# Patient Record
Sex: FEMALE | URBAN - METROPOLITAN AREA
[De-identification: names, ages, dates, MRNs, and addresses within clinical notes are randomized per-mention and may not be internally consistent; named-entity substitution may affect disease eponyms.]

---

## 2022-07-13 ENCOUNTER — HOSPITAL ENCOUNTER (OUTPATIENT)
Age: 47
Discharge: HOME | End: 2022-07-15

## 2022-07-13 PROCEDURE — 88304 TISSUE EXAM BY PATHOLOGIST: CPT

## 2022-07-13 PROCEDURE — 88305 TISSUE EXAM BY PATHOLOGIST: CPT

## 2023-05-19 LAB
APPEARANCE, URINE: CLEAR
BILIRUBIN, URINE: NEGATIVE
BLOOD, URINE: NEGATIVE
C REACTIVE PROTEIN (MG/L) IN SER/PLAS: <0.1 MG/DL
CALCIUM OXALATE CRYSTALS, URINE: ABNORMAL /HPF
COLOR, URINE: YELLOW
COMPLEMENT C3 (MG/DL) IN SER/PLAS: 110 MG/DL (ref 87–200)
COMPLEMENT C4 (MG/DL) IN SER/PLAS: 29 MG/DL (ref 10–50)
CREATININE (MG/DL) IN URINE: 116 MG/DL (ref 20–320)
GLUCOSE, URINE: NEGATIVE MG/DL
KETONES, URINE: NEGATIVE MG/DL
LEUKOCYTE ESTERASE, URINE: ABNORMAL
NITRITE, URINE: NEGATIVE
PH, URINE: 6 (ref 5–8)
PROTEIN (MG/DL) IN URINE: 10 MG/DL (ref 5–24)
PROTEIN, URINE: NEGATIVE MG/DL
PROTEIN/CREATININE (MG/MG) IN URINE: 0.09 MG/MG CREAT (ref 0–0.17)
RBC, URINE: 3 /HPF (ref 0–5)
SPECIFIC GRAVITY, URINE: 1.03 (ref 1–1.03)
SQUAMOUS EPITHELIAL CELLS, URINE: 1 /HPF
UROBILINOGEN, URINE: <2 MG/DL (ref 0–1.9)
WBC, URINE: 7 /HPF (ref 0–5)

## 2023-06-01 LAB
C REACTIVE PROTEIN (MG/L) IN SER/PLAS: <0.1 MG/DL
ERYTHROCYTE DISTRIBUTION WIDTH (RATIO) BY AUTOMATED COUNT: 11.6 % (ref 11.5–14.5)
ERYTHROCYTE MEAN CORPUSCULAR HEMOGLOBIN CONCENTRATION (G/DL) BY AUTOMATED: 33.5 G/DL (ref 32–36)
ERYTHROCYTE MEAN CORPUSCULAR VOLUME (FL) BY AUTOMATED COUNT: 99 FL (ref 80–100)
ERYTHROCYTES (10*6/UL) IN BLOOD BY AUTOMATED COUNT: 3.96 X10E12/L (ref 4–5.2)
HEMATOCRIT (%) IN BLOOD BY AUTOMATED COUNT: 39.4 % (ref 36–46)
HEMOGLOBIN (G/DL) IN BLOOD: 13.2 G/DL (ref 12–16)
LEUKOCYTES (10*3/UL) IN BLOOD BY AUTOMATED COUNT: 7.2 X10E9/L (ref 4.4–11.3)
PLATELETS (10*3/UL) IN BLOOD AUTOMATED COUNT: 311 X10E9/L (ref 150–450)
SEDIMENTATION RATE, ERYTHROCYTE: 1 MM/H (ref 0–20)

## 2023-08-07 LAB
ALANINE AMINOTRANSFERASE (SGPT) (U/L) IN SER/PLAS: 20 U/L (ref 7–45)
ALBUMIN (G/DL) IN SER/PLAS: 4.2 G/DL (ref 3.4–5)
ALKALINE PHOSPHATASE (U/L) IN SER/PLAS: 42 U/L (ref 33–110)
ANION GAP IN SER/PLAS: 11 MMOL/L (ref 10–20)
ASPARTATE AMINOTRANSFERASE (SGOT) (U/L) IN SER/PLAS: 21 U/L (ref 9–39)
BILIRUBIN TOTAL (MG/DL) IN SER/PLAS: 0.4 MG/DL (ref 0–1.2)
C REACTIVE PROTEIN (MG/L) IN SER/PLAS: <0.1 MG/DL
CALCIUM (MG/DL) IN SER/PLAS: 9.3 MG/DL (ref 8.6–10.3)
CARBON DIOXIDE, TOTAL (MMOL/L) IN SER/PLAS: 26 MMOL/L (ref 21–32)
CHLORIDE (MMOL/L) IN SER/PLAS: 103 MMOL/L (ref 98–107)
CREATININE (MG/DL) IN SER/PLAS: 1.06 MG/DL (ref 0.5–1.05)
ERYTHROCYTE DISTRIBUTION WIDTH (RATIO) BY AUTOMATED COUNT: 11.5 % (ref 11.5–14.5)
ERYTHROCYTE MEAN CORPUSCULAR HEMOGLOBIN CONCENTRATION (G/DL) BY AUTOMATED: 33.2 G/DL (ref 32–36)
ERYTHROCYTE MEAN CORPUSCULAR VOLUME (FL) BY AUTOMATED COUNT: 97 FL (ref 80–100)
ERYTHROCYTES (10*6/UL) IN BLOOD BY AUTOMATED COUNT: 4.33 X10E12/L (ref 4–5.2)
GFR FEMALE: 65 ML/MIN/1.73M2
GLUCOSE (MG/DL) IN SER/PLAS: 76 MG/DL (ref 74–99)
HEMATOCRIT (%) IN BLOOD BY AUTOMATED COUNT: 42.2 % (ref 36–46)
HEMOGLOBIN (G/DL) IN BLOOD: 14 G/DL (ref 12–16)
LEUKOCYTES (10*3/UL) IN BLOOD BY AUTOMATED COUNT: 8.5 X10E9/L (ref 4.4–11.3)
PLATELETS (10*3/UL) IN BLOOD AUTOMATED COUNT: 436 X10E9/L (ref 150–450)
POTASSIUM (MMOL/L) IN SER/PLAS: 3.8 MMOL/L (ref 3.5–5.3)
PROTEIN TOTAL: 6.9 G/DL (ref 6.4–8.2)
SEDIMENTATION RATE, ERYTHROCYTE: 9 MM/H (ref 0–20)
SODIUM (MMOL/L) IN SER/PLAS: 136 MMOL/L (ref 136–145)
THYROTROPIN (MIU/L) IN SER/PLAS BY DETECTION LIMIT <= 0.05 MIU/L: 2.78 MIU/L (ref 0.44–3.98)
UREA NITROGEN (MG/DL) IN SER/PLAS: 19 MG/DL (ref 6–23)

## 2023-08-08 LAB
CALCIDIOL (25 OH VITAMIN D3) (NG/ML) IN SER/PLAS: 56 NG/ML
CITRULLINE ANTIBODY, IGG: <1 U/ML
RHEUMATOID FACTOR (IU/ML) IN SERUM OR PLASMA: <10 IU/ML (ref 0–15)

## 2023-10-24 ENCOUNTER — TELEPHONE (OUTPATIENT)
Dept: RHEUMATOLOGY | Facility: CLINIC | Age: 48
End: 2023-10-24

## 2023-10-24 NOTE — TELEPHONE ENCOUNTER
Patient wants to discuss bloodwork results. She indicated that she is concerned about her iron levels. Please call her.

## 2023-11-10 PROBLEM — M25.50 ARTHRALGIA: Status: ACTIVE | Noted: 2023-11-10

## 2023-11-10 PROBLEM — N92.6 IRREGULAR BLEEDING: Status: ACTIVE | Noted: 2023-11-10

## 2023-11-10 PROBLEM — K29.60 EROSIVE GASTRITIS: Status: ACTIVE | Noted: 2023-11-10

## 2023-11-10 PROBLEM — R19.09 OTHER INTRA-ABDOMINAL AND PELVIC SWELLING, MASS AND LUMP: Status: ACTIVE | Noted: 2023-11-10

## 2023-11-10 PROBLEM — Z04.9 CONDITION NOT FOUND: Status: ACTIVE | Noted: 2023-11-10

## 2023-11-10 PROBLEM — N83.209 BENIGN OVARIAN CYST: Status: ACTIVE | Noted: 2023-11-10

## 2023-11-10 PROBLEM — R82.998 URINE LEUKOCYTES: Status: ACTIVE | Noted: 2023-11-10

## 2023-11-10 PROBLEM — K31.84 GASTROPARESIS: Status: ACTIVE | Noted: 2023-11-10

## 2023-11-10 PROBLEM — N95.1 PERIMENOPAUSAL SYMPTOMS: Status: ACTIVE | Noted: 2023-11-10

## 2023-11-10 PROBLEM — R76.8 POSITIVE ANA (ANTINUCLEAR ANTIBODY): Status: ACTIVE | Noted: 2023-11-10

## 2023-11-10 PROBLEM — G58.8 INTERCOSTAL NEURALGIA: Status: ACTIVE | Noted: 2023-11-10

## 2023-11-10 PROBLEM — N70.11 CHRONIC SALPINGITIS: Status: ACTIVE | Noted: 2023-11-10

## 2023-11-10 PROBLEM — R30.0 DYSURIA: Status: ACTIVE | Noted: 2023-11-10

## 2023-11-10 PROBLEM — R32 URINARY INCONTINENCE: Status: ACTIVE | Noted: 2023-11-10

## 2023-11-10 PROBLEM — K82.8 BILIARY DYSKINESIA: Status: ACTIVE | Noted: 2023-11-10

## 2023-11-10 PROBLEM — N95.1 VAGINAL DRYNESS, MENOPAUSAL: Status: ACTIVE | Noted: 2023-11-10

## 2023-11-10 PROBLEM — E04.1 SOLITARY THYROID NODULE: Status: ACTIVE | Noted: 2023-11-10

## 2023-11-10 PROBLEM — I73.00 RAYNAUD PHENOMENON: Status: ACTIVE | Noted: 2023-11-10

## 2023-11-10 PROBLEM — Z86.69 HISTORY OF MIGRAINE HEADACHES: Status: ACTIVE | Noted: 2023-11-10

## 2023-11-10 PROBLEM — M54.50 LUMBAGO: Status: ACTIVE | Noted: 2023-11-10

## 2023-11-10 PROBLEM — T69.1XXA CHILBLAIN: Status: ACTIVE | Noted: 2023-11-10

## 2023-11-10 PROBLEM — E03.9 HYPOTHYROIDISM: Status: ACTIVE | Noted: 2023-11-10

## 2023-11-10 PROBLEM — N32.81 OAB (OVERACTIVE BLADDER): Status: ACTIVE | Noted: 2023-11-10

## 2023-11-10 PROBLEM — G57.00 PIRIFORMIS SYNDROME: Status: ACTIVE | Noted: 2023-11-10

## 2023-11-10 RX ORDER — RIZATRIPTAN BENZOATE 10 MG/1
TABLET ORAL
COMMUNITY
Start: 2023-10-03

## 2023-11-10 RX ORDER — ZIPRASIDONE HYDROCHLORIDE 20 MG/1
20 CAPSULE ORAL
COMMUNITY
Start: 2023-10-07 | End: 2023-11-11 | Stop reason: WASHOUT

## 2023-11-10 RX ORDER — TOLTERODINE 4 MG/1
4 CAPSULE, EXTENDED RELEASE ORAL DAILY
COMMUNITY
Start: 2023-07-20

## 2023-11-10 RX ORDER — ONDANSETRON 4 MG/1
4 TABLET, FILM COATED ORAL EVERY 8 HOURS PRN
COMMUNITY
Start: 2023-10-03

## 2023-11-10 RX ORDER — ZIPRASIDONE HYDROCHLORIDE 40 MG/1
40 CAPSULE ORAL
COMMUNITY
End: 2023-11-11 | Stop reason: WASHOUT

## 2023-11-10 RX ORDER — POLYETHYLENE GLYCOL 3350 17 G/17G
POWDER, FOR SOLUTION ORAL
COMMUNITY

## 2023-11-10 RX ORDER — LIOTHYRONINE SODIUM 5 UG/1
5 TABLET ORAL 2 TIMES DAILY
COMMUNITY
End: 2023-11-11 | Stop reason: WASHOUT

## 2023-11-10 RX ORDER — SPIRONOLACTONE 25 MG/1
25 TABLET ORAL DAILY
COMMUNITY
Start: 2023-09-30

## 2023-11-10 RX ORDER — BUPROPION HYDROCHLORIDE 300 MG/1
300 TABLET ORAL EVERY MORNING
COMMUNITY
Start: 2023-10-07

## 2023-11-10 RX ORDER — FREMANEZUMAB-VFRM 225 MG/1.5ML
INJECTION SUBCUTANEOUS
COMMUNITY
Start: 2023-01-30 | End: 2023-11-11 | Stop reason: WASHOUT

## 2023-11-10 RX ORDER — ERENUMAB-AOOE 140 MG/ML
INJECTION, SOLUTION SUBCUTANEOUS
COMMUNITY
Start: 2023-10-02 | End: 2023-11-11 | Stop reason: WASHOUT

## 2023-11-10 RX ORDER — SCOLOPAMINE TRANSDERMAL SYSTEM 1 MG/1
1 PATCH, EXTENDED RELEASE TRANSDERMAL
COMMUNITY
Start: 2023-06-11

## 2023-11-10 RX ORDER — ASPIRIN 325 MG
50000 TABLET, DELAYED RELEASE (ENTERIC COATED) ORAL
COMMUNITY
Start: 2023-09-19

## 2023-11-10 RX ORDER — HYDROCODONE BITARTRATE AND ACETAMINOPHEN 5; 325 MG/1; MG/1
TABLET ORAL
COMMUNITY
Start: 2023-09-18 | End: 2023-11-11 | Stop reason: WASHOUT

## 2023-11-10 RX ORDER — VILAZODONE HYDROCHLORIDE 40 MG/1
40 TABLET ORAL EVERY MORNING
COMMUNITY
Start: 2023-10-03 | End: 2023-11-11 | Stop reason: WASHOUT

## 2023-11-10 RX ORDER — VILAZODONE HYDROCHLORIDE 20 MG/1
20 TABLET ORAL EVERY MORNING
COMMUNITY
Start: 2023-08-07 | End: 2023-11-11 | Stop reason: WASHOUT

## 2023-11-10 RX ORDER — PLECANATIDE 3 MG/1
TABLET ORAL
COMMUNITY
Start: 2023-08-14

## 2023-11-10 RX ORDER — VORTIOXETINE 10 MG/1
10 TABLET, FILM COATED ORAL EVERY MORNING
COMMUNITY
Start: 2023-05-25 | End: 2023-11-11 | Stop reason: WASHOUT

## 2023-11-10 RX ORDER — DICLOFENAC EPOLAMINE 0.01 G/1
1 SYSTEM TOPICAL 2 TIMES DAILY PRN
COMMUNITY
Start: 2019-10-31 | End: 2023-11-11 | Stop reason: WASHOUT

## 2023-11-10 RX ORDER — DESOXIMETASONE 2.5 MG/G
OINTMENT TOPICAL
COMMUNITY

## 2023-11-10 RX ORDER — MIRABEGRON 50 MG/1
1 TABLET, FILM COATED, EXTENDED RELEASE ORAL DAILY
COMMUNITY
Start: 2021-06-12 | End: 2023-11-11 | Stop reason: WASHOUT

## 2023-11-10 RX ORDER — LEVOTHYROXINE SODIUM 88 UG/1
TABLET ORAL
COMMUNITY

## 2023-11-10 RX ORDER — TOPIRAMATE 100 MG/1
TABLET, FILM COATED ORAL
COMMUNITY
Start: 2018-02-22

## 2023-11-10 RX ORDER — DULOXETIN HYDROCHLORIDE 20 MG/1
CAPSULE, DELAYED RELEASE ORAL
COMMUNITY
End: 2023-11-11 | Stop reason: WASHOUT

## 2023-11-11 ENCOUNTER — OFFICE VISIT (OUTPATIENT)
Dept: OBSTETRICS AND GYNECOLOGY | Facility: CLINIC | Age: 48
End: 2023-11-11
Payer: COMMERCIAL

## 2023-11-11 VITALS
HEIGHT: 64 IN | WEIGHT: 124 LBS | BODY MASS INDEX: 21.17 KG/M2 | TEMPERATURE: 97 F | DIASTOLIC BLOOD PRESSURE: 63 MMHG | SYSTOLIC BLOOD PRESSURE: 112 MMHG

## 2023-11-11 DIAGNOSIS — N39.3 STRESS INCONTINENCE OF URINE: Primary | ICD-10-CM

## 2023-11-11 DIAGNOSIS — N95.2 POSTMENOPAUSAL ATROPHIC VAGINITIS: ICD-10-CM

## 2023-11-11 PROCEDURE — 99214 OFFICE O/P EST MOD 30 MIN: CPT | Performed by: MIDWIFE

## 2023-11-11 PROCEDURE — 1036F TOBACCO NON-USER: CPT | Performed by: MIDWIFE

## 2023-11-11 PROCEDURE — 81003 URINALYSIS AUTO W/O SCOPE: CPT | Performed by: MIDWIFE

## 2023-11-11 RX ORDER — ESTRADIOL 0.1 MG/G
CREAM VAGINAL
Qty: 34 G | Refills: 3 | Status: SHIPPED | OUTPATIENT
Start: 2023-11-11

## 2023-11-11 ASSESSMENT — PAIN SCALES - GENERAL: PAINLEVEL: 0-NO PAIN

## 2023-11-11 NOTE — PROGRESS NOTES
Subjective   Patient ID: Sienna Acuña is a 48 y.o.  female who presents for vulvovaginal irritation/bleeding.  Pt says that it is not helped with use of steroid creams.  She does exercise and run regularly so this area is often sweaty and exposed to urine d/t pt leaking and pt does shave hair on vulva    HPI  PMHx: Hyst for endometriosis; bilateral breast augmentation;partial sigmoidectomy with subsequent chronic constipation; bladder sling, OAB, chronic vulvar irritation managed with steroid cream  ROS: no pelvic pain, no dysuria, NAD  SocHx: , SA, nonsmoker  Review of Systems   Genitourinary:  Positive for vaginal pain.        Vulvar irritation with blood tinged discharge  Urinary leakage   All other systems reviewed and are negative.      Objective   Physical Exam  Genitourinary:     Uterus: Absent.       Comments: Vulvovaginal atrophic changes and skin friability in areas where pt shaves      Physical Exam  Vitals and nursing note reviewed.   Constitutional:       Appearance: Normal appearance.   HENT:     Head/Face: Normal  Eyes:      Pupils: Pupils are equal, round, and reactive to light.   Pulmonary:      Effort: Pulmonary effort is normal.     Abdominal:      Palpations: Abdomen is soft. There is no mass.      Tenderness: There is no abdominal tenderness.   Musculoskeletal:         General: Normal range of motion.   Lymphadenopathy:      Cervical: No cervical adenopathy.         General: Skin is warm and dry.   Neurological:      Mental Status: She is alert and oriented    Psychiatric:         Mood and Affect: Mood normal.     Assessment/Plan   Diagnoses and all orders for this visit:  Stress incontinence of urine  -     POCT UA Automated manually resulted  Postmenopausal atrophic vaginitis  -     estradiol (Estrace) 0.01 % (0.1 mg/gram) vaginal cream; Apply pea-sized amount intravaginally and to affected areas of vulva at bedtime for 2 weeks, then taper to as needed use.    Reassurance given re  exam findings  Vulvovaginal skin care discussed at length  Urine dip today in office is NEGATIVE-- NORMAL.   Pt encouraged to limit intake of caffeine to see if this helps with urinary leakage  If sx not improved within 90 days, then RTO is advised  RTO AE/prn

## 2024-02-01 ENCOUNTER — TELEPHONE (OUTPATIENT)
Dept: RHEUMATOLOGY | Facility: CLINIC | Age: 49
End: 2024-02-01
Payer: COMMERCIAL

## 2024-02-01 DIAGNOSIS — R76.8 POSITIVE ANA (ANTINUCLEAR ANTIBODY): Primary | ICD-10-CM

## 2024-02-01 NOTE — TELEPHONE ENCOUNTER
Patient wants to know if she needs to get blood work before her next appointment. Please advise. Thank you.

## 2024-02-09 ENCOUNTER — LAB (OUTPATIENT)
Dept: LAB | Facility: LAB | Age: 49
End: 2024-02-09
Payer: COMMERCIAL

## 2024-02-09 ENCOUNTER — OFFICE VISIT (OUTPATIENT)
Dept: RHEUMATOLOGY | Facility: CLINIC | Age: 49
End: 2024-02-09
Payer: COMMERCIAL

## 2024-02-09 VITALS
TEMPERATURE: 97.5 F | SYSTOLIC BLOOD PRESSURE: 103 MMHG | DIASTOLIC BLOOD PRESSURE: 42 MMHG | WEIGHT: 121 LBS | HEART RATE: 73 BPM | BODY MASS INDEX: 20.66 KG/M2 | HEIGHT: 64 IN

## 2024-02-09 DIAGNOSIS — I73.00 RAYNAUD'S DISEASE WITHOUT GANGRENE: ICD-10-CM

## 2024-02-09 DIAGNOSIS — R76.8 POSITIVE ANA (ANTINUCLEAR ANTIBODY): ICD-10-CM

## 2024-02-09 DIAGNOSIS — R76.8 POSITIVE ANA (ANTINUCLEAR ANTIBODY): Primary | ICD-10-CM

## 2024-02-09 DIAGNOSIS — M25.50 ARTHRALGIA, UNSPECIFIED JOINT: ICD-10-CM

## 2024-02-09 LAB
APPEARANCE UR: CLEAR
BASOPHILS # BLD AUTO: 0.04 X10*3/UL (ref 0–0.1)
BASOPHILS NFR BLD AUTO: 0.4 %
BILIRUB UR STRIP.AUTO-MCNC: NEGATIVE MG/DL
C3 SERPL-MCNC: 110 MG/DL (ref 87–200)
C4 SERPL-MCNC: 26 MG/DL (ref 10–50)
COLOR UR: ABNORMAL
CREAT UR-MCNC: 60.3 MG/DL (ref 20–320)
CRP SERPL-MCNC: <0.1 MG/DL
DSDNA AB SER-ACNC: 1 IU/ML
EOSINOPHIL # BLD AUTO: 0.08 X10*3/UL (ref 0–0.7)
EOSINOPHIL NFR BLD AUTO: 0.8 %
ERYTHROCYTE [DISTWIDTH] IN BLOOD BY AUTOMATED COUNT: 12.2 % (ref 11.5–14.5)
ERYTHROCYTE [SEDIMENTATION RATE] IN BLOOD BY WESTERGREN METHOD: <1 MM/H (ref 0–20)
GLUCOSE UR STRIP.AUTO-MCNC: NORMAL MG/DL
HCT VFR BLD AUTO: 42.1 % (ref 36–46)
HGB BLD-MCNC: 14.2 G/DL (ref 12–16)
IMM GRANULOCYTES # BLD AUTO: 0.04 X10*3/UL (ref 0–0.7)
IMM GRANULOCYTES NFR BLD AUTO: 0.4 % (ref 0–0.9)
KETONES UR STRIP.AUTO-MCNC: NEGATIVE MG/DL
LEUKOCYTE ESTERASE UR QL STRIP.AUTO: ABNORMAL
LYMPHOCYTES # BLD AUTO: 1.31 X10*3/UL (ref 1.2–4.8)
LYMPHOCYTES NFR BLD AUTO: 13.9 %
MCH RBC QN AUTO: 32.4 PG (ref 26–34)
MCHC RBC AUTO-ENTMCNC: 33.7 G/DL (ref 32–36)
MCV RBC AUTO: 96 FL (ref 80–100)
MONOCYTES # BLD AUTO: 0.58 X10*3/UL (ref 0.1–1)
MONOCYTES NFR BLD AUTO: 6.2 %
NEUTROPHILS # BLD AUTO: 7.37 X10*3/UL (ref 1.2–7.7)
NEUTROPHILS NFR BLD AUTO: 78.3 %
NITRITE UR QL STRIP.AUTO: NEGATIVE
NRBC BLD-RTO: 0 /100 WBCS (ref 0–0)
PH UR STRIP.AUTO: 5.5 [PH]
PLATELET # BLD AUTO: 341 X10*3/UL (ref 150–450)
PROT UR STRIP.AUTO-MCNC: NEGATIVE MG/DL
PROT UR-ACNC: 4 MG/DL (ref 5–24)
PROT/CREAT UR: 0.07 MG/MG CREAT (ref 0–0.17)
RBC # BLD AUTO: 4.38 X10*6/UL (ref 4–5.2)
RBC # UR STRIP.AUTO: NEGATIVE /UL
RBC #/AREA URNS AUTO: NORMAL /HPF
SP GR UR STRIP.AUTO: 1.01
SQUAMOUS #/AREA URNS AUTO: NORMAL /HPF
UROBILINOGEN UR STRIP.AUTO-MCNC: NORMAL MG/DL
WBC # BLD AUTO: 9.4 X10*3/UL (ref 4.4–11.3)
WBC #/AREA URNS AUTO: NORMAL /HPF

## 2024-02-09 PROCEDURE — 86160 COMPLEMENT ANTIGEN: CPT

## 2024-02-09 PROCEDURE — 99214 OFFICE O/P EST MOD 30 MIN: CPT | Performed by: INTERNAL MEDICINE

## 2024-02-09 PROCEDURE — 84156 ASSAY OF PROTEIN URINE: CPT

## 2024-02-09 PROCEDURE — 82570 ASSAY OF URINE CREATININE: CPT

## 2024-02-09 PROCEDURE — 81001 URINALYSIS AUTO W/SCOPE: CPT

## 2024-02-09 PROCEDURE — 86140 C-REACTIVE PROTEIN: CPT

## 2024-02-09 PROCEDURE — 1036F TOBACCO NON-USER: CPT | Performed by: INTERNAL MEDICINE

## 2024-02-09 PROCEDURE — 85025 COMPLETE CBC W/AUTO DIFF WBC: CPT

## 2024-02-09 PROCEDURE — 36415 COLL VENOUS BLD VENIPUNCTURE: CPT

## 2024-02-09 PROCEDURE — 86225 DNA ANTIBODY NATIVE: CPT

## 2024-02-09 PROCEDURE — 85652 RBC SED RATE AUTOMATED: CPT

## 2024-02-09 ASSESSMENT — PAIN SCALES - GENERAL: PAINLEVEL: 0-NO PAIN

## 2024-02-09 NOTE — PROGRESS NOTES
Subjective   Patient ID: Sienna Acuña is a 48 y.o. female who presents for Follow-up (Follow up).    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  She reports Raynaud like symptoms started a couple years ago.  She is having rash in her face, and low grade fever, joint pain episodes lasts a few days.  She endorses fatigue and low grade fever, joint pain.   She reports dry eyes, dry mouth. Oral ulcer, hair loss  Dermatology performed skin biopsy in her skin lesions and Pathology showed chilblain lupus. Pernio, chilblain vasculitis.  Her biopsy revealed positive JENNIFER 1/160 pos homogenous, neg HANSA, dsDNA, RF and normal ESR.  Family h/o arthritis in her uncle.     08/23:  The patient noted chillblain like skin lesions in her distal fingers in May, lasted a few days.  She does not have any joint pain, skin rashes, oral ulcer or any other active problem.     02/24:  She reports Raynaud like symptoms, and also flu like episodes characterized by low grade fever, fatigue, muscle pain last a few days. Also, she has noted small tender nodules in her fingers.  She denies any joint pain, swelling, skin rashes or oral ulcer.    ROS  Joint pain in hands: negative  Joint swelling: negative  Morning stiffness and duration: negative   strength: normal  Oral ulcer: negative  Genital ulcer: negative  Raynaud phenomenon: negative  Chest pain/dyspnea: negative  Low back pain: negative  Visual problem: negative  Dry eyes/dry mouth: negative  Skin rash/scaling/psoriasis: negative       Objective     PEXAM  VS reviewed, WNL  General: Alert, no distress   HEENT: Normocephalic/atraumatic, No alopecia. PERRLA. Sclera white, conjunctiva pink, no malar rash. no oral or nasal ulcer. Oral cavity pink and moist, no erythema or exudate, dentition good.   Neck: supple  Respiratory: CTA B, no adventitious breath sounds  Cardiac: RRR, no murmurs, carotid, or bruits  Abdominal: symmetrical, soft, non-tender, non-distended, normoactive  BSx4 quadrants, no CVA tenderness or suprapubic tenderness  MSK: Joints of upper and lower extremities were assessed for synovitis and ROM.    Today she has no evidence of synovitis in the joints of her hands or wrists, tender joint count 0, swollen joint count 0   Extremities: no clubbing, no cyanosis, no edema  Skin: Skin warm and moist.   Neuro: non-focal, Strength 5/5 throughout. Normal gait. No cerebellar pathologic exam     Assessment/Plan   47 yo female with positive JENNIFER and chilblain lupus.  She reports Raynaud like symptoms.  She complains about fever, joint pain and rash episodes.  Her episodes lasts a few days.  She has noted small tender nodules in her fingers  PE showed only one small tender nodule in her right second finger, but no synovitis.  Her recent lab tests showed normal acute phases, CBC.    -will see her ESR, CRP, urine, C3, C4  -will see her acute phases during an episode  -will see her in the clinic in 8-9 months

## 2024-02-12 NOTE — TELEPHONE ENCOUNTER
Call placed to pt to make aware of this nurse not being able to see any messages sent via [employer]. Unable to speak directly with pt due to vm prompt. This nurse left vm stating what was referenced above and stated that if using other institutions, they could be sending a message. Pt made aware that test message was sent by this nurse and at her convenience, she should try to view and respond to it if possible. Nothing further to address.

## 2024-02-12 NOTE — TELEPHONE ENCOUNTER
----- Message from Portia Corado sent at 2/12/2024  8:52 AM EST -----  Regarding: patient return call rhoda solitario  Good morning, patient calling in. States she is getting messages in my chart and unable to read them. Would you mind giving a call back. Sienna cordero  [phone]

## 2024-02-14 ENCOUNTER — OFFICE VISIT (OUTPATIENT)
Dept: OBSTETRICS AND GYNECOLOGY | Facility: CLINIC | Age: 49
End: 2024-02-14
Payer: COMMERCIAL

## 2024-02-14 VITALS — DIASTOLIC BLOOD PRESSURE: 68 MMHG | BODY MASS INDEX: 20.94 KG/M2 | WEIGHT: 122 LBS | SYSTOLIC BLOOD PRESSURE: 102 MMHG

## 2024-02-14 DIAGNOSIS — N89.8 VAGINAL DISCHARGE: Primary | ICD-10-CM

## 2024-02-14 DIAGNOSIS — R82.998 URINE LEUKOCYTES: ICD-10-CM

## 2024-02-14 DIAGNOSIS — R35.0 URINARY FREQUENCY: ICD-10-CM

## 2024-02-14 LAB
POC BLOOD, URINE: NEGATIVE
POC GLUCOSE, URINE: NEGATIVE MG/DL
POC LEUKOCYTES, URINE: ABNORMAL
POC NITRITE,URINE: NEGATIVE
POC PROTEIN, URINE: ABNORMAL MG/DL

## 2024-02-14 PROCEDURE — 81002 URINALYSIS NONAUTO W/O SCOPE: CPT | Performed by: MIDWIFE

## 2024-02-14 PROCEDURE — 87086 URINE CULTURE/COLONY COUNT: CPT

## 2024-02-14 PROCEDURE — 1036F TOBACCO NON-USER: CPT | Performed by: MIDWIFE

## 2024-02-14 PROCEDURE — 99213 OFFICE O/P EST LOW 20 MIN: CPT | Performed by: MIDWIFE

## 2024-02-14 RX ORDER — MIRABEGRON 50 MG/1
50 TABLET, FILM COATED, EXTENDED RELEASE ORAL DAILY
COMMUNITY

## 2024-02-14 ASSESSMENT — ENCOUNTER SYMPTOMS: FREQUENCY: 1

## 2024-02-14 NOTE — PROGRESS NOTES
Subjective   Patient ID: Sienna Acuña is a 48 y.o.  female who presents for Urinary Frequency and slight vaginal irritation/discharge and odor for the past week. Pt does not identify any precipitating factors. Pt does use topical estradiol for atrophy    Urinary Frequency   Associated symptoms include frequency.     PMHx: Hyst for endometriosis; bilateral breast augmentation;partial sigmoidectomy with subsequent chronic constipation; bladder sling, OAB, chronic vulvar irritation managed with steroid cream   SocHx: , SA, nonsmoker; pt drinks 2 coffee/day    ROS: no pelvic pain, no flank pain, NAD  Review of Systems   Genitourinary:  Positive for frequency, vaginal discharge and vaginal pain.       Objective   Physical Exam  Abdominal:      Tenderness: There is no right CVA tenderness or left CVA tenderness.   Genitourinary:     General: Normal vulva.      Vagina: Vaginal discharge present.      Uterus: Absent.       Comments: Scant whitish discharge      Physical Exam  Vitals and nursing note reviewed.   Constitutional:       Appearance: Normal appearance.   HENT:     Head/Face: Normal  Eyes:      Pupils: Pupils are equal, round, and reactive to light.   Pulmonary:      Effort: Pulmonary effort is normal.     Abdominal:      Palpations: Abdomen is soft. There is no mass.      Tenderness: There is no abdominal tenderness.   Musculoskeletal:         General: Normal range of motion.   Lymphadenopathy:      Cervical: No cervical adenopathy.         General: Skin is warm and dry.   Neurological:      Mental Status: She is alert and oriented    Psychiatric:         Mood and Affect: Mood normal.     Assessment/Plan   Diagnoses and all orders for this visit:  Vaginal discharge  -     Genital Culture  Urinary frequency  -     POCT UA (nonautomated) manually resulted  -     Urine Culture  Urine leukocytes  -     POCT UA (nonautomated) manually resulted  -     Urine Culture    Reassurance given re exam findings    Lifestyle measures discussed to help mitigate sx including increased water intake  Await test results for tx     STEFFI Mcdaniels-KARMEN, ND 02/14/24 2:42 PM

## 2024-02-15 LAB — BACTERIA UR CULT: NO GROWTH

## 2024-02-21 DIAGNOSIS — N76.0 BACTERIAL VAGINOSIS: ICD-10-CM

## 2024-02-21 DIAGNOSIS — B96.89 BACTERIAL VAGINOSIS: ICD-10-CM

## 2024-02-21 DIAGNOSIS — B37.31 CANDIDIASIS OF VAGINA: Primary | ICD-10-CM

## 2024-02-21 RX ORDER — FLUCONAZOLE 150 MG/1
TABLET ORAL
Qty: 2 TABLET | Refills: 0 | Status: SHIPPED | OUTPATIENT
Start: 2024-02-21 | End: 2024-04-18 | Stop reason: SDUPTHER

## 2024-02-21 RX ORDER — METRONIDAZOLE 500 MG/1
500 TABLET ORAL 2 TIMES DAILY
Qty: 14 TABLET | Refills: 0 | Status: SHIPPED | OUTPATIENT
Start: 2024-02-21 | End: 2024-02-28

## 2024-03-19 ENCOUNTER — PATIENT MESSAGE (OUTPATIENT)
Dept: RHEUMATOLOGY | Facility: CLINIC | Age: 49
End: 2024-03-19
Payer: COMMERCIAL

## 2024-03-19 ENCOUNTER — LAB (OUTPATIENT)
Dept: LAB | Facility: LAB | Age: 49
End: 2024-03-19
Payer: COMMERCIAL

## 2024-03-19 DIAGNOSIS — I73.00 RAYNAUD'S DISEASE WITHOUT GANGRENE: ICD-10-CM

## 2024-03-19 DIAGNOSIS — R76.8 POSITIVE ANA (ANTINUCLEAR ANTIBODY): Primary | ICD-10-CM

## 2024-03-19 DIAGNOSIS — R76.8 POSITIVE ANA (ANTINUCLEAR ANTIBODY): ICD-10-CM

## 2024-03-19 LAB
BASOPHILS # BLD AUTO: 0.05 X10*3/UL (ref 0–0.1)
BASOPHILS NFR BLD AUTO: 0.7 %
EOSINOPHIL # BLD AUTO: 0.08 X10*3/UL (ref 0–0.7)
EOSINOPHIL NFR BLD AUTO: 1.1 %
ERYTHROCYTE [DISTWIDTH] IN BLOOD BY AUTOMATED COUNT: 11.9 % (ref 11.5–14.5)
ERYTHROCYTE [SEDIMENTATION RATE] IN BLOOD BY WESTERGREN METHOD: <1 MM/H (ref 0–20)
HCT VFR BLD AUTO: 39.6 % (ref 36–46)
HGB BLD-MCNC: 13.1 G/DL (ref 12–16)
IMM GRANULOCYTES # BLD AUTO: 0.01 X10*3/UL (ref 0–0.7)
IMM GRANULOCYTES NFR BLD AUTO: 0.1 % (ref 0–0.9)
LYMPHOCYTES # BLD AUTO: 2.59 X10*3/UL (ref 1.2–4.8)
LYMPHOCYTES NFR BLD AUTO: 34.9 %
MCH RBC QN AUTO: 32.5 PG (ref 26–34)
MCHC RBC AUTO-ENTMCNC: 33.1 G/DL (ref 32–36)
MCV RBC AUTO: 98 FL (ref 80–100)
MONOCYTES # BLD AUTO: 0.5 X10*3/UL (ref 0.1–1)
MONOCYTES NFR BLD AUTO: 6.7 %
NEUTROPHILS # BLD AUTO: 4.19 X10*3/UL (ref 1.2–7.7)
NEUTROPHILS NFR BLD AUTO: 56.5 %
NRBC BLD-RTO: 0 /100 WBCS (ref 0–0)
PLATELET # BLD AUTO: 317 X10*3/UL (ref 150–450)
RBC # BLD AUTO: 4.03 X10*6/UL (ref 4–5.2)
WBC # BLD AUTO: 7.4 X10*3/UL (ref 4.4–11.3)

## 2024-03-19 PROCEDURE — 86140 C-REACTIVE PROTEIN: CPT

## 2024-03-19 PROCEDURE — 85025 COMPLETE CBC W/AUTO DIFF WBC: CPT

## 2024-03-19 PROCEDURE — 36415 COLL VENOUS BLD VENIPUNCTURE: CPT

## 2024-03-19 PROCEDURE — 85652 RBC SED RATE AUTOMATED: CPT

## 2024-03-20 LAB — CRP SERPL-MCNC: <0.1 MG/DL

## 2024-04-18 DIAGNOSIS — N89.8 DISCHARGE FROM THE VAGINA: ICD-10-CM

## 2024-04-18 RX ORDER — METRONIDAZOLE 500 MG/1
500 TABLET ORAL 2 TIMES DAILY
Qty: 14 TABLET | Refills: 0 | Status: SHIPPED | OUTPATIENT
Start: 2024-04-18 | End: 2024-04-25

## 2024-04-18 RX ORDER — FLUCONAZOLE 150 MG/1
TABLET ORAL
Qty: 2 TABLET | Refills: 0 | Status: SHIPPED | OUTPATIENT
Start: 2024-04-18

## 2024-06-24 ENCOUNTER — TELEPHONE (OUTPATIENT)
Dept: OBSTETRICS AND GYNECOLOGY | Facility: CLINIC | Age: 49
End: 2024-06-24
Payer: COMMERCIAL

## 2024-06-24 NOTE — TELEPHONE ENCOUNTER
T/c to pt.  We discussed lifestyle measures to help manage vaginal odor that pt says has returned.  She is not having any pain/discomfort only odor. If sx not improved with OTC/lifestyle measures we discussed, then RTO is advised for this to be evaluated.  Pt states understanding.

## 2024-07-01 ENCOUNTER — APPOINTMENT (OUTPATIENT)
Dept: OBSTETRICS AND GYNECOLOGY | Facility: CLINIC | Age: 49
End: 2024-07-01
Payer: COMMERCIAL

## 2024-07-01 VITALS
SYSTOLIC BLOOD PRESSURE: 102 MMHG | DIASTOLIC BLOOD PRESSURE: 50 MMHG | BODY MASS INDEX: 21.17 KG/M2 | WEIGHT: 124 LBS | HEIGHT: 64 IN

## 2024-07-01 DIAGNOSIS — N95.2 POSTMENOPAUSAL ATROPHIC VAGINITIS: ICD-10-CM

## 2024-07-01 DIAGNOSIS — R30.0 DYSURIA: Primary | ICD-10-CM

## 2024-07-01 DIAGNOSIS — N89.8 VAGINAL IRRITATION: ICD-10-CM

## 2024-07-01 PROCEDURE — 99213 OFFICE O/P EST LOW 20 MIN: CPT | Performed by: MIDWIFE

## 2024-07-01 PROCEDURE — 87086 URINE CULTURE/COLONY COUNT: CPT

## 2024-07-01 PROCEDURE — 1036F TOBACCO NON-USER: CPT | Performed by: MIDWIFE

## 2024-07-01 NOTE — PROGRESS NOTES
"Subjective   Patient ID: Sienna Acuña is a 49 y.o. female who presents for vulvar concerns. She is currently using estradiol cream once a week and does  notice she feels better after using this. She c/o that since tx for BV and VVC 2/2024 she only has relief of sx for \"one week\" and then sx of vaginal irritation/discharge/odor returned off/on. She has also been having mild dysuria for the past 7 days which she says may just be from the urine touching vulvar skin.    HPI  PMHx: Hyst for endometriosis; bilateral breast augmentation;partial sigmoidectomy with subsequent chronic constipation; bladder sling, OAB, chronic vulvar irritation managed with steroid cream   SocHx: , SA, nonsmoker; pt drinks 2 coffee/day   PMHx: no pelvic pain, no flank pain, NAD  Review of Systems    Objective   Physical Exam  Constitutional:       Appearance: Normal appearance. She is normal weight.   HENT:      Head: Normocephalic.   Pulmonary:      Effort: Pulmonary effort is normal.   Abdominal:      Tenderness: There is no right CVA tenderness or left CVA tenderness.   Genitourinary:     General: Normal vulva.      Urethra: No prolapse, urethral pain, urethral swelling or urethral lesion.      Vagina: Normal. No vaginal discharge.      Uterus: Absent.       Comments: Vulvovaginal atrophic changes  Cx absent  Scant clear d/c  Neurological:      Mental Status: She is alert.   Psychiatric:         Behavior: Behavior normal.         Thought Content: Thought content normal.         Assessment/Plan   Diagnoses and all orders for this visit:  Dysuria  -     Genital Culture  -     Urine Culture  Vaginal irritation  -     Genital Culture  Postmenopausal atrophic vaginitis    Reassurance given re exam findings.  We discussed increasing use of estradiol cream to TWICE a week to see if this is helpful.   Further tx to be based on UA C&S and [facility] results.  Vulvar skin care discussed as well  RTO AE/PRN         Madelin Sr, STEFFI-LAURENM, ND " 07/01/24 10:44 AM

## 2024-07-03 LAB — BACTERIA UR CULT: NO GROWTH

## 2024-07-15 ENCOUNTER — APPOINTMENT (OUTPATIENT)
Dept: OBSTETRICS AND GYNECOLOGY | Facility: CLINIC | Age: 49
End: 2024-07-15
Payer: COMMERCIAL

## 2024-07-15 VITALS
SYSTOLIC BLOOD PRESSURE: 112 MMHG | HEIGHT: 64 IN | DIASTOLIC BLOOD PRESSURE: 66 MMHG | WEIGHT: 122 LBS | BODY MASS INDEX: 20.83 KG/M2

## 2024-07-15 DIAGNOSIS — Z01.419 WELL WOMAN EXAM: Primary | ICD-10-CM

## 2024-07-15 DIAGNOSIS — N95.2 POSTMENOPAUSAL ATROPHIC VAGINITIS: ICD-10-CM

## 2024-07-15 DIAGNOSIS — N32.81 OAB (OVERACTIVE BLADDER): ICD-10-CM

## 2024-07-15 PROCEDURE — 1036F TOBACCO NON-USER: CPT | Performed by: MIDWIFE

## 2024-07-15 PROCEDURE — 99396 PREV VISIT EST AGE 40-64: CPT | Performed by: MIDWIFE

## 2024-07-15 RX ORDER — MIRABEGRON 50 MG/1
50 TABLET, FILM COATED, EXTENDED RELEASE ORAL DAILY
Qty: 90 TABLET | Refills: 0 | OUTPATIENT
Start: 2024-07-15

## 2024-07-15 RX ORDER — ESTRADIOL 0.1 MG/G
CREAM VAGINAL
Qty: 42.5 G | Refills: 3 | Status: SHIPPED | OUTPATIENT
Start: 2024-07-15

## 2024-07-15 RX ORDER — MIRABEGRON 50 MG/1
50 TABLET, EXTENDED RELEASE ORAL DAILY
Qty: 90 TABLET | Refills: 3 | Status: SHIPPED | OUTPATIENT
Start: 2024-07-15 | End: 2025-07-15

## 2024-07-15 NOTE — PROGRESS NOTES
Subjective   Patient ID: Sienna Acuña is a 49 y.o. female  who presents for Annual Exam. She is using estradiol cream to manage vulvar irritation and also needs refill of myrbetriq that is working well for her to manage OAB.   HPI  PMHx: Hyst for endometriosis; bilateral breast augmentation;partial sigmoidectomy with subsequent chronic constipation; bladder sling, OAB, chronic vulvar irritation managed with steroid cream that pt uses only when needed. Mammogram  Neg; 1 vaginal birth and 1 c/s birth  SocHx:  10 yrs to partner of 20 yrs, SA, nonsmoker; pt drinks 2 coffee/day   ROS: no pelvic pain, no urinary c/o, NAD  Review of Systems    Objective   Physical Exam  Vitals and nursing note reviewed.   Constitutional:       Appearance: Normal appearance.   HENT:      Nose: Nose normal.   Eyes:      Pupils: Pupils are equal, round, and reactive to light.   Neck:      Thyroid: No thyroid mass.   Cardiovascular:      Rate and Rhythm: Normal rate and regular rhythm.   Pulmonary:      Effort: Pulmonary effort is normal.      Breath sounds: Normal breath sounds.   Chest:      Chest wall: No mass.   Breasts:     Right: Normal.      Left: Normal.   Abdominal:      Palpations: Abdomen is soft. There is no mass.      Tenderness: There is no abdominal tenderness.   Genitourinary:     General: Normal vulva.      Labia:         Right: No rash, tenderness or lesion.         Left: No rash, tenderness or lesion.       Vagina: Normal.      Uterus: Absent.       Adnexa: Right adnexa normal and left adnexa normal.      Comments: Vulvovaginal atrophic changes  Musculoskeletal:         General: Normal range of motion.   Lymphadenopathy:      Cervical: No cervical adenopathy.      Lower Body: No right inguinal adenopathy. No left inguinal adenopathy.   Skin:     General: Skin is warm and dry.   Neurological:      Mental Status: She is alert and oriented to person, place, and time.      Motor: Motor function is intact.       Gait: Gait is intact.   Psychiatric:         Mood and Affect: Mood normal.         Assessment/Plan   Diagnoses and all orders for this visit:  Well woman exam  OAB (overactive bladder)  -     mirabegron (Myrbetriq) 50 mg tablet extended release 24 hr 24 hr tablet; Take 1 tablet (50 mg) by mouth once daily.  Postmenopausal atrophic vaginitis  -     estradiol (Estrace) 0.01 % (0.1 mg/gram) vaginal cream; Apply pea-sized amount of cream to affected area twice a week.    Reassurance given re exam findings  Topicort not renewed at this time, as pt getting good relief of sx with Estrace cream.  She will contact our office if vulvar irritation not well managed with Estrace cream and Topicort is needed.   RTO AE/PRN       PETE Mcdaniels, ND 07/15/24 10:51 AM

## 2024-07-16 ENCOUNTER — HOSPITAL ENCOUNTER (OUTPATIENT)
Dept: RADIOLOGY | Facility: HOSPITAL | Age: 49
Discharge: HOME | End: 2024-07-16
Payer: COMMERCIAL

## 2024-07-16 DIAGNOSIS — Z12.31 ENCOUNTER FOR SCREENING MAMMOGRAM FOR MALIGNANT NEOPLASM OF BREAST: ICD-10-CM

## 2024-07-16 PROCEDURE — 77067 SCR MAMMO BI INCL CAD: CPT | Performed by: RADIOLOGY

## 2024-07-16 PROCEDURE — 77067 SCR MAMMO BI INCL CAD: CPT

## 2024-07-16 PROCEDURE — 77063 BREAST TOMOSYNTHESIS BI: CPT | Performed by: RADIOLOGY

## 2024-07-17 ENCOUNTER — APPOINTMENT (OUTPATIENT)
Dept: RADIOLOGY | Facility: HOSPITAL | Age: 49
End: 2024-07-17
Payer: COMMERCIAL

## 2024-07-19 ENCOUNTER — TELEPHONE (OUTPATIENT)
Dept: OBSTETRICS AND GYNECOLOGY | Facility: CLINIC | Age: 49
End: 2024-07-19
Payer: COMMERCIAL

## 2024-07-19 DIAGNOSIS — N32.81 OAB (OVERACTIVE BLADDER): ICD-10-CM

## 2024-07-22 RX ORDER — MIRABEGRON 50 MG/1
50 TABLET, EXTENDED RELEASE ORAL DAILY
Qty: 90 TABLET | Refills: 0 | Status: SHIPPED | OUTPATIENT
Start: 2024-07-22 | End: 2024-10-20

## 2024-08-07 NOTE — PROGRESS NOTES
"[Her brother is also my patient]    Chief complaint: mid back pain    Dear Dr. Worrell,    I had the pleasure of seeing your patient, Sienna Acuña, in clinic today. As you know, She is a pleasant 49 y.o. right handed woman with past medical history significant for hypothyroidism, gastroparesis, migraines, Raynaud's phenomenon, Von Willebrand disease, osteopenia on boniva , presents for consultation to evaluate back pain.     TIMELINE OF COMPLAINT(S):    She is a very active person, lifts weights, swims, etc., and about 5 years ago, she was working out particularly heavily, and slowly started feeling pain in her mid back.  She started seeing a chiropractor, this was not helping, and eventually x-ray showed a T8 compression fracture.  She was diagnosed with osteopenia and is on Boniva at this point.  Her pain was worse with carrying weight, even a purse, worse with sitting, and worse in the summer when she does swimming and more bodybuilding.  She still continues to be active, and does not let this limit her.    She saw my colleague Dr. Matt and got a few rounds of TPI's and intercostal nerve blocks and these helped significantly for up to 6 months at at a time. Dr. Matt left and the patient didn't know where to go, so she was \"been getting by\" on diclofenac patches and tylenol as needed.    Note from Dr. Matt 6/25/2020 personally reviewed today.      Pain:  LOCATION-mid back pain at bra line  RADIATION- none  CONSTANT or INTERMITTENT- Constant  SEVERITY/QUANTITY- 5/10  QUALITY- aching and dull  WEAKNESS-none  NUMBNESS/TINGLING- none  ASSOCIATED WITH- no falls  EXACERBATED BY-lifting  BETTER WITH-heat  TRIED-      Anti-Inflammatories: diclofenac patch help, (has some kidney disease) previously meloxicam doesn't remember, prednisone helped      Muscle relaxants: Previously Flexeril doesn't remember      Anti-depressants: Previously Cymbalta, nortriptyline didn't help      Neuroleptics: Topamax helps migraines, " previously gabapentin, Lyrica doesn't remember      LDN:    PHYSICAL THERAPY: Yes last time a few years ago, active continues with HEP regularly  CHIROPRACTIC MANIPULATION: Yes, didn't help  TENS unit: Yes, helps  ACUPUNCTURE TREATMENTS: No  DEEP TISSUE MASSAGE THERAPY: Yes, doenst help  OSTEOPATHIC MANIPULATION THERAPY: No    EMG/NCS: No    INJECTIONS:  -left sided T4 intercostal nerve block via the erector spinae plane; and trigger point injections with Dr. Matt in 2019 and 2020. These helped 100% for at least 6 months    IMAGING:    === 10/21/14 ===    MRI LUMBAR SPINE WO CONTRAST  -Sacral Tarlov cysts, mild degenerative changes L5-S1, L4-L5, L3-L4    Cervical spine MRI 3/8/2019:  Mild multilevel spondylosis    Thoracic MRI 12/13/2018:  Chronic deformity of the anterior inferior endplate T8, mild spondylosis without significant central canal or neuroforaminal narrowing, no cord compression or abnormal signal    === 11/20/15 ===    XR SPINE LUMBAR 2 OR 3 VW    - Impression -  No evidence of fracture or subluxation.      Lab Results   Component Value Date    HGBA1C 5.0 05/30/2024       FUNCTIONAL HISTORY: The patient is independent in all ADLs, mobility, and driving. The patient does not use any assistive device.    SH:  Lives in: [place]  Lives with:   Occupation: Physical therapist Assistant  Tobacco: No  Alcohol: 2-3 drinks weekly  Drugs: No  __________________________________    ROS: The patient denies any bowel or bladder incontinence/accidents, night sweats, fevers, chills, recent significant weight loss. A 14 point review of systems was reviewed with the patient and is as above and otherwise negative.  ROS questionnaire positive for muscle pain/tenderness, back pain, diarrhea    Physical Exam  Pulmonary:              PHYSICAL EXAM    GEN - Alert, well-developed, well-nourished, no acute distress  PSYCH - Cooperative, appropriate mood and affect  HEENT - NC/AT  RESP - Non-labored respirations, equal  expansion  CV - warm and well-perfused, No cyanosis or edema in extremities.  ABD- soft, ND  SKIN - No rash.    BACK/SPINE - Symmetric posture, no erythema, edema, or swelling.  Full range of motion without provocation of pain.  There was significant tenderness and left more than right trigger points identified over the thoracic paraspinal muscles at the bra line.  No tenderness over the spinous processes.        NEURO - UE strength 5/5 -  including shoulder abduction, biceps, triceps, wrist extensors, finger flexors, interossei, and    LE strength 5/5 -  including hip flexors, knee flexors, knee extensors, ankle dorsiflexors, ankle plantar flexors, and EHL   Sensation - intact to light touch in bilateral lower and upper extremities.   Reflexes - 2+ biceps, brachioradialis, triceps, patellar and Achilles reflexes bilaterally, Dean's negative bilaterally  GAIT - Normal base, normal stride length, non-antalgic. Able to toe walk and heel walk without difficulty.     IMPRESSION:    This is a pleasant 49 y.o. right handed woman with past medical history significant for hypothyroidism, gastroparesis, migraines, Raynaud's phenomenon, Von Willebrand disease, osteopenia on boniva, presents for consultation to evaluate back pain.  Physical exam is reassuring for lack of neurologic compromise.  Symptoms and physical exam findings consistent with myofascial pain/tension, trigger points which she is likely constantly reactivating with her workout regimens.  Initial instigating event may have been this T8 compression fracture.    -Patient Education: Extensive time was spent educating the patient on relevant anatomy, clinical findings and imaging, as well as discussing the potential diagnoses as discussed above.   -Diclofenac patch ordered  -Consider other medications in the future   -consider injections: Trigger point injections (handout provided), Ultrasound-guided intercostal nerve block, referral to Dr. Corona for erector  spinae blocks  -Thoracic x-ray ordered  -Consider repeat thoracic MRI  -Follow-up next available trigger point injections        The patient expressed understanding and agreement with the assessment and plan. Patient encouraged to contact us should they have any questions, concerns, or any changes in symptoms.     Thank you for allowing me to participate in the care of your patient.      ** Dictated with voice recognition software, please forgive any errors in grammar and/or spelling **

## 2024-08-08 ENCOUNTER — APPOINTMENT (OUTPATIENT)
Dept: PHYSICAL MEDICINE AND REHAB | Facility: CLINIC | Age: 49
End: 2024-08-08
Payer: COMMERCIAL

## 2024-08-08 ENCOUNTER — HOSPITAL ENCOUNTER (OUTPATIENT)
Dept: RADIOLOGY | Facility: CLINIC | Age: 49
Discharge: HOME | End: 2024-08-08
Payer: COMMERCIAL

## 2024-08-08 VITALS
DIASTOLIC BLOOD PRESSURE: 67 MMHG | SYSTOLIC BLOOD PRESSURE: 99 MMHG | BODY MASS INDEX: 20.77 KG/M2 | WEIGHT: 121 LBS | HEART RATE: 64 BPM | TEMPERATURE: 97.1 F

## 2024-08-08 DIAGNOSIS — G89.29 CHRONIC MIDLINE THORACIC BACK PAIN: ICD-10-CM

## 2024-08-08 DIAGNOSIS — G58.8 INTERCOSTAL NEURALGIA: ICD-10-CM

## 2024-08-08 DIAGNOSIS — M54.6 CHRONIC MIDLINE THORACIC BACK PAIN: ICD-10-CM

## 2024-08-08 DIAGNOSIS — M79.18 MYOFASCIAL PAIN: ICD-10-CM

## 2024-08-08 DIAGNOSIS — S29.012A STRAIN OF MID-BACK, INITIAL ENCOUNTER: Primary | ICD-10-CM

## 2024-08-08 PROCEDURE — 99203 OFFICE O/P NEW LOW 30 MIN: CPT | Performed by: PHYSICAL MEDICINE & REHABILITATION

## 2024-08-08 PROCEDURE — 72072 X-RAY EXAM THORAC SPINE 3VWS: CPT

## 2024-08-08 NOTE — LETTER
"August 8, 2024     Sarabjit Worrell MD  [address]  [facility] [address]    Patient: Sienna Acuña   YOB: 1975   Date of Visit: 8/8/2024       Dear Dr. Sarabjit Worrell MD:    Thank you for referring Sienna Acuña to me for evaluation. Below are my notes for this consultation.  If you have questions, please do not hesitate to call me. I look forward to following your patient along with you.       Sincerely,     Loretta Olson MD      CC: No Recipients  ______________________________________________________________________________________    [Her brother is also my patient]    Chief complaint: mid back pain    Dear Dr. Worrell,    I had the pleasure of seeing your patient, Sienna Acuña, in clinic today. As you know, She is a pleasant 49 y.o. right handed woman with past medical history significant for hypothyroidism, gastroparesis, migraines, Raynaud's phenomenon, Von Willebrand disease, osteopenia on boniva , presents for consultation to evaluate back pain.     TIMELINE OF COMPLAINT(S):    She is a very active person, lifts weights, swims, etc., and about 5 years ago, she was working out particularly heavily, and slowly started feeling pain in her mid back.  She started seeing a chiropractor, this was not helping, and eventually x-ray showed a T8 compression fracture.  She was diagnosed with osteopenia and is on Boniva at this point.  Her pain was worse with carrying weight, even a purse, worse with sitting, and worse in the summer when she does swimming and more bodybuilding.  She still continues to be active, and does not let this limit her.    She saw my colleague Dr. Matt and got a few rounds of TPI's and intercostal nerve blocks and these helped significantly for up to 6 months at at a time. Dr. Matt left and the patient didn't know where to go, so she was \"been getting by\" on diclofenac patches and tylenol as needed.    Note from Dr. Matt 6/25/2020 personally reviewed today.  "     Pain:  LOCATION-mid back pain at bra line  RADIATION- none  CONSTANT or INTERMITTENT- Constant  SEVERITY/QUANTITY- 5/10  QUALITY- aching and dull  WEAKNESS-none  NUMBNESS/TINGLING- none  ASSOCIATED WITH- no falls  EXACERBATED BY-lifting  BETTER WITH-heat  TRIED-      Anti-Inflammatories: diclofenac patch help, (has some kidney disease) previously meloxicam doesn't remember, prednisone helped      Muscle relaxants: Previously Flexeril doesn't remember      Anti-depressants: Previously Cymbalta, nortriptyline didn't help      Neuroleptics: Topamax helps migraines, previously gabapentin, Lyrica doesn't remember      LDN:    PHYSICAL THERAPY: Yes last time a few years ago, active continues with HEP regularly  CHIROPRACTIC MANIPULATION: Yes, didn't help  TENS unit: Yes, helps  ACUPUNCTURE TREATMENTS: No  DEEP TISSUE MASSAGE THERAPY: Yes, doenst help  OSTEOPATHIC MANIPULATION THERAPY: No    EMG/NCS: No    INJECTIONS:  -left sided T4 intercostal nerve block via the erector spinae plane; and trigger point injections with Dr. Matt in 2019 and 2020. These helped 100% for at least 6 months    IMAGING:    === 10/21/14 ===    MRI LUMBAR SPINE WO CONTRAST  -Sacral Tarlov cysts, mild degenerative changes L5-S1, L4-L5, L3-L4    Cervical spine MRI 3/8/2019:  Mild multilevel spondylosis    Thoracic MRI 12/13/2018:  Chronic deformity of the anterior inferior endplate T8, mild spondylosis without significant central canal or neuroforaminal narrowing, no cord compression or abnormal signal    === 11/20/15 ===    XR SPINE LUMBAR 2 OR 3 VW    - Impression -  No evidence of fracture or subluxation.      Lab Results   Component Value Date    HGBA1C 5.0 05/30/2024       FUNCTIONAL HISTORY: The patient is independent in all ADLs, mobility, and driving. The patient does not use any assistive device.    SH:  Lives in: [place]  Lives with:   Occupation: Physical therapist Assistant  Tobacco: No  Alcohol: 2-3 drinks weekly  Drugs:  No  __________________________________    ROS: The patient denies any bowel or bladder incontinence/accidents, night sweats, fevers, chills, recent significant weight loss. A 14 point review of systems was reviewed with the patient and is as above and otherwise negative.  ROS questionnaire positive for muscle pain/tenderness, back pain, diarrhea    Physical Exam  Pulmonary:              PHYSICAL EXAM    GEN - Alert, well-developed, well-nourished, no acute distress  PSYCH - Cooperative, appropriate mood and affect  HEENT - NC/AT  RESP - Non-labored respirations, equal expansion  CV - warm and well-perfused, No cyanosis or edema in extremities.  ABD- soft, ND  SKIN - No rash.    BACK/SPINE - Symmetric posture, no erythema, edema, or swelling.  Full range of motion without provocation of pain.  There was significant tenderness and left more than right trigger points identified over the thoracic paraspinal muscles at the bra line.  No tenderness over the spinous processes.        NEURO - UE strength 5/5 -  including shoulder abduction, biceps, triceps, wrist extensors, finger flexors, interossei, and    LE strength 5/5 -  including hip flexors, knee flexors, knee extensors, ankle dorsiflexors, ankle plantar flexors, and EHL   Sensation - intact to light touch in bilateral lower and upper extremities.   Reflexes - 2+ biceps, brachioradialis, triceps, patellar and Achilles reflexes bilaterally, Dean's negative bilaterally  GAIT - Normal base, normal stride length, non-antalgic. Able to toe walk and heel walk without difficulty.     IMPRESSION:    This is a pleasant 49 y.o. right handed woman with past medical history significant for hypothyroidism, gastroparesis, migraines, Raynaud's phenomenon, Von Willebrand disease, osteopenia on boniva, presents for consultation to evaluate back pain.  Physical exam is reassuring for lack of neurologic compromise.  Symptoms and physical exam findings consistent with myofascial  pain/tension, trigger points which she is likely constantly reactivating with her workout regimens.  Initial instigating event may have been this T8 compression fracture.    -Patient Education: Extensive time was spent educating the patient on relevant anatomy, clinical findings and imaging, as well as discussing the potential diagnoses as discussed above.   -Diclofenac patch ordered  -Consider other medications in the future   -consider injections: Trigger point injections (handout provided), Ultrasound-guided intercostal nerve block, referral to Dr. Corona for erector spinae blocks  -Thoracic x-ray ordered  -Consider repeat thoracic MRI  -Follow-up next available trigger point injections        The patient expressed understanding and agreement with the assessment and plan. Patient encouraged to contact us should they have any questions, concerns, or any changes in symptoms.     Thank you for allowing me to participate in the care of your patient.      ** Dictated with voice recognition software, please forgive any errors in grammar and/or spelling **

## 2024-09-11 NOTE — PATIENT INSTRUCTIONS
-Ice on and off for the next 24 hours if injection sites are sore. Do gentle range of motion exercises in each area that was injected. Try to do them every hour for about half a minute or so, in every direction that the affected part goes. No pool, bath, or hot tub today. Avoid heavy lifting for the next 2 days.    -Continue Diclofenac patch as needed  -Consider other medications in the future   -consider injections: repeat Trigger point injections as needed, Ultrasound-guided intercostal nerve block, referral to Dr. Matt for erector spinae blocks  -Consider repeat thoracic MRI  -Follow-up 3-5 weeks

## 2024-09-11 NOTE — PROGRESS NOTES
[Referred by brother]    Chief complaint: mid back pain follow-up    This is a pleasant 49 y.o. right handed woman with past medical history significant for hypothyroidism, gastroparesis, migraines, Raynaud's phenomenon, Von Willebrand disease, osteopenia on boniva , presents for follow-up follow-up back pain.    She was last seen here on 8/8/2024, at which point I ordered diclofenac patch. This helps    I ordered thoracic x-ray and this was done 8/8/2024, images and report personally reviewed and interpreted today.  Mild to moderate multilevel degenerative changes.    === 08/08/24 ===    XR THORACIC SPINE 3 VIEWS    - Impression -  Mild-to-moderate multilevel discogenic degenerative changes of the  thoracic spine, more pronounced in the midthoracic spine as described  above.    She is here today for bilateral thoracic trigger point injections.    She rates her pain as 5/10, previously 5/10.    Otherwise, there have been no changes to her medications or past medical history since last visit    ________________________________  9/12/2024: Bilateral thoracic trigger point injections    __________________________________  As a reminder:      TIMELINE OF COMPLAINT(S):    She is a very active person, lifts weights, swims, etc., and about 5 years ago, she was working out particularly heavily, and slowly started feeling pain in her mid back.  She started seeing a chiropractor, this was not helping, and eventually x-ray showed a T8 compression fracture.  She was diagnosed with osteopenia and is on Boniva at this point.  Her pain was worse with carrying weight, even a purse, worse with sitting, and worse in the summer when she does swimming and more bodybuilding.  She still continues to be active, and does not let this limit her.    She saw my colleague Dr. Matt and got a few rounds of TPI's and intercostal nerve blocks and these helped significantly for up to 6 months at at a time. Dr. Matt left and the patient didn't know  "where to go, so she was \"been getting by\" on diclofenac patches and tylenol as needed.    Note from Dr. Matt 6/25/2020 personally reviewed today.      Pain:  LOCATION-mid back pain at bra line  RADIATION- none  CONSTANT or INTERMITTENT- Constant  SEVERITY/QUANTITY- 5/10  QUALITY- aching and dull  WEAKNESS-none  NUMBNESS/TINGLING- none  ASSOCIATED WITH- no falls  EXACERBATED BY-lifting  BETTER WITH-heat  TRIED-      Anti-Inflammatories: diclofenac patch help, (has some kidney disease) previously meloxicam doesn't remember, prednisone helped      Muscle relaxants: Previously Flexeril doesn't remember      Anti-depressants: Previously Cymbalta, nortriptyline didn't help      Neuroleptics: Topamax helps migraines, previously gabapentin, Lyrica doesn't remember      LDN:    PHYSICAL THERAPY: Yes last time a few years ago, active continues with HEP regularly  CHIROPRACTIC MANIPULATION: Yes, didn't help  TENS unit: Yes, helps  ACUPUNCTURE TREATMENTS: No  DEEP TISSUE MASSAGE THERAPY: Yes, doenst help  OSTEOPATHIC MANIPULATION THERAPY: No    EMG/NCS: No    INJECTIONS:  -left sided T4 intercostal nerve block via the erector spinae plane; and trigger point injections with Dr. Matt in 2019 and 2020. These helped 100% for at least 6 months    IMAGING:    === 10/21/14 ===    MRI LUMBAR SPINE WO CONTRAST  -Sacral Tarlov cysts, mild degenerative changes L5-S1, L4-L5, L3-L4    Cervical spine MRI 3/8/2019:  Mild multilevel spondylosis    Thoracic MRI 12/13/2018:  Chronic deformity of the anterior inferior endplate T8, mild spondylosis without significant central canal or neuroforaminal narrowing, no cord compression or abnormal signal    === 11/20/15 ===    XR SPINE LUMBAR 2 OR 3 VW    - Impression -  No evidence of fracture or subluxation.    === 08/08/24 ===    XR THORACIC SPINE 3 VIEWS    - Impression -  Mild-to-moderate multilevel discogenic degenerative changes of the  thoracic spine, more pronounced in the midthoracic spine as " described  above.    Lab Results   Component Value Date    HGBA1C 5.0 05/30/2024       FUNCTIONAL HISTORY: The patient is independent in all ADLs, mobility, and driving. The patient does not use any assistive device.    SH:  Lives in: [place]  Lives with:   Occupation: Physical therapist Assistant  Tobacco: No  Alcohol: 2-3 drinks weekly  Drugs: No  __________________________________    ROS: The patient denies any bowel or bladder incontinence/accidents, night sweats, fevers, chills, recent significant weight loss. A 14 point review of systems was reviewed with the patient and is as above and otherwise negative.  ROS questionnaire positive for nausea, diarrhea, constipation, abdominal pain, back pain     PHYSICAL EXAM    GEN - Alert, well-developed, well-nourished, no acute distress  PSYCH - Cooperative, appropriate mood and affect  HEENT - NC/AT  RESP - Non-labored respirations, equal expansion  CV - warm and well-perfused, No cyanosis or edema in extremities.  ABD- soft, ND  SKIN - No rash.    Previous:    BACK/SPINE - Symmetric posture, no erythema, edema, or swelling.  Full range of motion without provocation of pain.  There was significant tenderness and left more than right trigger points identified over the thoracic paraspinal muscles at the bra line.  No tenderness over the spinous processes.      NEURO - UE strength 5/5 -  including shoulder abduction, biceps, triceps, wrist extensors, finger flexors, interossei, and    LE strength 5/5 -  including hip flexors, knee flexors, knee extensors, ankle dorsiflexors, ankle plantar flexors, and EHL   Sensation - intact to light touch in bilateral lower and upper extremities.   Reflexes - 2+ biceps, brachioradialis, triceps, patellar and Achilles reflexes bilaterally, Dean's negative bilaterally  GAIT - Normal base, normal stride length, non-antalgic. Able to toe walk and heel walk without difficulty.     PROCEDURE:    Lidocaine 1%- 6 cc's used, 0  cc's wasted  200mg/20mL (10mg/mL)  NDC 1877-4432-80  LOT [id number]  EXP 02/01/2025  Manuf: [employer]      Thoracic trigger point injections:    Description of the Procedure: The procedure, risks and alternative treatments were discussed with the patient. After written informed consent was obtained, the thoracic paraspinal muscles were palpated and marked. The skin was prepped three times with alcohol. Using a 27 gauge 1.5 inch needle, after negative aspiration, the trigger points in each muscle were injected with a total of 6 cc's of 1% lidocaine, spread equally into 6 sites. Twitch responses were observed in the musculature. The patient tolerated the procedure well with no immediate complications or bleeding.      Plan:   1. The patient was instructed in post-procedural care.  2. The patient was asked to apply moist heat and or ice for the next 24 hours and to perform daily gentle stretching exercises.     Physical Exam  Pulmonary:              IMPRESSION:    This is a pleasant 49 y.o. right handed woman with past medical history significant for hypothyroidism, gastroparesis, migraines, Raynaud's phenomenon, Von Willebrand disease, osteopenia on boniva, presents for follow-up of back pain.  Physical exam is reassuring for lack of neurologic compromise.  Symptoms and physical exam findings consistent with myofascial pain/tension, trigger points which she is likely constantly reactivating with her workout regimens.  Initial instigating event may have been this T8 compression fracture.    -Bilateral thoracic trigger point injections performed as above.  There were no complications and she tolerated the procedure well.  She was provided with postprocedure instructions.  -Continue Diclofenac patch as needed  -Consider other medications in the future   -consider injections: repeat Trigger point injections as needed, Ultrasound-guided intercostal nerve block, referral to Dr. Matt for erector spinae blocks  -Consider repeat  thoracic MRI  -Follow-up 3-5 weeks        The patient expressed understanding and agreement with the assessment and plan. Patient encouraged to contact us should they have any questions, concerns, or any changes in symptoms.     Thank you for allowing me to participate in the care of your patient.      ** Dictated with voice recognition software, please forgive any errors in grammar and/or spelling **

## 2024-09-12 ENCOUNTER — APPOINTMENT (OUTPATIENT)
Dept: PHYSICAL MEDICINE AND REHAB | Facility: CLINIC | Age: 49
End: 2024-09-12
Payer: COMMERCIAL

## 2024-09-12 VITALS
BODY MASS INDEX: 21.51 KG/M2 | WEIGHT: 126 LBS | OXYGEN SATURATION: 96 % | SYSTOLIC BLOOD PRESSURE: 88 MMHG | TEMPERATURE: 97.8 F | HEIGHT: 64 IN | DIASTOLIC BLOOD PRESSURE: 64 MMHG | HEART RATE: 80 BPM

## 2024-09-12 DIAGNOSIS — G89.29 CHRONIC MIDLINE THORACIC BACK PAIN: ICD-10-CM

## 2024-09-12 DIAGNOSIS — G58.8 INTERCOSTAL NEURALGIA: ICD-10-CM

## 2024-09-12 DIAGNOSIS — M79.18 MYOFASCIAL PAIN: Primary | ICD-10-CM

## 2024-09-12 DIAGNOSIS — S29.012A STRAIN OF MID-BACK, INITIAL ENCOUNTER: ICD-10-CM

## 2024-09-12 DIAGNOSIS — M54.6 CHRONIC MIDLINE THORACIC BACK PAIN: ICD-10-CM

## 2024-09-12 PROCEDURE — 3008F BODY MASS INDEX DOCD: CPT | Performed by: PHYSICAL MEDICINE & REHABILITATION

## 2024-09-12 PROCEDURE — 20553 NJX 1/MLT TRIGGER POINTS 3/>: CPT | Performed by: PHYSICAL MEDICINE & REHABILITATION

## 2024-09-12 ASSESSMENT — PAIN SCALES - GENERAL: PAINLEVEL: 5

## 2024-09-13 ENCOUNTER — LAB (OUTPATIENT)
Dept: LAB | Facility: LAB | Age: 49
End: 2024-09-13
Payer: COMMERCIAL

## 2024-09-13 DIAGNOSIS — R76.8 POSITIVE ANA (ANTINUCLEAR ANTIBODY): Primary | ICD-10-CM

## 2024-09-13 DIAGNOSIS — R76.8 POSITIVE ANA (ANTINUCLEAR ANTIBODY): ICD-10-CM

## 2024-09-13 DIAGNOSIS — M25.50 ARTHRALGIA, UNSPECIFIED JOINT: ICD-10-CM

## 2024-09-13 DIAGNOSIS — D64.9 ANEMIA, UNSPECIFIED TYPE: ICD-10-CM

## 2024-09-13 LAB
BASOPHILS # BLD AUTO: 0.08 X10*3/UL (ref 0–0.1)
BASOPHILS NFR BLD AUTO: 1 %
EOSINOPHIL # BLD AUTO: 0.15 X10*3/UL (ref 0–0.7)
EOSINOPHIL NFR BLD AUTO: 1.8 %
ERYTHROCYTE [DISTWIDTH] IN BLOOD BY AUTOMATED COUNT: 12.3 % (ref 11.5–14.5)
ERYTHROCYTE [SEDIMENTATION RATE] IN BLOOD BY WESTERGREN METHOD: <1 MM/H (ref 0–20)
HCT VFR BLD AUTO: 40.9 % (ref 36–46)
HGB BLD-MCNC: 13.4 G/DL (ref 12–16)
IMM GRANULOCYTES # BLD AUTO: 0.03 X10*3/UL (ref 0–0.7)
IMM GRANULOCYTES NFR BLD AUTO: 0.4 % (ref 0–0.9)
LYMPHOCYTES # BLD AUTO: 2.58 X10*3/UL (ref 1.2–4.8)
LYMPHOCYTES NFR BLD AUTO: 31.3 %
MCH RBC QN AUTO: 32.8 PG (ref 26–34)
MCHC RBC AUTO-ENTMCNC: 32.8 G/DL (ref 32–36)
MCV RBC AUTO: 100 FL (ref 80–100)
MONOCYTES # BLD AUTO: 0.88 X10*3/UL (ref 0.1–1)
MONOCYTES NFR BLD AUTO: 10.7 %
NEUTROPHILS # BLD AUTO: 4.52 X10*3/UL (ref 1.2–7.7)
NEUTROPHILS NFR BLD AUTO: 54.8 %
NRBC BLD-RTO: 0 /100 WBCS (ref 0–0)
PLATELET # BLD AUTO: 322 X10*3/UL (ref 150–450)
RBC # BLD AUTO: 4.09 X10*6/UL (ref 4–5.2)
WBC # BLD AUTO: 8.2 X10*3/UL (ref 4.4–11.3)

## 2024-09-13 PROCEDURE — 80053 COMPREHEN METABOLIC PANEL: CPT

## 2024-09-13 PROCEDURE — 82728 ASSAY OF FERRITIN: CPT

## 2024-09-13 PROCEDURE — 83550 IRON BINDING TEST: CPT

## 2024-09-13 PROCEDURE — 36415 COLL VENOUS BLD VENIPUNCTURE: CPT

## 2024-09-13 PROCEDURE — 83540 ASSAY OF IRON: CPT

## 2024-09-13 PROCEDURE — 86140 C-REACTIVE PROTEIN: CPT

## 2024-09-13 PROCEDURE — 85652 RBC SED RATE AUTOMATED: CPT

## 2024-09-13 PROCEDURE — 85025 COMPLETE CBC W/AUTO DIFF WBC: CPT

## 2024-09-14 LAB
ALBUMIN SERPL BCP-MCNC: 3.9 G/DL (ref 3.4–5)
ALP SERPL-CCNC: 46 U/L (ref 33–110)
ALT SERPL W P-5'-P-CCNC: 21 U/L (ref 7–45)
ANION GAP SERPL CALC-SCNC: 9 MMOL/L (ref 10–20)
AST SERPL W P-5'-P-CCNC: 25 U/L (ref 9–39)
BILIRUB SERPL-MCNC: 0.3 MG/DL (ref 0–1.2)
BUN SERPL-MCNC: 25 MG/DL (ref 6–23)
CALCIUM SERPL-MCNC: 8.5 MG/DL (ref 8.6–10.3)
CHLORIDE SERPL-SCNC: 104 MMOL/L (ref 98–107)
CO2 SERPL-SCNC: 27 MMOL/L (ref 21–32)
CREAT SERPL-MCNC: 0.98 MG/DL (ref 0.5–1.05)
CRP SERPL-MCNC: <0.1 MG/DL
EGFRCR SERPLBLD CKD-EPI 2021: 72 ML/MIN/1.73M*2
FERRITIN SERPL-MCNC: 65 NG/ML (ref 8–150)
GLUCOSE SERPL-MCNC: 79 MG/DL (ref 74–99)
IRON SATN MFR SERPL: 16 % (ref 25–45)
IRON SERPL-MCNC: 51 UG/DL (ref 35–150)
POTASSIUM SERPL-SCNC: 3.7 MMOL/L (ref 3.5–5.3)
PROT SERPL-MCNC: 6.4 G/DL (ref 6.4–8.2)
SODIUM SERPL-SCNC: 136 MMOL/L (ref 136–145)
TIBC SERPL-MCNC: 314 UG/DL (ref 240–445)
UIBC SERPL-MCNC: 263 UG/DL (ref 110–370)

## 2024-10-10 ENCOUNTER — HOSPITAL ENCOUNTER (OUTPATIENT)
Dept: RADIOLOGY | Facility: HOSPITAL | Age: 49
Discharge: HOME | End: 2024-10-10
Payer: COMMERCIAL

## 2024-10-10 DIAGNOSIS — M25.562 PAIN IN LEFT KNEE: ICD-10-CM

## 2024-10-10 PROCEDURE — 73721 MRI JNT OF LWR EXTRE W/O DYE: CPT | Mod: LT

## 2024-10-10 PROCEDURE — 73700 CT LOWER EXTREMITY W/O DYE: CPT | Mod: LT

## 2024-10-11 ENCOUNTER — APPOINTMENT (OUTPATIENT)
Dept: RHEUMATOLOGY | Facility: CLINIC | Age: 49
End: 2024-10-11
Payer: COMMERCIAL

## 2024-10-11 VITALS
DIASTOLIC BLOOD PRESSURE: 55 MMHG | BODY MASS INDEX: 20.53 KG/M2 | WEIGHT: 123.2 LBS | SYSTOLIC BLOOD PRESSURE: 102 MMHG | TEMPERATURE: 97.2 F | HEIGHT: 65 IN | HEART RATE: 56 BPM

## 2024-10-11 DIAGNOSIS — R76.8 POSITIVE ANA (ANTINUCLEAR ANTIBODY): Primary | ICD-10-CM

## 2024-10-11 DIAGNOSIS — T69.1XXD CHILBLAINS, SUBSEQUENT ENCOUNTER: ICD-10-CM

## 2024-10-11 DIAGNOSIS — M19.90 OSTEOARTHRITIS, UNSPECIFIED OSTEOARTHRITIS TYPE, UNSPECIFIED SITE: ICD-10-CM

## 2024-10-11 PROCEDURE — 1036F TOBACCO NON-USER: CPT | Performed by: INTERNAL MEDICINE

## 2024-10-11 PROCEDURE — 3008F BODY MASS INDEX DOCD: CPT | Performed by: INTERNAL MEDICINE

## 2024-10-11 PROCEDURE — 99214 OFFICE O/P EST MOD 30 MIN: CPT | Performed by: INTERNAL MEDICINE

## 2024-10-11 NOTE — PROGRESS NOTES
Subjective   Patient ID: Sienna Acuña is a 49 y.o. female who presents for Follow-up.    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  She reports Raynaud like symptoms started a couple years ago.  She is having rash in her face, and low grade fever, joint pain episodes lasts a few days.  She endorses fatigue and low grade fever, joint pain.   She reports dry eyes, dry mouth. Oral ulcer, hair loss  Dermatology performed skin biopsy in her skin lesions and Pathology showed chilblain lupus. Pernio, chilblain vasculitis.  Her biopsy revealed positive JENNIFER 1/160 pos homogenous, neg HANSA, dsDNA, RF and normal ESR.  Family h/o arthritis in her uncle.     08/23:  The patient noted chillblain like skin lesions in her distal fingers in May, lasted a few days.  She does not have any joint pain, skin rashes, oral ulcer or any other active problem.      02/24:  She reports Raynaud like symptoms, and also flu like episodes characterized by low grade fever, fatigue, muscle pain last a few days. Also, she has noted small tender nodules in her fingers.  She denies any joint pain, swelling, skin rashes or oral ulcer.     10/24:  She had chillblain like skin lesions in this summer, but today, she is doing well.  She is also having knee pain and hand joint pain     ROS  Joint pain in hands: negative   Joint swelling: negative  Morning stiffness and duration: negative   strength: normal  Oral ulcer: negative  Genital ulcer: negative  Raynaud phenomenon: negative  Chest pain/dyspnea: negative  Low back pain: negative  Visual problem: negative  Dry eyes/dry mouth: negative  Skin rash/scaling/psoriasis: negative       Objective     PEXAM  VS reviewed, WNL  General: Alert, no distress   HEENT: Normocephalic/atraumatic, No alopecia. PERRLA. Sclera white, conjunctiva pink, no malar rash. no oral or nasal ulcer. Oral cavity pink and moist, no erythema or exudate, dentition good.   Neck: supple  Respiratory: CTA B, no  adventitious breath sounds  Cardiac: RRR, no murmurs, carotid, or bruits  Abdominal: symmetrical, soft, non-tender, non-distended, normoactive BSx4 quadrants, no CVA tenderness or suprapubic tenderness  MSK: Joints of upper and lower extremities were assessed for synovitis and ROM.    Today she has no evidence of synovitis in the joints of her hands or wrists, tender joint count 0, swollen joint count 0   Extremities: no clubbing, no cyanosis, no edema  Skin: Skin warm and moist.   Neuro: non-focal, Strength 5/5 throughout. Normal gait. No cerebellar pathologic exam     Assessment/Plan   48 yo female with positive JENNIFER and chilblain-like hand lesions  She reports Raynaud like symptoms.  She complains about fever, joint pain and rash episodes.  Her episodes lasts a few days.  She has noted small tender nodules in her fingers  PE showed only one small tender nodule in her right second finger, but no synovitis.  She has knee, hip and hand joint OA, no inflammatory arthritis.  Her recent lab tests showed normal acute phases, CBC.  Also, her HANSA, C3, C4, and urine were NL.    -will see her Orthopedist for knee pain  -if she has severe chilblain, Raynaud like symptoms in the winter, we will start nifedipine  -will see her in the clinic once a year

## 2024-10-24 ENCOUNTER — ANESTHESIA EVENT (OUTPATIENT)
Dept: OPERATING ROOM | Facility: HOSPITAL | Age: 49
End: 2024-10-24
Payer: COMMERCIAL

## 2024-10-28 ENCOUNTER — TELEPHONE (OUTPATIENT)
Dept: PREADMISSION TESTING | Facility: HOSPITAL | Age: 49
End: 2024-10-28
Payer: COMMERCIAL

## 2024-10-29 ENCOUNTER — APPOINTMENT (OUTPATIENT)
Dept: PHYSICAL MEDICINE AND REHAB | Facility: CLINIC | Age: 49
End: 2024-10-29
Payer: COMMERCIAL

## 2024-10-29 VITALS
DIASTOLIC BLOOD PRESSURE: 57 MMHG | BODY MASS INDEX: 20.66 KG/M2 | HEIGHT: 65 IN | HEART RATE: 56 BPM | SYSTOLIC BLOOD PRESSURE: 98 MMHG | WEIGHT: 124 LBS | TEMPERATURE: 97.4 F | OXYGEN SATURATION: 98 %

## 2024-10-29 DIAGNOSIS — M79.18 MYOFASCIAL PAIN: Primary | ICD-10-CM

## 2024-10-29 DIAGNOSIS — M54.6 CHRONIC MIDLINE THORACIC BACK PAIN: ICD-10-CM

## 2024-10-29 DIAGNOSIS — G58.8 INTERCOSTAL NEURALGIA: ICD-10-CM

## 2024-10-29 DIAGNOSIS — G89.29 CHRONIC MIDLINE THORACIC BACK PAIN: ICD-10-CM

## 2024-10-29 DIAGNOSIS — S29.012A STRAIN OF MID-BACK, INITIAL ENCOUNTER: ICD-10-CM

## 2024-10-29 PROCEDURE — 3008F BODY MASS INDEX DOCD: CPT | Performed by: PHYSICAL MEDICINE & REHABILITATION

## 2024-10-29 PROCEDURE — 20553 NJX 1/MLT TRIGGER POINTS 3/>: CPT | Performed by: PHYSICAL MEDICINE & REHABILITATION

## 2024-10-29 ASSESSMENT — PAIN SCALES - GENERAL: PAINLEVEL_OUTOF10: 5

## 2024-10-30 ENCOUNTER — HOSPITAL ENCOUNTER (OUTPATIENT)
Facility: HOSPITAL | Age: 49
Setting detail: OUTPATIENT SURGERY
Discharge: HOME | End: 2024-10-30
Attending: ORTHOPAEDIC SURGERY | Admitting: ORTHOPAEDIC SURGERY
Payer: COMMERCIAL

## 2024-10-30 ENCOUNTER — ANESTHESIA (OUTPATIENT)
Dept: OPERATING ROOM | Facility: HOSPITAL | Age: 49
End: 2024-10-30
Payer: COMMERCIAL

## 2024-10-30 VITALS
WEIGHT: 121.91 LBS | RESPIRATION RATE: 12 BRPM | HEART RATE: 49 BPM | HEIGHT: 64 IN | SYSTOLIC BLOOD PRESSURE: 130 MMHG | TEMPERATURE: 96.3 F | OXYGEN SATURATION: 98 % | BODY MASS INDEX: 20.81 KG/M2 | DIASTOLIC BLOOD PRESSURE: 62 MMHG

## 2024-10-30 PROBLEM — M71.22 BAKER'S CYST OF KNEE, LEFT: Status: ACTIVE | Noted: 2024-10-30

## 2024-10-30 LAB — PREGNANCY TEST URINE, POC: NEGATIVE

## 2024-10-30 PROCEDURE — 3700000001 HC GENERAL ANESTHESIA TIME - INITIAL BASE CHARGE: Performed by: ORTHOPAEDIC SURGERY

## 2024-10-30 PROCEDURE — 2500000004 HC RX 250 GENERAL PHARMACY W/ HCPCS (ALT 636 FOR OP/ED): Performed by: ANESTHESIOLOGY

## 2024-10-30 PROCEDURE — 2500000005 HC RX 250 GENERAL PHARMACY W/O HCPCS: Performed by: ORTHOPAEDIC SURGERY

## 2024-10-30 PROCEDURE — 2500000005 HC RX 250 GENERAL PHARMACY W/O HCPCS: Performed by: NURSE ANESTHETIST, CERTIFIED REGISTERED

## 2024-10-30 PROCEDURE — 7100000009 HC PHASE TWO TIME - INITIAL BASE CHARGE: Performed by: ORTHOPAEDIC SURGERY

## 2024-10-30 PROCEDURE — 2500000004 HC RX 250 GENERAL PHARMACY W/ HCPCS (ALT 636 FOR OP/ED): Performed by: NURSE ANESTHETIST, CERTIFIED REGISTERED

## 2024-10-30 PROCEDURE — 3700000002 HC GENERAL ANESTHESIA TIME - EACH INCREMENTAL 1 MINUTE: Performed by: ORTHOPAEDIC SURGERY

## 2024-10-30 PROCEDURE — 7100000010 HC PHASE TWO TIME - EACH INCREMENTAL 1 MINUTE: Performed by: ORTHOPAEDIC SURGERY

## 2024-10-30 PROCEDURE — 7100000001 HC RECOVERY ROOM TIME - INITIAL BASE CHARGE: Performed by: ORTHOPAEDIC SURGERY

## 2024-10-30 PROCEDURE — 3600000008 HC OR TIME - EACH INCREMENTAL 1 MINUTE - PROCEDURE LEVEL THREE: Performed by: ORTHOPAEDIC SURGERY

## 2024-10-30 PROCEDURE — 7100000002 HC RECOVERY ROOM TIME - EACH INCREMENTAL 1 MINUTE: Performed by: ORTHOPAEDIC SURGERY

## 2024-10-30 PROCEDURE — 3600000003 HC OR TIME - INITIAL BASE CHARGE - PROCEDURE LEVEL THREE: Performed by: ORTHOPAEDIC SURGERY

## 2024-10-30 PROCEDURE — 2720000007 HC OR 272 NO HCPCS: Performed by: ORTHOPAEDIC SURGERY

## 2024-10-30 RX ORDER — KETOROLAC TROMETHAMINE 30 MG/ML
INJECTION, SOLUTION INTRAMUSCULAR; INTRAVENOUS AS NEEDED
Status: DISCONTINUED | OUTPATIENT
Start: 2024-10-30 | End: 2024-10-30

## 2024-10-30 RX ORDER — SODIUM CHLORIDE, SODIUM LACTATE, POTASSIUM CHLORIDE, CALCIUM CHLORIDE 600; 310; 30; 20 MG/100ML; MG/100ML; MG/100ML; MG/100ML
20 INJECTION, SOLUTION INTRAVENOUS CONTINUOUS
Status: DISCONTINUED | OUTPATIENT
Start: 2024-10-30 | End: 2024-10-30 | Stop reason: HOSPADM

## 2024-10-30 RX ORDER — MIDAZOLAM HYDROCHLORIDE 1 MG/ML
INJECTION INTRAMUSCULAR; INTRAVENOUS AS NEEDED
Status: DISCONTINUED | OUTPATIENT
Start: 2024-10-30 | End: 2024-10-30

## 2024-10-30 RX ORDER — HYDROCODONE BITARTRATE AND ACETAMINOPHEN 5; 325 MG/1; MG/1
1 TABLET ORAL EVERY 6 HOURS PRN
COMMUNITY
Start: 2024-10-30

## 2024-10-30 RX ORDER — DIPHENHYDRAMINE HYDROCHLORIDE 50 MG/ML
12.5 INJECTION INTRAMUSCULAR; INTRAVENOUS ONCE AS NEEDED
Status: DISCONTINUED | OUTPATIENT
Start: 2024-10-30 | End: 2024-10-30 | Stop reason: HOSPADM

## 2024-10-30 RX ORDER — TRANEXAMIC ACID 100 MG/ML
INJECTION, SOLUTION INTRAVENOUS AS NEEDED
Status: DISCONTINUED | OUTPATIENT
Start: 2024-10-30 | End: 2024-10-30

## 2024-10-30 RX ORDER — ALBUTEROL SULFATE 0.83 MG/ML
2.5 SOLUTION RESPIRATORY (INHALATION) ONCE AS NEEDED
Status: DISCONTINUED | OUTPATIENT
Start: 2024-10-30 | End: 2024-10-30 | Stop reason: HOSPADM

## 2024-10-30 RX ORDER — DROPERIDOL 2.5 MG/ML
0.62 INJECTION, SOLUTION INTRAMUSCULAR; INTRAVENOUS ONCE AS NEEDED
Status: COMPLETED | OUTPATIENT
Start: 2024-10-30 | End: 2024-10-30

## 2024-10-30 RX ORDER — CEFAZOLIN 1 G/1
INJECTION, POWDER, FOR SOLUTION INTRAVENOUS AS NEEDED
Status: DISCONTINUED | OUTPATIENT
Start: 2024-10-30 | End: 2024-10-30

## 2024-10-30 RX ORDER — FENTANYL CITRATE 50 UG/ML
INJECTION, SOLUTION INTRAMUSCULAR; INTRAVENOUS AS NEEDED
Status: DISCONTINUED | OUTPATIENT
Start: 2024-10-30 | End: 2024-10-30

## 2024-10-30 RX ORDER — SODIUM CHLORIDE 0.9 G/100ML
IRRIGANT IRRIGATION AS NEEDED
Status: DISCONTINUED | OUTPATIENT
Start: 2024-10-30 | End: 2024-10-30 | Stop reason: HOSPADM

## 2024-10-30 RX ORDER — SODIUM CHLORIDE, SODIUM LACTATE, POTASSIUM CHLORIDE, CALCIUM CHLORIDE 600; 310; 30; 20 MG/100ML; MG/100ML; MG/100ML; MG/100ML
100 INJECTION, SOLUTION INTRAVENOUS CONTINUOUS
Status: DISCONTINUED | OUTPATIENT
Start: 2024-10-30 | End: 2024-10-30 | Stop reason: HOSPADM

## 2024-10-30 RX ORDER — ONDANSETRON HYDROCHLORIDE 2 MG/ML
4 INJECTION, SOLUTION INTRAVENOUS ONCE AS NEEDED
Status: COMPLETED | OUTPATIENT
Start: 2024-10-30 | End: 2024-10-30

## 2024-10-30 RX ORDER — OXYCODONE HYDROCHLORIDE 5 MG/1
5 TABLET ORAL EVERY 4 HOURS PRN
Status: DISCONTINUED | OUTPATIENT
Start: 2024-10-30 | End: 2024-10-30 | Stop reason: HOSPADM

## 2024-10-30 RX ORDER — LIOTHYRONINE SODIUM 5 UG/1
5 TABLET ORAL 2 TIMES DAILY
COMMUNITY

## 2024-10-30 RX ORDER — LIDOCAINE HCL/PF 100 MG/5ML
SYRINGE (ML) INTRAVENOUS AS NEEDED
Status: DISCONTINUED | OUTPATIENT
Start: 2024-10-30 | End: 2024-10-30

## 2024-10-30 RX ORDER — ONDANSETRON HYDROCHLORIDE 2 MG/ML
INJECTION, SOLUTION INTRAVENOUS AS NEEDED
Status: DISCONTINUED | OUTPATIENT
Start: 2024-10-30 | End: 2024-10-30

## 2024-10-30 RX ORDER — MEPERIDINE HYDROCHLORIDE 25 MG/ML
12.5 INJECTION INTRAMUSCULAR; INTRAVENOUS; SUBCUTANEOUS EVERY 10 MIN PRN
Status: DISCONTINUED | OUTPATIENT
Start: 2024-10-30 | End: 2024-10-30 | Stop reason: HOSPADM

## 2024-10-30 RX ORDER — PROPOFOL 10 MG/ML
INJECTION, EMULSION INTRAVENOUS AS NEEDED
Status: DISCONTINUED | OUTPATIENT
Start: 2024-10-30 | End: 2024-10-30

## 2024-10-30 RX ORDER — BUPIVACAINE HCL/EPINEPHRINE 0.5-1:200K
VIAL (ML) INJECTION AS NEEDED
Status: DISCONTINUED | OUTPATIENT
Start: 2024-10-30 | End: 2024-10-30 | Stop reason: HOSPADM

## 2024-10-30 SDOH — HEALTH STABILITY: MENTAL HEALTH: CURRENT SMOKER: 0

## 2024-10-30 ASSESSMENT — ENCOUNTER SYMPTOMS
JOINT SWELLING: 1
CARDIOVASCULAR NEGATIVE: 1
CONSTITUTIONAL NEGATIVE: 1
BACK PAIN: 1
RESPIRATORY NEGATIVE: 1

## 2024-10-30 ASSESSMENT — COLUMBIA-SUICIDE SEVERITY RATING SCALE - C-SSRS
1. IN THE PAST MONTH, HAVE YOU WISHED YOU WERE DEAD OR WISHED YOU COULD GO TO SLEEP AND NOT WAKE UP?: NO
2. HAVE YOU ACTUALLY HAD ANY THOUGHTS OF KILLING YOURSELF?: NO
6. HAVE YOU EVER DONE ANYTHING, STARTED TO DO ANYTHING, OR PREPARED TO DO ANYTHING TO END YOUR LIFE?: NO

## 2024-10-30 ASSESSMENT — PAIN SCALES - GENERAL
PAIN_LEVEL: 2
PAINLEVEL_OUTOF10: 0 - NO PAIN
PAINLEVEL_OUTOF10: 4
PAINLEVEL_OUTOF10: 7
PAINLEVEL_OUTOF10: 0 - NO PAIN

## 2024-10-30 ASSESSMENT — PAIN - FUNCTIONAL ASSESSMENT
PAIN_FUNCTIONAL_ASSESSMENT: 0-10
PAIN_FUNCTIONAL_ASSESSMENT: 0-10

## 2024-11-14 PROBLEM — R23.3 EASY BRUISING: Status: ACTIVE | Noted: 2024-11-14

## 2024-11-14 PROBLEM — N12 PYELONEPHRITIS: Status: RESOLVED | Noted: 2024-11-14 | Resolved: 2024-11-14

## 2024-11-14 PROBLEM — N90.89 IRRITATION OF VULVA: Status: RESOLVED | Noted: 2024-11-14 | Resolved: 2024-11-14

## 2024-11-14 PROBLEM — R93.7 ABNORMAL CT OF THORACIC SPINE: Status: RESOLVED | Noted: 2017-03-31 | Resolved: 2024-11-14

## 2024-11-14 PROBLEM — M81.8 OTHER OSTEOPOROSIS WITHOUT CURRENT PATHOLOGICAL FRACTURE: Status: ACTIVE | Noted: 2024-04-26

## 2024-11-14 PROBLEM — K62.5 BRIGHT RED BLOOD PER RECTUM: Status: RESOLVED | Noted: 2017-08-22 | Resolved: 2024-11-14

## 2024-11-14 PROBLEM — R10.12 CHRONIC LUQ PAIN: Status: ACTIVE | Noted: 2017-03-30

## 2024-11-14 PROBLEM — F33.0 MILD EPISODE OF RECURRENT MAJOR DEPRESSIVE DISORDER (CMS-HCC): Status: ACTIVE | Noted: 2021-07-06

## 2024-11-14 PROBLEM — R74.8 ELEVATED AMYLASE: Status: RESOLVED | Noted: 2024-11-14 | Resolved: 2024-11-14

## 2024-11-14 PROBLEM — D68.00 VON WILLEBRAND DISEASE (MULTI): Status: ACTIVE | Noted: 2024-11-14

## 2024-11-14 PROBLEM — D64.9 ANEMIA: Status: ACTIVE | Noted: 2017-08-22

## 2024-11-14 PROBLEM — T81.43XA POSTPROCEDURAL INTRAABDOMINAL ABSCESS: Status: RESOLVED | Noted: 2017-09-01 | Resolved: 2024-11-14

## 2024-11-14 PROBLEM — G89.18 POSTOPERATIVE PAIN: Status: RESOLVED | Noted: 2017-08-22 | Resolved: 2024-11-14

## 2024-11-14 PROBLEM — B49 INFECTION DUE TO FUNGUS: Status: RESOLVED | Noted: 2024-11-14 | Resolved: 2024-11-14

## 2024-11-14 PROBLEM — N39.41 URGENCY INCONTINENCE: Status: ACTIVE | Noted: 2021-07-06

## 2024-11-14 PROBLEM — D68.9 CLOTTING DISORDER (MULTI): Status: ACTIVE | Noted: 2024-11-14

## 2024-11-14 PROBLEM — E87.6 HYPOKALEMIA: Status: ACTIVE | Noted: 2024-11-14

## 2024-11-14 PROBLEM — M47.22 CERVICAL RADICULOPATHY DUE TO DEGENERATIVE JOINT DISEASE OF SPINE: Status: ACTIVE | Noted: 2018-12-14

## 2024-11-14 PROBLEM — K65.1 POSTPROCEDURAL INTRAABDOMINAL ABSCESS: Status: RESOLVED | Noted: 2017-09-01 | Resolved: 2024-11-14

## 2024-11-14 PROBLEM — L70.9 ACNE: Status: ACTIVE | Noted: 2024-11-14

## 2024-11-14 PROBLEM — R87.619 UNSPECIFIED ABNORMAL CYTOLOGICAL FINDINGS IN SPECIMENS FROM CERVIX UTERI: Status: RESOLVED | Noted: 2024-11-14 | Resolved: 2024-11-14

## 2024-11-14 PROBLEM — M85.80 DECREASED BONE DENSITY: Status: ACTIVE | Noted: 2019-01-29

## 2024-11-14 PROBLEM — Q68.8 OS TRIGONUM SYNDROME: Status: ACTIVE | Noted: 2021-01-21

## 2024-11-14 PROBLEM — G43.909 MIGRAINES: Status: ACTIVE | Noted: 2024-11-14

## 2024-11-14 PROBLEM — M84.374A STRESS FRACTURE OF RIGHT CALCANEUS: Status: RESOLVED | Noted: 2021-02-04 | Resolved: 2024-11-14

## 2024-11-14 PROBLEM — E55.9 VITAMIN D DEFICIENCY: Status: ACTIVE | Noted: 2018-12-14

## 2024-11-14 PROBLEM — M85.89 OSTEOPENIA OF MULTIPLE SITES: Status: ACTIVE | Noted: 2017-04-10

## 2024-11-14 PROBLEM — N92.1 MENOMETRORRHAGIA: Status: ACTIVE | Noted: 2024-11-14

## 2024-11-14 PROBLEM — G89.29 CHRONIC LUQ PAIN: Status: ACTIVE | Noted: 2017-03-30

## 2024-11-14 PROBLEM — M79.89 SWELLING OF EXTREMITY: Status: RESOLVED | Noted: 2024-11-14 | Resolved: 2024-11-14

## 2024-11-14 PROBLEM — D23.9 DYSPLASTIC NEVUS: Status: ACTIVE | Noted: 2024-11-14

## 2024-11-14 NOTE — PROGRESS NOTES
"Subjective   Patient ID: Sienna Acuña is a 49 y.o. female who presents for Establish Care (Would like sea sickness patches- she's going on a cruise 12/28).    Migraines: On topiramate, qulipta, and rizatriptan for breakthrough. She has zofran prn for migraines.     Grief: On geodon and wellbutrin.    Hypothyroidism, reactive hypoglycemia: On synthroid and cytomel. Following with Dr. Maureen Roland. She is on metformin for reactive hypolgycemia.     CKD: She is on spironolactone. Following with Mis every 6 months.     Chronic constipation: She has history of sigmoidectomy for \"kinking\" in the colon. She has been told she has structural changes from the chronic constipation. She is on trulance for the last few years, she has tried a lot of meds without relief. She is taking miralax every day as well.     Clotting disorder: She has been told she has von willebrands and other times she has been told she has clotting deficiency. She takes DDAVP prior to surgeries or when she is having a bleeding episode. She has had 3 severe bleeding episodes (hysterectomy, sigmoidectomy, and then when she had her vaginal delivery).     Left knee pain: She is getting partial replacement in August with Dr. Green in [facility]. 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     She is going on a cruise soon but she has gotten seasick before and she is hoping to get rx for scopolamine.    She has a 22 year old daughter finishing up her psychology degree.     Review of systems completed and unremarkable other than what is documented in HPI.     Social history: never smoker, she has occasional alcohol on the weekends, she is working full time at the [employer], she is employed by [employer], she has been there for 3 years and she was previously at [employer]  Medical history:  Medications: qulipta, wellbutrin, vitamin D, estrace, cytomel, metformin, zofran, miralax, rizatriptan, scopolamine, spironolactone, synthroid, topiramate, " "trulance, ziprasidone.   SurgHx: sigmoidectomy, total hysterectomy (still with ovaries), left knee scope and Baker's cyst removed, She is scheduled for partial knee replacement in August, cholecystectomy, C/S, BTL  Fhx: mom  of colon cancer recently, her maternal uncle also had colon cancer,   Allergies: alendronate    Objective   /69 (BP Location: Left arm, Patient Position: Sitting, BP Cuff Size: Adult)   Pulse 59   Ht 1.626 m (5' 4\")   Wt 56.7 kg (125 lb)   BMI 21.46 kg/m²     Gen: No acute distress, alert and oriented x3, pleasant   HEENT: moist mucous membranes, b/l external auditory canals are clear of debris, TMs within normal limits, no oropharyngeal lesions, eomi, perrla   Neck: thyroid within normal limits, no lymphadenopathy   CV: RRR, normal S1/S2, no murmur   Resp: Clear to auscultation bilaterally, no wheezes or rhonchi appreciated  Abd: soft, nontender, non-distended, no guarding/rigidity, bowel sounds present  Extr: no edema, no calf tenderness  Derm: Skin is warm and dry, no rashes appreciated  Psych: mood is good, affect is congruent, good hygiene, normal speech and eye contact  Neuro: cranial nerves grossly intact, normal gait    Assessment/Plan   #Migraines:   On topiramate, qulipta, and rizatriptan for breakthrough  She has zofran prn for migraines.     #Grief:   Currently on geodon and wellbutrin    #Hypothyroidism  #Reactive hypoglycemia:   On synthroid and cytomel  Following with Dr. Maureen Roland  She is on metformin for reactive hypolgycemia    #CKD:   She is on spironolactone  Following with Mis every 6 months.     #Chronic constipation:   #h/o sigmoidectomy  On trulance and miralax    #Clotting disorder:   H/o severe bleeding with surgeries  Using DDAVP prn for bleeds or prior to surgery  Establishing with Dr. Kruse    #Left knee pain:   Planning for partial replacement in August with Dr. Green    HCM:  UTD mammogram July  No further paps due to hysterectomy  "

## 2024-11-19 ENCOUNTER — APPOINTMENT (OUTPATIENT)
Dept: PRIMARY CARE | Facility: CLINIC | Age: 49
End: 2024-11-19
Payer: COMMERCIAL

## 2024-11-19 VITALS
BODY MASS INDEX: 21.34 KG/M2 | DIASTOLIC BLOOD PRESSURE: 69 MMHG | SYSTOLIC BLOOD PRESSURE: 103 MMHG | HEIGHT: 64 IN | HEART RATE: 59 BPM | WEIGHT: 125 LBS

## 2024-11-19 DIAGNOSIS — D68.9 CLOTTING DISORDER (MULTI): ICD-10-CM

## 2024-11-19 DIAGNOSIS — T75.3XXS SEA SICKNESS, SEQUELA: Primary | ICD-10-CM

## 2024-11-19 PROCEDURE — 99214 OFFICE O/P EST MOD 30 MIN: CPT | Performed by: FAMILY MEDICINE

## 2024-11-19 PROCEDURE — 1036F TOBACCO NON-USER: CPT | Performed by: FAMILY MEDICINE

## 2024-11-19 PROCEDURE — 3008F BODY MASS INDEX DOCD: CPT | Performed by: FAMILY MEDICINE

## 2024-11-19 RX ORDER — METFORMIN HYDROCHLORIDE 500 MG/1
1 TABLET ORAL
COMMUNITY
Start: 2024-11-10

## 2024-11-19 RX ORDER — PLECANATIDE 3 MG/1
1 TABLET ORAL
COMMUNITY
Start: 2024-11-02

## 2024-11-19 RX ORDER — ZIPRASIDONE HYDROCHLORIDE 20 MG/1
CAPSULE ORAL
COMMUNITY
Start: 2024-10-30

## 2024-11-19 RX ORDER — SCOLOPAMINE TRANSDERMAL SYSTEM 1 MG/1
1 PATCH, EXTENDED RELEASE TRANSDERMAL
Qty: 10 PATCH | Refills: 0 | Status: SHIPPED | OUTPATIENT
Start: 2024-11-19 | End: 2024-12-19

## 2024-11-20 ENCOUNTER — TELEPHONE (OUTPATIENT)
Dept: PRIMARY CARE | Facility: CLINIC | Age: 49
End: 2024-11-20
Payer: COMMERCIAL

## 2024-11-20 ENCOUNTER — LAB (OUTPATIENT)
Dept: LAB | Facility: LAB | Age: 49
End: 2024-11-20
Payer: COMMERCIAL

## 2024-11-20 DIAGNOSIS — M81.0 OSTEOPOROSIS, UNSPECIFIED OSTEOPOROSIS TYPE, UNSPECIFIED PATHOLOGICAL FRACTURE PRESENCE: ICD-10-CM

## 2024-11-20 LAB
ALBUMIN SERPL BCP-MCNC: 4 G/DL (ref 3.4–5)
ALP SERPL-CCNC: 67 U/L (ref 33–110)
ALT SERPL W P-5'-P-CCNC: 32 U/L (ref 7–45)
ANION GAP SERPL CALC-SCNC: 14 MMOL/L (ref 10–20)
AST SERPL W P-5'-P-CCNC: 21 U/L (ref 9–39)
BILIRUB SERPL-MCNC: 0.3 MG/DL (ref 0–1.2)
BUN SERPL-MCNC: 16 MG/DL (ref 6–23)
CALCIUM SERPL-MCNC: 9.7 MG/DL (ref 8.6–10.3)
CHLORIDE SERPL-SCNC: 101 MMOL/L (ref 98–107)
CO2 SERPL-SCNC: 27 MMOL/L (ref 21–32)
CREAT SERPL-MCNC: 1.38 MG/DL (ref 0.5–1.05)
EGFRCR SERPLBLD CKD-EPI 2021: 48 ML/MIN/1.73M*2
GLUCOSE SERPL-MCNC: 65 MG/DL (ref 74–99)
POTASSIUM SERPL-SCNC: 3.6 MMOL/L (ref 3.5–5.3)
PROT SERPL-MCNC: 6.2 G/DL (ref 6.4–8.2)
SODIUM SERPL-SCNC: 138 MMOL/L (ref 136–145)

## 2024-11-20 PROCEDURE — 80053 COMPREHEN METABOLIC PANEL: CPT

## 2024-11-20 PROCEDURE — 36415 COLL VENOUS BLD VENIPUNCTURE: CPT

## 2024-11-21 DIAGNOSIS — E03.9 HYPOTHYROIDISM, UNSPECIFIED TYPE: ICD-10-CM

## 2024-12-05 ENCOUNTER — APPOINTMENT (OUTPATIENT)
Dept: PHYSICAL MEDICINE AND REHAB | Facility: CLINIC | Age: 49
End: 2024-12-05
Payer: COMMERCIAL

## 2024-12-05 NOTE — PATIENT INSTRUCTIONS
-Ice on and off for the next 24 hours if injection sites are sore. Do gentle range of motion exercises in each area that was injected. Try to do them every hour for about half a minute or so, in every direction that the affected part goes. No pool, bath, or hot tub today. Avoid heavy lifting for the next 2 days.    -Continue Diclofenac patch as needed  -Consider other medications in the future   -consider injections: repeat Trigger point injections as needed, botox, Ultrasound-guided intercostal nerve block, referral to Dr. Matt for erector spinae blocks  -Consider repeat thoracic MRI  -Follow-up 4-6 weeks

## 2024-12-05 NOTE — PROGRESS NOTES
[Referred by brother]    Chief complaint: mid back pain follow-up    This is a pleasant 49 y.o. right handed woman with past medical history significant for hypothyroidism, gastroparesis, migraines, Raynaud's phenomenon, Von Willebrand disease, osteopenia on boniva , presents for follow-up follow-up back pain.    She was last seen here on 10/29/2024, at which point I did repeat bilateral thoracic trigger point injections.  Initially, the first round helped 50 to 60% for about 1 month, and this time 100% for about 5 weeks. She would like repeat injections    I advised her to continue diclofenac patch as needed. She really likes the patches and feels they work well. She hasn't needed them since last time.    I advised her to continue her exercises, she is doing so.    Since last visit she underwent a left knee arthroscopy with Dr. Green on 10/30/2024 for patellofemoral disorder, Baker's cyst, OA.    She rates her pain as 4/10, previously 5/10.    Otherwise, there have been no changes to her medications or past medical history since last visit    ________________________________  9/12/2024: Bilateral thoracic trigger point injections, continue diclofenac patch as needed, consider other medications and injections, continue exercises  10/29/2024: Bilateral thoracic trigger point injections, continue diclofenac patch as needed, consider other medications and injections, continue exercises  12/5/2024: late cancel due to weather  12/11/24:  __________________________________  As a reminder:      TIMELINE OF COMPLAINT(S):    She is a very active person, lifts weights, swims, etc., and about 5 years ago, she was working out particularly heavily, and slowly started feeling pain in her mid back.  She started seeing a chiropractor, this was not helping, and eventually x-ray showed a T8 compression fracture.  She was diagnosed with osteopenia and is on Boniva at this point.  Her pain was worse with carrying weight, even a purse,  "worse with sitting, and worse in the summer when she does swimming and more bodybuilding.  She still continues to be active, and does not let this limit her.    She saw my colleague Dr. Matt and got a few rounds of TPI's and intercostal nerve blocks and these helped significantly for up to 6 months at at a time. Dr. Matt left and the patient didn't know where to go, so she was \"been getting by\" on diclofenac patches and tylenol as needed.    Note from Dr. Matt 6/25/2020 personally reviewed today.      Pain:  LOCATION-mid back pain at bra line  RADIATION- none  CONSTANT or INTERMITTENT- Constant  SEVERITY/QUANTITY- 5/10  QUALITY- aching and dull  WEAKNESS-none  NUMBNESS/TINGLING- none  ASSOCIATED WITH- no falls  EXACERBATED BY-lifting  BETTER WITH-heat  TRIED-      Anti-Inflammatories: diclofenac patch help, (has some kidney disease) previously meloxicam doesn't remember, prednisone helped      Muscle relaxants: Previously Flexeril doesn't remember      Anti-depressants: Previously Cymbalta, nortriptyline didn't help      Neuroleptics: Topamax helps migraines, previously gabapentin, Lyrica doesn't remember      LDN:    PHYSICAL THERAPY: Yes last time a few years ago, active continues with HEP regularly  CHIROPRACTIC MANIPULATION: Yes, didn't help  TENS unit: Yes, helps  ACUPUNCTURE TREATMENTS: No  DEEP TISSUE MASSAGE THERAPY: Yes, doenst help  OSTEOPATHIC MANIPULATION THERAPY: No    EMG/NCS: No    INJECTIONS:  -left sided T4 intercostal nerve block via the erector spinae plane; and trigger point injections with Dr. Matt in 2019 and 2020. These helped 100% for at least 6 months    IMAGING:    === 10/21/14 ===    MRI LUMBAR SPINE WO CONTRAST  -Sacral Tarlov cysts, mild degenerative changes L5-S1, L4-L5, L3-L4    Cervical spine MRI 3/8/2019:  Mild multilevel spondylosis    Thoracic MRI 12/13/2018:  Chronic deformity of the anterior inferior endplate T8, mild spondylosis without significant central canal or " neuroforaminal narrowing, no cord compression or abnormal signal    === 11/20/15 ===    XR SPINE LUMBAR 2 OR 3 VW    - Impression -  No evidence of fracture or subluxation.    === 08/08/24 ===    XR THORACIC SPINE 3 VIEWS    - Impression -  Mild-to-moderate multilevel discogenic degenerative changes of the  thoracic spine, more pronounced in the midthoracic spine as described  above.    Lab Results   Component Value Date    HGBA1C 5.0 05/30/2024       FUNCTIONAL HISTORY: The patient is independent in all ADLs, mobility, and driving. The patient does not use any assistive device.    SH:  Lives in: [place]  Lives with:   Occupation: Physical therapist Assistant  Tobacco: No  Alcohol: 2-3 drinks weekly  Drugs: No  __________________________________    ROS: The patient denies any bowel or bladder incontinence/accidents, night sweats, fevers, chills, recent significant weight loss. A 14 point review of systems was reviewed with the patient and is as above and otherwise negative.  ROS questionnaire positive for diarrhea, constipation, back pain     PHYSICAL EXAM    GEN - Alert, well-developed, well-nourished, no acute distress  PSYCH - Cooperative, appropriate mood and affect  HEENT - NC/AT  RESP - Non-labored respirations, equal expansion  CV - warm and well-perfused, No cyanosis or edema in extremities.  ABD- soft, ND  SKIN - No rash.    Previous:    BACK/SPINE - Symmetric posture, no erythema, edema, or swelling.  Full range of motion without provocation of pain.  There was significant tenderness and left more than right trigger points identified over the thoracic paraspinal muscles at the bra line.  No tenderness over the spinous processes.      NEURO - UE strength 5/5 -  including shoulder abduction, biceps, triceps, wrist extensors, finger flexors, interossei, and    LE strength 5/5 -  including hip flexors, knee flexors, knee extensors, ankle dorsiflexors, ankle plantar flexors, and EHL   Sensation -  intact to light touch in bilateral lower and upper extremities.   Reflexes - 2+ biceps, brachioradialis, triceps, patellar and Achilles reflexes bilaterally, Dean's negative bilaterally  GAIT - Normal base, normal stride length, non-antalgic. Able to toe walk and heel walk without difficulty.     PROCEDURE:    Lidocaine 1%- 6 cc's used, 0 cc's wasted  200mg/20mL (10mg/mL)  NDC 0461-5459-38  LOT [id number]  EXP 01/31/2026  Manuf: [facility]        Thoracic trigger point injections:    Description of the Procedure: The procedure, risks and alternative treatments were discussed with the patient. After written informed consent was obtained, the thoracic paraspinal muscles were palpated and marked. The skin was prepped three times with alcohol. Using a 27 gauge 1.5 inch needle, after negative aspiration, the trigger points in each muscle were injected with a total of 6 cc's of 1% lidocaine, spread equally into 8 sites. Twitch responses were observed in the musculature. The patient tolerated the procedure well with no immediate complications or bleeding.      Plan:   1. The patient was instructed in post-procedural care.  2. The patient was asked to apply moist heat and or ice for the next 24 hours and to perform daily gentle stretching exercises.     Physical Exam  Pulmonary:              IMPRESSION:    This is a pleasant 49 y.o. right handed woman with past medical history significant for hypothyroidism, gastroparesis, migraines, Raynaud's phenomenon, Von Willebrand disease, osteopenia on boniva, presents for follow-up of back pain.  Physical exam is reassuring for lack of neurologic compromise.  Symptoms and physical exam findings consistent with myofascial pain/tension, trigger points which she is likely constantly reactivating with her workout regimens.  Initial instigating event may have been this T8 compression fracture.    -Bilateral thoracic trigger point injections performed as above.  There were no complications  and she tolerated the procedure well.  She was provided with postprocedure instructions.  -Continue Diclofenac patch as needed  -Consider other medications in the future   -consider injections: repeat Trigger point injections as needed, Ultrasound-guided intercostal nerve block, referral to Dr. Matt for erector spinae blocks  -Consider repeat thoracic MRI  -Follow-up 4-6 weeks        The patient expressed understanding and agreement with the assessment and plan. Patient encouraged to contact us should they have any questions, concerns, or any changes in symptoms.     Thank you for allowing me to participate in the care of your patient.      ** Dictated with voice recognition software, please forgive any errors in grammar and/or spelling **

## 2024-12-11 ENCOUNTER — APPOINTMENT (OUTPATIENT)
Dept: PHYSICAL MEDICINE AND REHAB | Facility: CLINIC | Age: 49
End: 2024-12-11
Payer: COMMERCIAL

## 2024-12-11 VITALS
TEMPERATURE: 97.4 F | WEIGHT: 120 LBS | DIASTOLIC BLOOD PRESSURE: 62 MMHG | HEIGHT: 64 IN | HEART RATE: 72 BPM | SYSTOLIC BLOOD PRESSURE: 110 MMHG | OXYGEN SATURATION: 98 % | BODY MASS INDEX: 20.49 KG/M2

## 2024-12-11 DIAGNOSIS — G58.8 INTERCOSTAL NEURALGIA: ICD-10-CM

## 2024-12-11 DIAGNOSIS — M54.6 CHRONIC MIDLINE THORACIC BACK PAIN: ICD-10-CM

## 2024-12-11 DIAGNOSIS — G89.29 CHRONIC MIDLINE THORACIC BACK PAIN: ICD-10-CM

## 2024-12-11 DIAGNOSIS — M79.18 MYOFASCIAL PAIN: Primary | ICD-10-CM

## 2024-12-11 DIAGNOSIS — S29.012A STRAIN OF MID-BACK, INITIAL ENCOUNTER: ICD-10-CM

## 2024-12-11 PROCEDURE — 3008F BODY MASS INDEX DOCD: CPT | Performed by: PHYSICAL MEDICINE & REHABILITATION

## 2024-12-11 PROCEDURE — 20553 NJX 1/MLT TRIGGER POINTS 3/>: CPT | Performed by: PHYSICAL MEDICINE & REHABILITATION

## 2024-12-11 ASSESSMENT — PAIN SCALES - GENERAL: PAINLEVEL_OUTOF10: 4

## 2025-01-07 DIAGNOSIS — S29.012A STRAIN OF MID-BACK, INITIAL ENCOUNTER: ICD-10-CM

## 2025-01-07 RX ORDER — DICLOFENAC EPOLAMINE 0.01 G/1
SYSTEM TOPICAL
Qty: 30 PATCH | Refills: 3 | Status: SHIPPED | OUTPATIENT
Start: 2025-01-07

## 2025-01-08 ENCOUNTER — HOSPITAL ENCOUNTER (OUTPATIENT)
Dept: CARDIOLOGY | Facility: HOSPITAL | Age: 50
Discharge: HOME | End: 2025-01-08
Payer: COMMERCIAL

## 2025-01-08 ENCOUNTER — TELEPHONE (OUTPATIENT)
Dept: PRIMARY CARE | Facility: CLINIC | Age: 50
End: 2025-01-08

## 2025-01-08 ENCOUNTER — LAB (OUTPATIENT)
Dept: LAB | Facility: LAB | Age: 50
End: 2025-01-08
Payer: COMMERCIAL

## 2025-01-08 ENCOUNTER — OFFICE VISIT (OUTPATIENT)
Dept: PRIMARY CARE | Facility: CLINIC | Age: 50
End: 2025-01-08
Payer: COMMERCIAL

## 2025-01-08 ENCOUNTER — HOSPITAL ENCOUNTER (OUTPATIENT)
Dept: RADIOLOGY | Facility: HOSPITAL | Age: 50
Discharge: HOME | End: 2025-01-08
Payer: COMMERCIAL

## 2025-01-08 VITALS
HEIGHT: 64 IN | DIASTOLIC BLOOD PRESSURE: 68 MMHG | SYSTOLIC BLOOD PRESSURE: 102 MMHG | WEIGHT: 110 LBS | HEART RATE: 102 BPM | BODY MASS INDEX: 18.78 KG/M2

## 2025-01-08 DIAGNOSIS — E87.6 HYPOKALEMIA: Primary | ICD-10-CM

## 2025-01-08 DIAGNOSIS — R06.02 SHORTNESS OF BREATH: ICD-10-CM

## 2025-01-08 DIAGNOSIS — R53.83 OTHER FATIGUE: ICD-10-CM

## 2025-01-08 DIAGNOSIS — R09.02 HYPOXIA: ICD-10-CM

## 2025-01-08 DIAGNOSIS — J06.9 UPPER RESPIRATORY TRACT INFECTION, UNSPECIFIED TYPE: ICD-10-CM

## 2025-01-08 DIAGNOSIS — R00.0 TACHYCARDIA: ICD-10-CM

## 2025-01-08 LAB
ALBUMIN SERPL BCP-MCNC: 4.5 G/DL (ref 3.4–5)
ALP SERPL-CCNC: 40 U/L (ref 33–110)
ALT SERPL W P-5'-P-CCNC: 25 U/L (ref 7–45)
ANION GAP SERPL CALC-SCNC: 10 MMOL/L (ref 10–20)
AST SERPL W P-5'-P-CCNC: 27 U/L (ref 9–39)
ATRIAL RATE: 79 BPM
BASOPHILS # BLD AUTO: 0.06 X10*3/UL (ref 0–0.1)
BASOPHILS NFR BLD AUTO: 0.7 %
BILIRUB SERPL-MCNC: 0.4 MG/DL (ref 0–1.2)
BNP SERPL-MCNC: 8 PG/ML (ref 0–99)
BUN SERPL-MCNC: 25 MG/DL (ref 6–23)
CALCIUM SERPL-MCNC: 9.7 MG/DL (ref 8.6–10.3)
CARDIAC TROPONIN I PNL SERPL HS: 4 NG/L (ref 0–13)
CHLORIDE SERPL-SCNC: 107 MMOL/L (ref 98–107)
CO2 SERPL-SCNC: 23 MMOL/L (ref 21–32)
CREAT SERPL-MCNC: 1.15 MG/DL (ref 0.5–1.05)
D DIMER PPP FEU-MCNC: 895 NG/ML FEU
EGFRCR SERPLBLD CKD-EPI 2021: 59 ML/MIN/1.73M*2
EOSINOPHIL # BLD AUTO: 0.08 X10*3/UL (ref 0–0.7)
EOSINOPHIL NFR BLD AUTO: 0.9 %
ERYTHROCYTE [DISTWIDTH] IN BLOOD BY AUTOMATED COUNT: 12.9 % (ref 11.5–14.5)
GLUCOSE SERPL-MCNC: 92 MG/DL (ref 74–99)
HCT VFR BLD AUTO: 42.7 % (ref 36–46)
HGB BLD-MCNC: 15.4 G/DL (ref 12–16)
IMM GRANULOCYTES # BLD AUTO: 0.03 X10*3/UL (ref 0–0.7)
IMM GRANULOCYTES NFR BLD AUTO: 0.3 % (ref 0–0.9)
LYMPHOCYTES # BLD AUTO: 3.2 X10*3/UL (ref 1.2–4.8)
LYMPHOCYTES NFR BLD AUTO: 35.2 %
MAGNESIUM SERPL-MCNC: 2.37 MG/DL (ref 1.6–2.4)
MCH RBC QN AUTO: 32.6 PG (ref 26–34)
MCHC RBC AUTO-ENTMCNC: 36.1 G/DL (ref 32–36)
MCV RBC AUTO: 90 FL (ref 80–100)
MONOCYTES # BLD AUTO: 0.62 X10*3/UL (ref 0.1–1)
MONOCYTES NFR BLD AUTO: 6.8 %
NEUTROPHILS # BLD AUTO: 5.09 X10*3/UL (ref 1.2–7.7)
NEUTROPHILS NFR BLD AUTO: 56.1 %
NRBC BLD-RTO: 0 /100 WBCS (ref 0–0)
P AXIS: 74 DEGREES
P OFFSET: 201 MS
P ONSET: 151 MS
PLATELET # BLD AUTO: 459 X10*3/UL (ref 150–450)
POC RAPID INFLUENZA A: NEGATIVE
POC RAPID INFLUENZA B: NEGATIVE
POC SARS-COV-2 AG BINAX: NORMAL
POTASSIUM SERPL-SCNC: 2.8 MMOL/L (ref 3.5–5.3)
PR INTERVAL: 148 MS
PROT SERPL-MCNC: 6.9 G/DL (ref 6.4–8.2)
Q ONSET: 225 MS
QRS COUNT: 13 BEATS
QRS DURATION: 92 MS
QT INTERVAL: 390 MS
QTC CALCULATION(BAZETT): 447 MS
QTC FREDERICIA: 427 MS
R AXIS: 98 DEGREES
RBC # BLD AUTO: 4.73 X10*6/UL (ref 4–5.2)
SODIUM SERPL-SCNC: 137 MMOL/L (ref 136–145)
T AXIS: 9 DEGREES
T OFFSET: 420 MS
TSH SERPL-ACNC: 0.58 MIU/L (ref 0.44–3.98)
VENTRICULAR RATE: 79 BPM
WBC # BLD AUTO: 9.1 X10*3/UL (ref 4.4–11.3)

## 2025-01-08 PROCEDURE — 71046 X-RAY EXAM CHEST 2 VIEWS: CPT | Performed by: RADIOLOGY

## 2025-01-08 PROCEDURE — 71046 X-RAY EXAM CHEST 2 VIEWS: CPT

## 2025-01-08 PROCEDURE — 93005 ELECTROCARDIOGRAM TRACING: CPT

## 2025-01-08 PROCEDURE — 99214 OFFICE O/P EST MOD 30 MIN: CPT | Performed by: FAMILY MEDICINE

## 2025-01-08 PROCEDURE — 3008F BODY MASS INDEX DOCD: CPT | Performed by: FAMILY MEDICINE

## 2025-01-08 PROCEDURE — 87811 SARS-COV-2 COVID19 W/OPTIC: CPT | Performed by: FAMILY MEDICINE

## 2025-01-08 PROCEDURE — 1036F TOBACCO NON-USER: CPT | Performed by: FAMILY MEDICINE

## 2025-01-08 PROCEDURE — 87804 INFLUENZA ASSAY W/OPTIC: CPT | Performed by: FAMILY MEDICINE

## 2025-01-08 RX ORDER — POTASSIUM CHLORIDE 750 MG/1
20 TABLET, EXTENDED RELEASE ORAL
COMMUNITY
Start: 2024-12-17 | End: 2025-03-17

## 2025-01-08 RX ORDER — ACARBOSE 25 MG/1
1 TABLET ORAL
COMMUNITY
Start: 2024-12-20

## 2025-01-08 RX ORDER — TENAPANOR HYDROCHLORIDE 53.2 MG/1
TABLET ORAL
COMMUNITY
Start: 2024-12-20

## 2025-01-08 RX ORDER — SEMAGLUTIDE 0.68 MG/ML
0.25 INJECTION, SOLUTION SUBCUTANEOUS
COMMUNITY
Start: 2024-12-23

## 2025-01-08 RX ORDER — RIMEGEPANT SULFATE 75 MG/75MG
75 TABLET, ORALLY DISINTEGRATING ORAL
COMMUNITY
Start: 2024-12-17 | End: 2025-12-17

## 2025-01-08 RX ORDER — VILAZODONE HYDROCHLORIDE 40 MG/1
TABLET ORAL
COMMUNITY
Start: 2024-12-30

## 2025-01-08 RX ORDER — POTASSIUM CHLORIDE 20 MEQ/1
20 TABLET, EXTENDED RELEASE ORAL 2 TIMES DAILY
Qty: 60 TABLET | Refills: 11 | Status: SHIPPED | OUTPATIENT
Start: 2025-01-08 | End: 2026-01-08

## 2025-01-08 ASSESSMENT — ENCOUNTER SYMPTOMS
SYNCOPE: 0
HEMOPTYSIS: 0
PND: 0
HEADACHES: 0
FEVER: 0
ORTHOPNEA: 0
VOMITING: 0
RHINORRHEA: 0
SWOLLEN GLANDS: 0
WHEEZING: 0
NECK PAIN: 0
SORE THROAT: 0
CLAUDICATION: 1
SPUTUM PRODUCTION: 0
SHORTNESS OF BREATH: 1
LEG PAIN: 0
ABDOMINAL PAIN: 0

## 2025-01-08 NOTE — TELEPHONE ENCOUNTER
I spoke with Sienna in regards to her D-dimer and potassium results today per Dr. Yepez. We discussed options of ghoing through ED for IV potassium vs increased dose of oral potassium BID x1 week then daily long-term. Sienna opted to take increased dose of oral potassium.

## 2025-01-08 NOTE — PROGRESS NOTES
"Subjective   Patient ID: Sienna Acuña is a 49 y.o. female who presents for Sick Visit (SOB, weak, tired since Monday; no cough, fever, or N/V/D; she was on a cruise last week and started feeling SOB then and it got worse when she got home).    URI: She just got back from a cruise, she is feeling short of breath and weak. Tired. Slight tachycardia. No fever, cough, or respiratory symptoms. Pulse ox today is 99. She has lost about 10lb in the last month. No sore throat/rhinorrhea. No ear symptoms. She had nausea but she felt that was from being seasick, this has resolved totally. 2 episodes of vomiting. No abdominal pain. No urinary symptoms. Legs literally feel weak and like they are going to give out. She was told her potassium was low prior to her trip but they came out without dissolving. No leg swelling.     Objective   /68 (BP Location: Left arm, Patient Position: Sitting, BP Cuff Size: Adult)   Pulse 102   Ht 1.626 m (5' 4\")   Wt 49.9 kg (110 lb)   BMI 18.88 kg/m²     Gen: No acute distress, alert and oriented x3, pleasant   HEENT: moist mucous membranes, b/l external auditory canals are clear of debris, TMs within normal limits, no oropharyngeal lesions, eomi, perrla   Neck: thyroid within normal limits, no lymphadenopathy   CV: RRR, normal S1/S2, no murmur   Resp: Clear to auscultation bilaterally, no wheezes or rhonchi appreciated  Abd: soft, nontender, non-distended, no guarding/rigidity, bowel sounds present  Extr: no edema, no calf tenderness  Derm: Skin is warm and dry, no rashes appreciated  Psych: mood is good, affect is congruent, good hygiene, normal speech and eye contact  Neuro: cranial nerves grossly intact, normal gait    Assessment/Plan     #Shortness of breath  Check labs, EKG, and CXR  Possible pneumonia  Work note completed    Answers submitted by the patient for this visit:  Shortness of Breath Questionnaire (Submitted on 1/8/2025)  Chief Complaint: Shortness of " breath  Chronicity: new  Onset: in the past 7 days  Frequency: constantly  Progression since onset: gradually worsening  Episode duration: 2 Minutes  abdominal pain: No  chest pain: Yes  hemoptysis: No  sputum production: No  PND: No  ear pain: No  syncope: No  fever: No  headaches: No  claudication: Yes  leg pain: No  leg swelling: No  neck pain: No  rash: No  rhinorrhea: No  orthopnea: No  sore throat: No  coryza: No  swollen glands: No  vomiting: No  wheezing: No  Aggravating factors: exercise, any activity

## 2025-01-08 NOTE — LETTER
January 8, 2025     Patient: Sienna Acuña   YOB: 1975   Date of Visit: 1/8/2025       To Whom It May Concern:    Sienna Acuña was seen in my clinic on 1/8/2025. Please excuse Sienna for her absence from work from 1/8/2025 through 1/12/2025. She may return to work on 1/13/2025.    If you have any questions or concerns, please don't hesitate to call.         Sincerely,         Carrol Yepez, DO

## 2025-01-08 NOTE — PROGRESS NOTES
This is a   here to establish care:        Review of systems completed and unremarkable other than what is documented in HPI.    Social history:  Medical history:  Medications:  SurgHx:  Fhx:  Allergies:  
[[email or web address]_ALL_OB_FT:[id number]],[NS_DeliveryAssist1_OBGYN_ALL_OB_FT:[id number]],[NS_DeliveryRN_OBGYN_ALL_OB_FT:[id number]]

## 2025-01-09 ENCOUNTER — APPOINTMENT (OUTPATIENT)
Dept: NEPHROLOGY | Facility: CLINIC | Age: 50
End: 2025-01-09
Payer: COMMERCIAL

## 2025-01-10 ENCOUNTER — HOSPITAL ENCOUNTER (EMERGENCY)
Facility: HOSPITAL | Age: 50
Discharge: HOME | End: 2025-01-10
Attending: EMERGENCY MEDICINE
Payer: COMMERCIAL

## 2025-01-10 ENCOUNTER — TELEPHONE (OUTPATIENT)
Dept: PRIMARY CARE | Facility: CLINIC | Age: 50
End: 2025-01-10

## 2025-01-10 ENCOUNTER — LAB (OUTPATIENT)
Dept: LAB | Facility: LAB | Age: 50
End: 2025-01-10
Payer: COMMERCIAL

## 2025-01-10 VITALS
DIASTOLIC BLOOD PRESSURE: 73 MMHG | HEART RATE: 74 BPM | TEMPERATURE: 97 F | BODY MASS INDEX: 18.27 KG/M2 | RESPIRATION RATE: 16 BRPM | HEIGHT: 64 IN | SYSTOLIC BLOOD PRESSURE: 132 MMHG | WEIGHT: 107 LBS | OXYGEN SATURATION: 99 %

## 2025-01-10 DIAGNOSIS — R53.1 WEAKNESS: ICD-10-CM

## 2025-01-10 DIAGNOSIS — E87.6 HYPOKALEMIA: ICD-10-CM

## 2025-01-10 DIAGNOSIS — R53.83 FATIGUE, UNSPECIFIED TYPE: Primary | ICD-10-CM

## 2025-01-10 LAB
ANION GAP SERPL CALC-SCNC: 8 MMOL/L (ref 10–20)
APPEARANCE UR: CLEAR
BILIRUB UR STRIP.AUTO-MCNC: NEGATIVE MG/DL
BUN SERPL-MCNC: 19 MG/DL (ref 6–23)
CALCIUM SERPL-MCNC: 8.9 MG/DL (ref 8.6–10.3)
CHLORIDE SERPL-SCNC: 109 MMOL/L (ref 98–107)
CO2 SERPL-SCNC: 21 MMOL/L (ref 21–32)
COLOR UR: YELLOW
CREAT SERPL-MCNC: 1.13 MG/DL (ref 0.5–1.05)
EGFRCR SERPLBLD CKD-EPI 2021: 61 ML/MIN/1.73M*2
GLUCOSE SERPL-MCNC: 93 MG/DL (ref 74–99)
GLUCOSE UR STRIP.AUTO-MCNC: NEGATIVE MG/DL
KETONES UR STRIP.AUTO-MCNC: NEGATIVE MG/DL
LEUKOCYTE ESTERASE UR QL STRIP.AUTO: NEGATIVE
NITRITE UR QL STRIP.AUTO: NEGATIVE
PH UR STRIP.AUTO: 6 [PH]
POTASSIUM SERPL-SCNC: 3 MMOL/L (ref 3.5–5.3)
PROT UR STRIP.AUTO-MCNC: NEGATIVE MG/DL
RBC # UR STRIP.AUTO: NEGATIVE /UL
SODIUM SERPL-SCNC: 135 MMOL/L (ref 136–145)
SP GR UR STRIP.AUTO: 1.02
UROBILINOGEN UR STRIP.AUTO-MCNC: <2 MG/DL

## 2025-01-10 PROCEDURE — 2500000004 HC RX 250 GENERAL PHARMACY W/ HCPCS (ALT 636 FOR OP/ED)

## 2025-01-10 PROCEDURE — 2500000002 HC RX 250 W HCPCS SELF ADMINISTERED DRUGS (ALT 637 FOR MEDICARE OP, ALT 636 FOR OP/ED)

## 2025-01-10 PROCEDURE — 81003 URINALYSIS AUTO W/O SCOPE: CPT | Performed by: EMERGENCY MEDICINE

## 2025-01-10 PROCEDURE — 96365 THER/PROPH/DIAG IV INF INIT: CPT

## 2025-01-10 PROCEDURE — 96366 THER/PROPH/DIAG IV INF ADDON: CPT

## 2025-01-10 PROCEDURE — 99284 EMERGENCY DEPT VISIT MOD MDM: CPT | Performed by: EMERGENCY MEDICINE

## 2025-01-10 PROCEDURE — 80048 BASIC METABOLIC PNL TOTAL CA: CPT

## 2025-01-10 RX ORDER — POTASSIUM CHLORIDE 750 MG/1
10 TABLET, FILM COATED, EXTENDED RELEASE ORAL ONCE
Status: COMPLETED | OUTPATIENT
Start: 2025-01-10 | End: 2025-01-10

## 2025-01-10 RX ORDER — POTASSIUM CHLORIDE 14.9 MG/ML
20 INJECTION INTRAVENOUS
Status: DISCONTINUED | OUTPATIENT
Start: 2025-01-10 | End: 2025-01-10

## 2025-01-10 RX ORDER — POTASSIUM CHLORIDE 750 MG/1
TABLET, FILM COATED, EXTENDED RELEASE ORAL
Status: COMPLETED
Start: 2025-01-10 | End: 2025-01-10

## 2025-01-10 RX ORDER — POTASSIUM CHLORIDE 14.9 MG/ML
INJECTION INTRAVENOUS
Status: COMPLETED
Start: 2025-01-10 | End: 2025-01-10

## 2025-01-10 RX ADMIN — POTASSIUM CHLORIDE 20 MEQ: 14.9 INJECTION, SOLUTION INTRAVENOUS at 12:39

## 2025-01-10 RX ADMIN — POTASSIUM CHLORIDE 10 MEQ: 750 TABLET, FILM COATED, EXTENDED RELEASE ORAL at 12:40

## 2025-01-10 RX ADMIN — POTASSIUM CHLORIDE 20 MEQ: 14.9 INJECTION INTRAVENOUS at 12:39

## 2025-01-10 ASSESSMENT — COLUMBIA-SUICIDE SEVERITY RATING SCALE - C-SSRS
6. HAVE YOU EVER DONE ANYTHING, STARTED TO DO ANYTHING, OR PREPARED TO DO ANYTHING TO END YOUR LIFE?: NO
2. HAVE YOU ACTUALLY HAD ANY THOUGHTS OF KILLING YOURSELF?: NO
1. IN THE PAST MONTH, HAVE YOU WISHED YOU WERE DEAD OR WISHED YOU COULD GO TO SLEEP AND NOT WAKE UP?: NO

## 2025-01-10 ASSESSMENT — PAIN SCALES - GENERAL
PAINLEVEL_OUTOF10: 0 - NO PAIN
PAINLEVEL_OUTOF10: 0 - NO PAIN

## 2025-01-10 ASSESSMENT — PAIN - FUNCTIONAL ASSESSMENT: PAIN_FUNCTIONAL_ASSESSMENT: 0-10

## 2025-01-10 NOTE — DISCHARGE INSTRUCTIONS
Continue current medications.    Follow-up with Dr. Yepez next week to recheck your potassium levels.    Encourage oral fluids and eat 3 meals per day.    Return for worsening symptoms or concerns.

## 2025-01-10 NOTE — ED PROVIDER NOTES
[facility]   ED  Provider Note  1/10/2025 12:18 PM  AC08/AC08      Chief Complaint   Patient presents with    Fatigue     shortness    Shortness of Breath     Since return from cruise; outpatient workup completed by Dr. Yepez.        History of Present Illness:   Sienna Acuña is a 49 y.o. female presenting to the ED for fatigue and shortness of breath, beginning after returning from her cruise..  The complaint has been persistent, moderate in severity, and worsened by nothing.  Patient denies chest pain or cough.  She denies fever or chills.  She has no urinary frequency or burning.  Potassium was 2.8 has been taking oral supplements for the past few days.  Repeat testing the office by Dr. Yepez showed of level 3.0.  She was sent to the ER for potassium replacement hopes she would feel stronger and less short of breath.  Her pulse ox is 100% on room air.  She has no history of DVT or PE.  Her Wells score is 0.  She denies any focal weakness or numbness.  She does have a history of generalized anxiety.      Review of Systems:   Pertinent positives and review of systems as noted above.  Remaining 10 review of systems is negative or noncontributory to today's episode of care.  Review of Systems       --------------------------------------------- PAST HISTORY ---------------------------------------------  Past Medical History:   Past Medical History:   Diagnosis Date    Abnormal CT of thoracic spine 03/31/2017    Abnormal platelet function test (Multi) 01/28/2014    Anemia     Anxiety     Baker's cyst     Baker's cyst of knee, left     Blood in stool 04/16/2015    Colonoscopy & EGD tomorrow  Will speak with Q3 regarding procedures tomorrow   Bowel prep tonight      Bright red blood per rectum 08/22/2017    Chilblain lupus     Chronic kidney disease     Clotting disorder (Multi)     Depression     Disease of thyroid gland     Gastritis 01/2022    Hematuria 03/08/2016    Hypothyroidism     IBS (irritable bowel  Manda Agent   MRN: [id number]    Department:  THE [facility] in [place]   Date of Visit:  11/6/2018           Disclosure     Insurance plans vary and the physician(s) referred by the ER may not be covered by your plan.  Please contact y syndrome)     Infection due to fungus 2024    Inflammatory bowel disease     Irritation of vulva 2024    Left thigh pain 2014    Migraines     Muscle strain of gluteal region 2014    OAB (overactive bladder)     Osteopenia     Overactive bladder     PONV (postoperative nausea and vomiting)     Positive JENNIFER (antinuclear antibody)     Pyelonephritis 2024      Swelling of extremity 2024        Past Surgical History:   Past Surgical History:   Procedure Laterality Date    BREAST SURGERY Bilateral     AUGMENTATION     SECTION, CLASSIC  2013     Section     SECTION, LOW TRANSVERSE  10-7-2003    CHOLECYSTECTOMY      COLON SURGERY      COLONOSCOPY      CT ABDOMEN PELVIS ANGIOGRAM W AND/OR WO IV CONTRAST  2014    CT ABDOMEN PELVIS ANGIOGRAM W AND/OR WO IV CONTRAST 2014 GEN ANCILLARY LEGACY    ESOPHAGOGASTRODUODENOSCOPY  2022    HYSTERECTOMY  04/10/2013    Hysterectomy    KNEE ARTHROPLASTY  2024    KNEE ARTHROSCOPY W/ DEBRIDEMENT Left 10/30/2024    Left knee scope AJD, debridement of bakers cyst via posteromedial portal    KNEE ARTHROSCOPY, MEDIAL PATELLO FEMORAL LIGAMENT REPAIR Left     SIGMOIDECTOMY      SIGMOIDECTOMY      TUBAL LIGATION  2013    Tubal Ligation        Social History:   Social History     Social History Narrative    Not on file        Family History: family history includes Arthritis in her maternal grandmother; Blood Disorder in her mother; Cancer in her mother and another family member; Clotting disorder in her mother; Colon cancer in her mother; Diabetes in her brother, paternal grandfather, and other family members; Endometriosis in some other family members; Kidney disease in her maternal grandmother; Stroke in her brother; thyroid disorder in some other family members. Unless otherwise noted, family history is non contributory    Patient's Medications   New Prescriptions    No medications on file  tell this physician (or your personal doctor if your instructions are to return to your personal doctor) about any new or lasting problems. The primary care or specialist physician will see patients referred from the [facility] Emergency Department.  Sherry Eubanks   Previous Medications    ACARBOSE (PRECOSE) 25 MG TABLET    Take 1 tablet (25 mg) by mouth 3 times a day.    ATOGEPANT (QULIPTA ORAL)    Take by mouth.    BUPROPION XL (WELLBUTRIN XL) 300 MG 24 HR TABLET    Take 1 tablet (300 mg) by mouth once daily in the morning.    CHOLECALCIFEROL (VITAMIN D-3) 50,000 UNIT CAPSULE    Take 1 capsule (50,000 Units) by mouth every 30 (thirty) days.    DICLOFENAC EPOLAMINE 1.3 % PATCH    APPLY 1  TOPICALLY TO SKIN ONCE DAILY    ESTRADIOL (ESTRACE) 0.01 % (0.1 MG/GRAM) VAGINAL CREAM    Apply pea-sized amount of cream to affected area twice a week.    IBSRELA 50 MG TABLET TABLET        LIOTHYRONINE (CYTOMEL) 5 MCG TABLET    Take 1 tablet (5 mcg) by mouth 2 times a day.    NURTEC ODT 75 MG TABLET,DISINTEGRATING    Dissolve 1 tablet (75 mg) in the mouth.    ONDANSETRON (ZOFRAN) 4 MG TABLET    Take 1 tablet (4 mg) by mouth every 8 hours if needed for nausea or vomiting.    OZEMPIC 0.25 MG OR 0.5 MG (2 MG/3 ML) PEN INJECTOR    Inject 0.25 mg under the skin 1 (one) time per week.    POLYETHYLENE GLYCOL (MIRALAX) 17 GRAM/DOSE POWDER    Take by mouth.    POTASSIUM CHLORIDE CR (KLOR-CON M20) 20 MEQ ER TABLET    Take 1 tablet (20 mEq) by mouth 2 times a day. Do not crush or chew.    POTASSIUM CHLORIDE CR 10 MEQ ER TABLET    Take 2 tablets (20 mEq) by mouth once daily.    RIZATRIPTAN (MAXALT) 10 MG TABLET    TAKE 1 TABLET BY MOUTH AS NEEDED AT ONSET OF HEADACHE.MAY REPEAT AFTER 2 HOURS.DO NOT EXCEED 3 TABLETS (30MG) PER DAY    SYNTHROID 88 MCG TABLET    Take 100 mcg by mouth early in the morning..    TOPIRAMATE (TOPAMAX) 100 MG TABLET    Take by mouth.    VILAZODONE (VIIBRYD) 40 MG TABLET        ZIPRASIDONE (GEODON) 20 MG CAPSULE       Modified Medications    No medications on file   Discontinued Medications    No medications on file      The patient’s home medications have been reviewed.    Allergies: Alendronate    -------------------------------------------------- RESULTS  "-------------------------------------------------  All laboratory and radiology results have been personally reviewed by myself   LABS:  Labs Reviewed   URINALYSIS WITH REFLEX MICROSCOPIC - Normal       Result Value    Color, Urine Yellow      Appearance, Urine Clear      Specific Gravity, Urine 1.019      pH, Urine 6.0      Protein, Urine NEGATIVE      Glucose, Urine NEGATIVE      Blood, Urine NEGATIVE      Ketones, Urine NEGATIVE      Bilirubin, Urine NEGATIVE      Urobilinogen, Urine <2.0      Nitrite, Urine NEGATIVE      Leukocyte Esterase, Urine NEGATIVE           RADIOLOGY:  Interpreted by Radiologist.  No orders to display       Encounter Date: 01/08/25   ECG 12 lead (Ancillary Performed)   Result Value    Ventricular Rate 79    Atrial Rate 79    NC Interval 148    QRS Duration 92    QT Interval 390    QTC Calculation(Bazett) 447    P Axis 74    R Axis 98    T Axis 9    QRS Count 13    Q Onset 225    P Onset 151    P Offset 201    T Offset 420    QTC Fredericia 427    Narrative    Normal sinus rhythm  Rightward axis  Septal infarct , age undetermined  Abnormal ECG  No previous ECGs available     ------------------------- NURSING NOTES AND VITALS REVIEWED ---------------------------   The nursing notes within the ED encounter and vital signs as below have been reviewed.   /75 (BP Location: Right arm, Patient Position: Sitting)   Pulse 75   Temp 36.1 °C (97 °F) (Temporal)   Resp 18   Ht 1.626 m (5' 4\")   Wt 48.5 kg (107 lb)   SpO2 96%   BMI 18.37 kg/m²   Oxygen Saturation Interpretation: Normal      ---------------------------------------------------PHYSICAL EXAM--------------------------------------  Physical Exam   Constitutional/General: Alert,  well appearing, non toxic in NAD  Head: Normocephalic and atraumatic  Eyes: PERRL, EOMI, conjunctiva normal, sclera non icteric  Mouth: Oropharynx clear, handling secretions, no trismus, no asymmetry of the posterior oropharynx or uvular edema  Neck: " Supple, full ROM, non tender to palpation in the midline, no stridor, no crepitus, no meningeal signs  Respiratory: Lungs clear to auscultation bilaterally, no wheezes, rales, or rhonchi. Not in respiratory distress  Cardiovascular:  Regular rate. Regular rhythm. No murmurs, gallops, or rubs. 2+ distal pulses  Chest: No chest wall tenderness  GI:  Abdomen Soft, Non tender, Non distended.  +BS. No organomegaly, no palpable masses,  No rebound, guarding, or rigidity.   Musculoskeletal: Moves all extremities x 4. Warm and well perfused, no clubbing, cyanosis, or edema. Capillary refill <3 seconds  Integument: skin warm and dry. No rashes.   Lymphatic: no lymphadenopathy noted  Neurologic: No focal deficits, symmetric strength 5/5 in the upper and lower extremities bilaterally  Psychiatric: Normal Affect    Procedures    ------------------------------ ED COURSE/MEDICAL DECISION MAKING----------------------  Diagnoses as of 01/10/25 1436   Fatigue, unspecified type   Weakness   Hypokalemia      Wells Score 0, <1.3% chance of PE.  Chest x-ray done as an outpatient 2 days ago was negative.  Her EKG from 2 days ago showed a normal sinus rhythm with right axis deviation loss of anterior R wave is noted, no acute ST elevations were found.  Her urinalysis today shows no sign of acute disease process.  Her GFR was 68 which is better than it has been in the past.  Her potassium today was 3.0 and she has been supplemented with oral and IV potassium prior to discharge.  She is continue to take her potassium as prescribed by Dr. Yepez.  I have asked her to work on increasing her diet and fluid intake.  She is return to Dr. Yepez's office for recheck in 1 week.    I have asked return to the ER for worsening symptoms or concerns.      Medical Decision Making:   Discharged to home  Diagnoses as of 01/10/25 1436   Fatigue, unspecified type   Weakness   Hypokalemia      Counseling:   The emergency provider has spoken with the patient  and discussed today’s results, in addition to providing specific details for the plan of care and counseling regarding the diagnosis and prognosis.  Questions are answered at this time and they are agreeable with the plan.      --------------------------------- IMPRESSION AND DISPOSITION ---------------------------------        IMPRESSION  1. Fatigue, unspecified type    2. Weakness    3. Hypokalemia        DISPOSITION  Disposition: Discharge to home  Patient condition is fair      Billing Provider Critical Care Time: 0 minutes     Kanu Bustos MD  01/10/25 [id number]

## 2025-01-10 NOTE — ED TRIAGE NOTES
Neurosurgery Follow-Up      Patient ID: Bernabe Yap is a 37 year old male.    Chief Complaint   Patient presents with   • Wound Check     HPI    HPI:   Bernabe Yap is seen today for wound check. He is 2 weeks s/p repeat L5-S1 microdiscectomy and durotomy repair done 9/21/22 by Dr. Sanjeev Webb. He has a history of  L5-S1 herniated disc s/p L5-S1 microdiscectomy and durotomy repair 9/7/22 with Dr. Webb. He presented to [facility] ED 9/19/22 for left lower extremity weakness, back pain, and positional headache for the past week. Pt denied urinary/bowel incontinence, saddle paraesthesia, and no gross motor deficients were noted on examination. He was evaluated by neurosurgery and repeat MRI showed reoccurrence of L5-S1 disc herniation and cerebral spinal fluid leak. He was discharged home on 9/26/22    He is accompanied by a friend who assists in translation. He is not taking any pain medication. He has no significant complaints of pain. He states his right calf \"feels different\" when he sits too long. He has a sensation of a ball moving in the arch of his left foot when he walks. He denies weakness in his lower extremities. He denies radicular pain into his lower extremities. He denies bowel or bladder incontinence.     Incision is clean, dry and intact. No erythema or drainage. There is a small area of scabbing at the distal portion of the incision. Sutures were removed with out complication    Review of Systems   Constitutional: Negative for chills and fever.   Respiratory: Negative for shortness of breath.    Cardiovascular: Negative for chest pain.   Genitourinary: Negative.    Musculoskeletal: Negative for back pain and gait problem.   Neurological: Positive for numbness. Negative for weakness.   Psychiatric/Behavioral: The patient is not nervous/anxious.    All other systems reviewed and are negative.        Vitals:    10/05/22 1126   BP: 118/75   Temp: 98.6 °F (37 °C)       NAD, pleasant and cooperative.  H NCAT  Pt to ED c/o ongoing weakness, fatigue, and shortness of breath that began on her cruise last week. Patient was seen by Dr. Yepez today, her PCP, and has received outpatient workup of symptoms including CXR, bloodwork including ddimer, and viral swabs. Pt was found to intially have potassium of 2.8 by Dr. Yepez and placed on potassium PO 20 meq daily. Patient had follow up bloodwork and appointment today where her potassium has improved to 3.0, but her symptoms are persistent. Pt sent to this ED for evaluation. VSS on arrival.         Neurologic Exam:    MENTAL STATUS  He is awake, alert, and oriented to person, place and time.  He follows simple and complex commands.  Language intact to naming, repetition, fluency and comprehension.  Speech intact.  Normal attention.         MOTOR EXAM      LOWER EXTREMITIES:  MUSCLE Power  0-5/5    Right Left   Hip Flex 5 5   Hip Ex 5 5   Knee Flexion 5 5   Knee Ex 5 5   DF -Foot  5 5   PF - Foot 5 5     GAIT:  Normal gait and stance.           IMPRESSION:  (Z98.890) S/P lumbar microdiscectomy  (primary encounter diagnosis)      RECOMMENDATIONS:     The patient and I discussed his history, exam and prior work up.  We discussed the diagnosis and treatment options.    Bernabe Yap is a 37 year old male presenting for wound check. He is 2 weeks s/p repeat L5-S1 microdiscectomy and durotomy repair done 9/21/22 by Dr. Sanjeev Webb. He is overall doing well post operatively. He has no complaints of pain. He does not currently require pain medication. Lumbar sutures removed without complication. He was advised to continue his post op BLT restrictions. He will return for his 6 week post op appointment with Dr. Webb. He is aware to contact our office with any questions or concerns    All questions were answered.    Thank you for referring this pleasant patient to the neurosurgical clinic.  If I can be any further assistance please do not hesitate to contact me.    Sincerely,    BRIDGET Santacruz  Neurosurgery   [facility]      Greater than 50% of the visit was spent counseling regarding above issues; total time spent 16 minutes.

## 2025-01-10 NOTE — TELEPHONE ENCOUNTER
Unfortunately I believe this means she will need to go through the ER. I will call and ask if they can push her through if she is willing to go in but I can't guarantee a timeline.

## 2025-01-13 ENCOUNTER — TELEPHONE (OUTPATIENT)
Dept: PRIMARY CARE | Facility: CLINIC | Age: 50
End: 2025-01-13
Payer: COMMERCIAL

## 2025-01-13 NOTE — TELEPHONE ENCOUNTER
Pt states she is feeling better after her infusion but her HR is still really high, 126 this morning. Is this something that she should expect from now on. She is coming in next Monday to check in.

## 2025-01-13 NOTE — TELEPHONE ENCOUNTER
I think the tachycardia will go away when the low potassium resolves. I am ordering fresh labs for her next check but she could get these done at any time if she is concerned.

## 2025-01-13 NOTE — PROGRESS NOTES
[Referred by brother]    Chief complaint: mid back pain follow-up    This is a pleasant 49 y.o. right handed woman with past medical history significant for hypothyroidism, gastroparesis, migraines, Raynaud's phenomenon, Von Willebrand disease, osteopenia on boniva , presents for follow-up follow-up back pain.    She was last seen here on 12/11/2024, at which point I did repeat bilateral thoracic trigger point injections.  Initially, the first rounds helped 60%-100% for about  4-6 weeks and this time    She would like repeat injections    I advised her to continue diclofenac patch as needed. She really likes the patches and feels they work well. She hasn't needed them since last time.    I advised her to continue her exercises, she is doing so.      She rates her pain as 4/10, previously 5/10.    Otherwise, there have been no changes to her medications or past medical history since last visit    ________________________________  9/12/2024: Bilateral thoracic trigger point injections, continue diclofenac patch as needed, consider other medications and injections, continue exercises  10/29/2024: Bilateral thoracic trigger point injections, continue diclofenac patch as needed, consider other medications and injections, continue exercises  12/5/2024: late cancel due to weather  12/11/24: Bilateral thoracic trigger point injections, same as above   1/14/2025:  __________________________________  As a reminder:      TIMELINE OF COMPLAINT(S):    She is a very active person, lifts weights, swims, etc., and about 5 years ago, she was working out particularly heavily, and slowly started feeling pain in her mid back.  She started seeing a chiropractor, this was not helping, and eventually x-ray showed a T8 compression fracture.  She was diagnosed with osteopenia and is on Boniva at this point.  Her pain was worse with carrying weight, even a purse, worse with sitting, and worse in the summer when she does swimming and more  "bodybuilding.  She still continues to be active, and does not let this limit her.    She saw my colleague Dr. Matt and got a few rounds of TPI's and intercostal nerve blocks and these helped significantly for up to 6 months at at a time. Dr. Matt left and the patient didn't know where to go, so she was \"been getting by\" on diclofenac patches and tylenol as needed.    Note from Dr. Matt 6/25/2020 personally reviewed today.      Since last visit she underwent a left knee arthroscopy with Dr. Green on 10/30/2024 for patellofemoral disorder, Baker's cyst, OA.    Pain:  LOCATION-mid back pain at bra line  RADIATION- none  CONSTANT or INTERMITTENT- Constant  SEVERITY/QUANTITY- 5/10  QUALITY- aching and dull  WEAKNESS-none  NUMBNESS/TINGLING- none  ASSOCIATED WITH- no falls  EXACERBATED BY-lifting  BETTER WITH-heat  TRIED-      Anti-Inflammatories: diclofenac patch help, (has some kidney disease) previously meloxicam doesn't remember, prednisone helped      Muscle relaxants: Previously Flexeril doesn't remember      Anti-depressants: Previously Cymbalta, nortriptyline didn't help      Neuroleptics: Topamax helps migraines, previously gabapentin, Lyrica doesn't remember      LDN:    PHYSICAL THERAPY: Yes last time a few years ago, active continues with HEP regularly  CHIROPRACTIC MANIPULATION: Yes, didn't help  TENS unit: Yes, helps  ACUPUNCTURE TREATMENTS: No  DEEP TISSUE MASSAGE THERAPY: Yes, doenst help  OSTEOPATHIC MANIPULATION THERAPY: No    EMG/NCS: No    INJECTIONS:  -left sided T4 intercostal nerve block via the erector spinae plane; and trigger point injections with Dr. Matt in 2019 and 2020. These helped 100% for at least 6 months    IMAGING:    === 10/21/14 ===    MRI LUMBAR SPINE WO CONTRAST  -Sacral Tarlov cysts, mild degenerative changes L5-S1, L4-L5, L3-L4    Cervical spine MRI 3/8/2019:  Mild multilevel spondylosis    Thoracic MRI 12/13/2018:  Chronic deformity of the anterior inferior endplate T8, mild " spondylosis without significant central canal or neuroforaminal narrowing, no cord compression or abnormal signal    === 11/20/15 ===    XR SPINE LUMBAR 2 OR 3 VW    - Impression -  No evidence of fracture or subluxation.    === 08/08/24 ===    XR THORACIC SPINE 3 VIEWS    - Impression -  Mild-to-moderate multilevel discogenic degenerative changes of the  thoracic spine, more pronounced in the midthoracic spine as described  above.    Lab Results   Component Value Date    HGBA1C 5.0 05/30/2024       FUNCTIONAL HISTORY: The patient is independent in all ADLs, mobility, and driving. The patient does not use any assistive device.    SH:  Lives in: [place]  Lives with:   Occupation: Physical therapist Assistant  Tobacco: No  Alcohol: 2-3 drinks weekly  Drugs: No  __________________________________    ROS: The patient denies any bowel or bladder incontinence/accidents, night sweats, fevers, chills, recent significant weight loss. A 14 point review of systems was reviewed with the patient and is as above and otherwise negative.  ROS questionnaire positive for diarrhea, constipation, back pain     PHYSICAL EXAM    GEN - Alert, well-developed, well-nourished, no acute distress  PSYCH - Cooperative, appropriate mood and affect  HEENT - NC/AT  RESP - Non-labored respirations, equal expansion  CV - warm and well-perfused, No cyanosis or edema in extremities.  ABD- soft, ND  SKIN - No rash.    Previous:    BACK/SPINE - Symmetric posture, no erythema, edema, or swelling.  Full range of motion without provocation of pain.  There was significant tenderness and left more than right trigger points identified over the thoracic paraspinal muscles at the bra line.  No tenderness over the spinous processes.      NEURO - UE strength 5/5 -  including shoulder abduction, biceps, triceps, wrist extensors, finger flexors, interossei, and    LE strength 5/5 -  including hip flexors, knee flexors, knee extensors, ankle  dorsiflexors, ankle plantar flexors, and EHL   Sensation - intact to light touch in bilateral lower and upper extremities.   Reflexes - 2+ biceps, brachioradialis, triceps, patellar and Achilles reflexes bilaterally, Dean's negative bilaterally  GAIT - Normal base, normal stride length, non-antalgic. Able to toe walk and heel walk without difficulty.     PROCEDURE:    Lidocaine 1%- 6 cc's used, 0 cc's wasted  200mg/20mL (10mg/mL)  NDC 7201-7203-55  LOT [id number]  EXP 01/31/2026  Manuf: [facility]        Thoracic trigger point injections:    Description of the Procedure: The procedure, risks and alternative treatments were discussed with the patient. After written informed consent was obtained, the thoracic paraspinal muscles were palpated and marked. The skin was prepped three times with alcohol. Using a 27 gauge 1.5 inch needle, after negative aspiration, the trigger points in each muscle were injected with a total of 6 cc's of 1% lidocaine, spread equally into 8 sites. Twitch responses were observed in the musculature. The patient tolerated the procedure well with no immediate complications or bleeding.      Plan:   1. The patient was instructed in post-procedural care.  2. The patient was asked to apply moist heat and or ice for the next 24 hours and to perform daily gentle stretching exercises.     Physical Exam  Pulmonary:              IMPRESSION:    This is a pleasant 49 y.o. right handed woman with past medical history significant for hypothyroidism, gastroparesis, migraines, Raynaud's phenomenon, Von Willebrand disease, osteopenia on boniva, presents for follow-up of back pain.  Physical exam is reassuring for lack of neurologic compromise.  Symptoms and physical exam findings consistent with myofascial pain/tension, trigger points which she is likely constantly reactivating with her workout regimens.  Initial instigating event may have been this T8 compression fracture.    -Bilateral thoracic trigger point  injections performed as above.  There were no complications and she tolerated the procedure well.  She was provided with postprocedure instructions.  -Continue Diclofenac patch as needed  -Consider other medications in the future   -consider injections: repeat Trigger point injections as needed, Ultrasound-guided intercostal nerve block, referral to Dr. Matt for erector spinae blocks  -Consider repeat thoracic MRI  -Follow-up 4-6 weeks        The patient expressed understanding and agreement with the assessment and plan. Patient encouraged to contact us should they have any questions, concerns, or any changes in symptoms.     Thank you for allowing me to participate in the care of your patient.      ** Dictated with voice recognition software, please forgive any errors in grammar and/or spelling **

## 2025-01-14 ENCOUNTER — TELEPHONE (OUTPATIENT)
Dept: PRIMARY CARE | Facility: CLINIC | Age: 50
End: 2025-01-14

## 2025-01-14 ENCOUNTER — HOSPITAL ENCOUNTER (OUTPATIENT)
Dept: RADIOLOGY | Facility: HOSPITAL | Age: 50
Discharge: HOME | End: 2025-01-14
Payer: COMMERCIAL

## 2025-01-14 ENCOUNTER — APPOINTMENT (OUTPATIENT)
Dept: PHYSICAL MEDICINE AND REHAB | Facility: CLINIC | Age: 50
End: 2025-01-14
Payer: COMMERCIAL

## 2025-01-14 DIAGNOSIS — R09.02 HYPOXIA: ICD-10-CM

## 2025-01-14 PROCEDURE — 2550000001 HC RX 255 CONTRASTS: Performed by: FAMILY MEDICINE

## 2025-01-14 PROCEDURE — 71275 CT ANGIOGRAPHY CHEST: CPT | Performed by: RADIOLOGY

## 2025-01-14 PROCEDURE — 71275 CT ANGIOGRAPHY CHEST: CPT

## 2025-01-14 RX ADMIN — IOHEXOL 50 ML: 350 INJECTION, SOLUTION INTRAVENOUS at 16:03

## 2025-01-14 NOTE — TELEPHONE ENCOUNTER
Not sure where she wants to go from here. She can go to the ER if she wants to. I am ordering a stat CT to look for PE. If she wants to do that find out which rad department and call over.

## 2025-01-15 ENCOUNTER — TELEPHONE (OUTPATIENT)
Dept: PRIMARY CARE | Facility: CLINIC | Age: 50
End: 2025-01-15

## 2025-01-15 ENCOUNTER — LAB (OUTPATIENT)
Dept: LAB | Facility: LAB | Age: 50
End: 2025-01-15
Payer: COMMERCIAL

## 2025-01-15 ENCOUNTER — TELEPHONE (OUTPATIENT)
Dept: HEMATOLOGY/ONCOLOGY | Facility: HOSPITAL | Age: 50
End: 2025-01-15
Payer: COMMERCIAL

## 2025-01-15 DIAGNOSIS — E87.6 HYPOKALEMIA: Primary | ICD-10-CM

## 2025-01-15 DIAGNOSIS — E87.6 HYPOKALEMIA: ICD-10-CM

## 2025-01-15 LAB
ANION GAP SERPL CALC-SCNC: 13 MMOL/L (ref 10–20)
BUN SERPL-MCNC: 24 MG/DL (ref 6–23)
CALCIUM SERPL-MCNC: 8.9 MG/DL (ref 8.6–10.3)
CHLORIDE SERPL-SCNC: 108 MMOL/L (ref 98–107)
CO2 SERPL-SCNC: 19 MMOL/L (ref 21–32)
CREAT SERPL-MCNC: 1.07 MG/DL (ref 0.5–1.05)
EGFRCR SERPLBLD CKD-EPI 2021: 65 ML/MIN/1.73M*2
GLUCOSE SERPL-MCNC: 73 MG/DL (ref 74–99)
POTASSIUM SERPL-SCNC: 2.6 MMOL/L (ref 3.5–5.3)
SODIUM SERPL-SCNC: 137 MMOL/L (ref 136–145)

## 2025-01-15 PROCEDURE — 82533 TOTAL CORTISOL: CPT

## 2025-01-15 PROCEDURE — 80048 BASIC METABOLIC PNL TOTAL CA: CPT

## 2025-01-15 RX ORDER — METOPROLOL SUCCINATE 25 MG/1
25 TABLET, EXTENDED RELEASE ORAL DAILY
Qty: 30 TABLET | Refills: 5 | Status: SHIPPED | OUTPATIENT
Start: 2025-01-15 | End: 2025-07-14

## 2025-01-15 NOTE — RESULT ENCOUNTER NOTE
Her CT angio was negative for PE. No pneumonia. Is she wanting to try something to slow the heart rate until her potassium level normalizes?

## 2025-01-15 NOTE — TELEPHONE ENCOUNTER
Reached out to patient regarding her appointment cancellation request for her appointment with Dr. Ryan on 1/28/25. Patient declined rescheduling at this time.

## 2025-01-15 NOTE — PROGRESS NOTES
"Subjective   Patient ID: Sienna Acuña is a 49 y.o. female who presents for No chief complaint on file..    Migraines: On topiramate, qulipta, and rizatriptan for breakthrough. She has zofran prn for migraines.      Grief: On geodon and wellbutrin.     Hypothyroidism, reactive hypoglycemia: On synthroid and cytomel. Following with Dr. Maureen Roland. She is on metformin for reactive hypolgycemia.      CKD: She is on spironolactone. Following with Mis every 6 months.      Chronic constipation: She has history of sigmoidectomy for \"kinking\" in the colon. She has been told she has structural changes from the chronic constipation. She is on trulance for the last few years, she has tried a lot of meds without relief. She is taking miralax every day as well.      Clotting disorder: She has been told she has von willebrands and other times she has been told she has clotting deficiency. She takes DDAVP prior to surgeries or when she is having a bleeding episode. She has had 3 severe bleeding episodes (hysterectomy, sigmoidectomy, and then when she had her vaginal delivery).      Left knee pain: She is getting partial replacement in August with Dr. Green in [facility]. 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      She is going on a cruise soon but she has gotten seasick before and she is hoping to get rx for scopolamine.     She has a 22 year old daughter finishing up her psychology degree.      Review of systems completed and unremarkable other than what is documented in HPI.    Objective   There were no vitals taken for this visit.    Gen: No acute distress, alert and oriented x3, pleasant   HEENT: moist mucous membranes, b/l external auditory canals are clear of debris, TMs within normal limits, no oropharyngeal lesions, eomi, perrla   Neck: thyroid within normal limits, no lymphadenopathy   CV: RRR, normal S1/S2, no murmur   Resp: Clear to auscultation bilaterally, no wheezes or rhonchi " appreciated  Abd: soft, nontender, non-distended, no guarding/rigidity, bowel sounds present  Extr: no edema, no calf tenderness  Derm: Skin is warm and dry, no rashes appreciated  Psych: mood is good, affect is congruent, good hygiene, normal speech and eye contact  Neuro: cranial nerves grossly intact, normal gait    Assessment/Plan   #Migraines:   On topiramate, qulipta, and rizatriptan for breakthrough  She has zofran prn for migraines.      #Grief:   Currently on geodon and wellbutrin     #Hypothyroidism  #Reactive hypoglycemia:   On synthroid and cytomel  Following with Dr. Maureen Roland  She is on metformin for reactive hypolgycemia     #CKD:   She is on spironolactone  Following with Mis every 6 months.      #Chronic constipation:   #h/o sigmoidectomy  On trulance and miralax     #Clotting disorder:   H/o severe bleeding with surgeries  Using DDAVP prn for bleeds or prior to surgery  Establishing with Dr. Kruse     #Left knee pain:   Planning for partial replacement in August with Dr. Green     HCM:  UTD mammogram July  No further paps due to hysterectomy

## 2025-01-15 NOTE — RESULT ENCOUNTER NOTE
I am sending metoprolol. Did she end up getting her potassium checked yesterday? Did she have any updates after her ER visit?

## 2025-01-16 ENCOUNTER — APPOINTMENT (OUTPATIENT)
Dept: CARDIOLOGY | Facility: HOSPITAL | Age: 50
DRG: 641 | End: 2025-01-16
Payer: COMMERCIAL

## 2025-01-16 ENCOUNTER — APPOINTMENT (OUTPATIENT)
Dept: RADIOLOGY | Facility: HOSPITAL | Age: 50
DRG: 641 | End: 2025-01-16
Payer: COMMERCIAL

## 2025-01-16 ENCOUNTER — HOSPITAL ENCOUNTER (INPATIENT)
Facility: HOSPITAL | Age: 50
LOS: 2 days | Discharge: HOME | DRG: 641 | End: 2025-01-18
Attending: STUDENT IN AN ORGANIZED HEALTH CARE EDUCATION/TRAINING PROGRAM | Admitting: INTERNAL MEDICINE
Payer: COMMERCIAL

## 2025-01-16 DIAGNOSIS — Z86.69 HISTORY OF MIGRAINE HEADACHES: ICD-10-CM

## 2025-01-16 DIAGNOSIS — E87.20 METABOLIC ACIDOSIS: ICD-10-CM

## 2025-01-16 DIAGNOSIS — E87.6 HYPOKALEMIA: Primary | ICD-10-CM

## 2025-01-16 LAB
ALBUMIN SERPL BCP-MCNC: 4.8 G/DL (ref 3.4–5)
ALP SERPL-CCNC: 50 U/L (ref 33–110)
ALT SERPL W P-5'-P-CCNC: 25 U/L (ref 7–45)
ANION GAP BLDV CALCULATED.4IONS-SCNC: 14 MMOL/L (ref 10–25)
ANION GAP BLDV CALCULATED.4IONS-SCNC: 7 MMOL/L (ref 10–25)
ANION GAP SERPL CALC-SCNC: 11 MMOL/L (ref 10–20)
APAP SERPL-MCNC: <10 UG/ML
APPEARANCE UR: CLEAR
AST SERPL W P-5'-P-CCNC: 37 U/L (ref 9–39)
BASE EXCESS BLDV CALC-SCNC: -8.6 MMOL/L (ref -2–3)
BASE EXCESS BLDV CALC-SCNC: -9.3 MMOL/L (ref -2–3)
BASOPHILS # BLD AUTO: 0.09 X10*3/UL (ref 0–0.1)
BASOPHILS NFR BLD AUTO: 0.9 %
BILIRUB SERPL-MCNC: 0.4 MG/DL (ref 0–1.2)
BILIRUB UR STRIP.AUTO-MCNC: NEGATIVE MG/DL
BNP SERPL-MCNC: 16 PG/ML (ref 0–99)
BODY TEMPERATURE: ABNORMAL
BODY TEMPERATURE: ABNORMAL
BUN SERPL-MCNC: 22 MG/DL (ref 6–23)
CA-I BLDV-SCNC: 1.26 MMOL/L (ref 1.1–1.33)
CA-I BLDV-SCNC: 1.32 MMOL/L (ref 1.1–1.33)
CALCIUM SERPL-MCNC: 9.1 MG/DL (ref 8.6–10.3)
CARDIAC TROPONIN I PNL SERPL HS: 3 NG/L (ref 0–13)
CARDIAC TROPONIN I PNL SERPL HS: 3 NG/L (ref 0–13)
CHLORIDE BLDV-SCNC: 109 MMOL/L (ref 98–107)
CHLORIDE BLDV-SCNC: 111 MMOL/L (ref 98–107)
CHLORIDE SERPL-SCNC: 110 MMOL/L (ref 98–107)
CO2 SERPL-SCNC: 17 MMOL/L (ref 21–32)
COLOR UR: ABNORMAL
CORTIS SERPL-MCNC: 17.9 UG/DL (ref 2.5–20)
CREAT SERPL-MCNC: 1.1 MG/DL (ref 0.5–1.05)
EGFRCR SERPLBLD CKD-EPI 2021: 62 ML/MIN/1.73M*2
EOSINOPHIL # BLD AUTO: 0.18 X10*3/UL (ref 0–0.7)
EOSINOPHIL NFR BLD AUTO: 1.9 %
ERYTHROCYTE [DISTWIDTH] IN BLOOD BY AUTOMATED COUNT: 14.1 % (ref 11.5–14.5)
ETHANOL SERPL-MCNC: <10 MG/DL
GLUCOSE BLDV-MCNC: 117 MG/DL (ref 74–99)
GLUCOSE BLDV-MCNC: 161 MG/DL (ref 74–99)
GLUCOSE SERPL-MCNC: 61 MG/DL (ref 74–99)
GLUCOSE UR STRIP.AUTO-MCNC: ABNORMAL MG/DL
HCO3 BLDV-SCNC: 15.5 MMOL/L (ref 22–26)
HCO3 BLDV-SCNC: 18 MMOL/L (ref 22–26)
HCT VFR BLD AUTO: 41.1 % (ref 36–46)
HCT VFR BLD EST: 43 % (ref 36–46)
HCT VFR BLD EST: 44 % (ref 36–46)
HGB BLD-MCNC: 15.2 G/DL (ref 12–16)
HGB BLDV-MCNC: 14.2 G/DL (ref 12–16)
HGB BLDV-MCNC: 14.5 G/DL (ref 12–16)
IMM GRANULOCYTES # BLD AUTO: 0.05 X10*3/UL (ref 0–0.7)
IMM GRANULOCYTES NFR BLD AUTO: 0.5 % (ref 0–0.9)
INHALED O2 CONCENTRATION: 21 %
INHALED O2 CONCENTRATION: 21 %
KETONES UR STRIP.AUTO-MCNC: NEGATIVE MG/DL
LACTATE BLDV-SCNC: 0.9 MMOL/L (ref 0.4–2)
LACTATE BLDV-SCNC: 1.6 MMOL/L (ref 0.4–2)
LACTATE SERPL-SCNC: 0.8 MMOL/L (ref 0.4–2)
LEUKOCYTE ESTERASE UR QL STRIP.AUTO: NEGATIVE
LYMPHOCYTES # BLD AUTO: 3.28 X10*3/UL (ref 1.2–4.8)
LYMPHOCYTES NFR BLD AUTO: 33.8 %
MAGNESIUM SERPL-MCNC: 2.26 MG/DL (ref 1.6–2.4)
MCH RBC QN AUTO: 33.7 PG (ref 26–34)
MCHC RBC AUTO-ENTMCNC: 37 G/DL (ref 32–36)
MCV RBC AUTO: 91 FL (ref 80–100)
MONOCYTES # BLD AUTO: 0.6 X10*3/UL (ref 0.1–1)
MONOCYTES NFR BLD AUTO: 6.2 %
MUCOUS THREADS #/AREA URNS AUTO: NORMAL /LPF
NEUTROPHILS # BLD AUTO: 5.51 X10*3/UL (ref 1.2–7.7)
NEUTROPHILS NFR BLD AUTO: 56.7 %
NITRITE UR QL STRIP.AUTO: NEGATIVE
NRBC BLD-RTO: 0 /100 WBCS (ref 0–0)
OXYHGB MFR BLDV: 30.8 % (ref 45–75)
OXYHGB MFR BLDV: 91.3 % (ref 45–75)
PCO2 BLDV: 28 MM HG (ref 41–51)
PCO2 BLDV: 43 MM HG (ref 41–51)
PH BLDV: 7.23 PH (ref 7.33–7.43)
PH BLDV: 7.35 PH (ref 7.33–7.43)
PH UR STRIP.AUTO: 6.5 [PH]
PHOSPHATE SERPL-MCNC: 2.1 MG/DL (ref 2.5–4.9)
PLATELET # BLD AUTO: 385 X10*3/UL (ref 150–450)
PO2 BLDV: 17 MM HG (ref 35–45)
PO2 BLDV: 64 MM HG (ref 35–45)
POTASSIUM BLDV-SCNC: 2.1 MMOL/L (ref 3.5–5.3)
POTASSIUM BLDV-SCNC: 2.2 MMOL/L (ref 3.5–5.3)
POTASSIUM SERPL-SCNC: 3.1 MMOL/L (ref 3.5–5.3)
PROT SERPL-MCNC: 7.4 G/DL (ref 6.4–8.2)
PROT UR STRIP.AUTO-MCNC: ABNORMAL MG/DL
RBC # BLD AUTO: 4.51 X10*6/UL (ref 4–5.2)
RBC # UR STRIP.AUTO: NEGATIVE /UL
RBC #/AREA URNS AUTO: NORMAL /HPF
SALICYLATES SERPL-MCNC: <3 MG/DL
SAO2 % BLDV: 31 % (ref 45–75)
SAO2 % BLDV: 94 % (ref 45–75)
SODIUM BLDV-SCNC: 132 MMOL/L (ref 136–145)
SODIUM BLDV-SCNC: 138 MMOL/L (ref 136–145)
SODIUM SERPL-SCNC: 135 MMOL/L (ref 136–145)
SP GR UR STRIP.AUTO: 1.02
SQUAMOUS #/AREA URNS AUTO: NORMAL /HPF
TSH SERPL-ACNC: 0.87 MIU/L (ref 0.44–3.98)
UROBILINOGEN UR STRIP.AUTO-MCNC: NORMAL MG/DL
WBC # BLD AUTO: 9.7 X10*3/UL (ref 4.4–11.3)
WBC #/AREA URNS AUTO: NORMAL /HPF

## 2025-01-16 PROCEDURE — 85025 COMPLETE CBC W/AUTO DIFF WBC: CPT | Performed by: PHYSICIAN ASSISTANT

## 2025-01-16 PROCEDURE — 2500000001 HC RX 250 WO HCPCS SELF ADMINISTERED DRUGS (ALT 637 FOR MEDICARE OP)

## 2025-01-16 PROCEDURE — 84075 ASSAY ALKALINE PHOSPHATASE: CPT | Performed by: PHYSICIAN ASSISTANT

## 2025-01-16 PROCEDURE — 93005 ELECTROCARDIOGRAM TRACING: CPT

## 2025-01-16 PROCEDURE — 99223 1ST HOSP IP/OBS HIGH 75: CPT

## 2025-01-16 PROCEDURE — 83605 ASSAY OF LACTIC ACID: CPT | Performed by: PHYSICIAN ASSISTANT

## 2025-01-16 PROCEDURE — 82139 AMINO ACIDS QUAN 6 OR MORE: CPT

## 2025-01-16 PROCEDURE — 84132 ASSAY OF SERUM POTASSIUM: CPT | Performed by: PHYSICIAN ASSISTANT

## 2025-01-16 PROCEDURE — 84100 ASSAY OF PHOSPHORUS: CPT

## 2025-01-16 PROCEDURE — 83880 ASSAY OF NATRIURETIC PEPTIDE: CPT | Performed by: PHYSICIAN ASSISTANT

## 2025-01-16 PROCEDURE — 83735 ASSAY OF MAGNESIUM: CPT | Performed by: PHYSICIAN ASSISTANT

## 2025-01-16 PROCEDURE — 84681 ASSAY OF C-PEPTIDE: CPT | Mod: GEALAB

## 2025-01-16 PROCEDURE — 2500000004 HC RX 250 GENERAL PHARMACY W/ HCPCS (ALT 636 FOR OP/ED): Performed by: STUDENT IN AN ORGANIZED HEALTH CARE EDUCATION/TRAINING PROGRAM

## 2025-01-16 PROCEDURE — 84443 ASSAY THYROID STIM HORMONE: CPT | Performed by: PHYSICIAN ASSISTANT

## 2025-01-16 PROCEDURE — 81001 URINALYSIS AUTO W/SCOPE: CPT | Performed by: STUDENT IN AN ORGANIZED HEALTH CARE EDUCATION/TRAINING PROGRAM

## 2025-01-16 PROCEDURE — 2500000002 HC RX 250 W HCPCS SELF ADMINISTERED DRUGS (ALT 637 FOR MEDICARE OP, ALT 636 FOR OP/ED): Performed by: PHYSICIAN ASSISTANT

## 2025-01-16 PROCEDURE — 84484 ASSAY OF TROPONIN QUANT: CPT | Performed by: PHYSICIAN ASSISTANT

## 2025-01-16 PROCEDURE — 82306 VITAMIN D 25 HYDROXY: CPT | Mod: GEALAB

## 2025-01-16 PROCEDURE — 36415 COLL VENOUS BLD VENIPUNCTURE: CPT | Performed by: PHYSICIAN ASSISTANT

## 2025-01-16 PROCEDURE — 82570 ASSAY OF URINE CREATININE: CPT | Mod: GEALAB

## 2025-01-16 PROCEDURE — 96374 THER/PROPH/DIAG INJ IV PUSH: CPT

## 2025-01-16 PROCEDURE — 70450 CT HEAD/BRAIN W/O DYE: CPT

## 2025-01-16 PROCEDURE — 96375 TX/PRO/DX INJ NEW DRUG ADDON: CPT

## 2025-01-16 PROCEDURE — 70450 CT HEAD/BRAIN W/O DYE: CPT | Performed by: RADIOLOGY

## 2025-01-16 PROCEDURE — 2500000005 HC RX 250 GENERAL PHARMACY W/O HCPCS: Performed by: PHYSICIAN ASSISTANT

## 2025-01-16 PROCEDURE — 80320 DRUG SCREEN QUANTALCOHOLS: CPT | Performed by: STUDENT IN AN ORGANIZED HEALTH CARE EDUCATION/TRAINING PROGRAM

## 2025-01-16 PROCEDURE — 99285 EMERGENCY DEPT VISIT HI MDM: CPT | Mod: 25 | Performed by: STUDENT IN AN ORGANIZED HEALTH CARE EDUCATION/TRAINING PROGRAM

## 2025-01-16 PROCEDURE — 2500000005 HC RX 250 GENERAL PHARMACY W/O HCPCS

## 2025-01-16 PROCEDURE — 1100000001 HC PRIVATE ROOM DAILY

## 2025-01-16 PROCEDURE — 84132 ASSAY OF SERUM POTASSIUM: CPT

## 2025-01-16 PROCEDURE — 83525 ASSAY OF INSULIN: CPT | Mod: GEALAB

## 2025-01-16 PROCEDURE — 2500000005 HC RX 250 GENERAL PHARMACY W/O HCPCS: Performed by: STUDENT IN AN ORGANIZED HEALTH CARE EDUCATION/TRAINING PROGRAM

## 2025-01-16 PROCEDURE — 82436 ASSAY OF URINE CHLORIDE: CPT | Mod: GEALAB | Performed by: STUDENT IN AN ORGANIZED HEALTH CARE EDUCATION/TRAINING PROGRAM

## 2025-01-16 RX ORDER — ONDANSETRON 4 MG/1
4 TABLET, FILM COATED ORAL EVERY 8 HOURS PRN
Status: DISCONTINUED | OUTPATIENT
Start: 2025-01-16 | End: 2025-01-18 | Stop reason: HOSPADM

## 2025-01-16 RX ORDER — TOPIRAMATE 25 MG/1
100 TABLET ORAL DAILY
Status: DISCONTINUED | OUTPATIENT
Start: 2025-01-16 | End: 2025-01-17

## 2025-01-16 RX ORDER — ACARBOSE 25 MG/1
25 TABLET ORAL
Status: DISCONTINUED | OUTPATIENT
Start: 2025-01-17 | End: 2025-01-18 | Stop reason: HOSPADM

## 2025-01-16 RX ORDER — METOPROLOL SUCCINATE 25 MG/1
25 TABLET, EXTENDED RELEASE ORAL DAILY
Status: DISCONTINUED | OUTPATIENT
Start: 2025-01-17 | End: 2025-01-16

## 2025-01-16 RX ORDER — BUPROPION HYDROCHLORIDE 150 MG/1
300 TABLET ORAL EVERY MORNING
Status: DISCONTINUED | OUTPATIENT
Start: 2025-01-17 | End: 2025-01-18 | Stop reason: HOSPADM

## 2025-01-16 RX ORDER — VILAZODONE HYDROCHLORIDE 20 MG/1
40 TABLET ORAL
Status: DISCONTINUED | OUTPATIENT
Start: 2025-01-17 | End: 2025-01-18 | Stop reason: HOSPADM

## 2025-01-16 RX ORDER — LIOTHYRONINE SODIUM 5 UG/1
5 TABLET ORAL 2 TIMES DAILY
Status: DISCONTINUED | OUTPATIENT
Start: 2025-01-16 | End: 2025-01-18 | Stop reason: HOSPADM

## 2025-01-16 RX ORDER — POTASSIUM CHLORIDE 20 MEQ/1
40 TABLET, EXTENDED RELEASE ORAL ONCE
Status: COMPLETED | OUTPATIENT
Start: 2025-01-16 | End: 2025-01-16

## 2025-01-16 RX ORDER — ASPIRIN 325 MG
50000 TABLET, DELAYED RELEASE (ENTERIC COATED) ORAL
Status: DISCONTINUED | OUTPATIENT
Start: 2025-02-11 | End: 2025-01-16

## 2025-01-16 RX ORDER — ZIPRASIDONE HYDROCHLORIDE 20 MG/1
20 CAPSULE ORAL
Status: DISCONTINUED | OUTPATIENT
Start: 2025-01-16 | End: 2025-01-18 | Stop reason: HOSPADM

## 2025-01-16 RX ORDER — DEXTROSE 50 % IN WATER (D50W) INTRAVENOUS SYRINGE
25 ONCE
Status: COMPLETED | OUTPATIENT
Start: 2025-01-16 | End: 2025-01-16

## 2025-01-16 RX ORDER — LEVOTHYROXINE SODIUM 100 UG/1
100 TABLET ORAL DAILY
Status: DISCONTINUED | OUTPATIENT
Start: 2025-01-17 | End: 2025-01-18 | Stop reason: HOSPADM

## 2025-01-16 RX ORDER — ESTRADIOL 0.1 MG/G
1 CREAM VAGINAL 2 TIMES WEEKLY
Status: DISCONTINUED | OUTPATIENT
Start: 2025-01-16 | End: 2025-01-18 | Stop reason: HOSPADM

## 2025-01-16 RX ADMIN — ZIPRASIDONE HYDROCHLORIDE 20 MG: 20 CAPSULE ORAL at 22:47

## 2025-01-16 RX ADMIN — POTASSIUM CHLORIDE 40 MEQ: 1500 TABLET, EXTENDED RELEASE ORAL at 16:45

## 2025-01-16 RX ADMIN — ESTRADIOL 1 G: 0.1 CREAM VAGINAL at 22:48

## 2025-01-16 RX ADMIN — SODIUM BICARBONATE 100 ML/HR: 84 INJECTION INTRAVENOUS at 18:02

## 2025-01-16 RX ADMIN — DEXTROSE MONOHYDRATE 25 G: 25 INJECTION, SOLUTION INTRAVENOUS at 16:45

## 2025-01-16 RX ADMIN — POTASSIUM PHOSPHATE, MONOBASIC 500 MG: 500 TABLET, SOLUBLE ORAL at 23:08

## 2025-01-16 RX ADMIN — TOPIRAMATE 100 MG: 25 TABLET, FILM COATED ORAL at 22:47

## 2025-01-16 SDOH — ECONOMIC STABILITY: INCOME INSECURITY: IN THE PAST 12 MONTHS HAS THE ELECTRIC, GAS, OIL, OR WATER COMPANY THREATENED TO SHUT OFF SERVICES IN YOUR HOME?: NO

## 2025-01-16 SDOH — SOCIAL STABILITY: SOCIAL INSECURITY: HAS ANYONE EVER THREATENED TO HURT YOUR FAMILY OR YOUR PETS?: NO

## 2025-01-16 SDOH — SOCIAL STABILITY: SOCIAL INSECURITY: ARE YOU OR HAVE YOU BEEN THREATENED OR ABUSED PHYSICALLY, EMOTIONALLY, OR SEXUALLY BY ANYONE?: NO

## 2025-01-16 SDOH — SOCIAL STABILITY: SOCIAL INSECURITY: WERE YOU ABLE TO COMPLETE ALL THE BEHAVIORAL HEALTH SCREENINGS?: YES

## 2025-01-16 SDOH — ECONOMIC STABILITY: FOOD INSECURITY: WITHIN THE PAST 12 MONTHS, YOU WORRIED THAT YOUR FOOD WOULD RUN OUT BEFORE YOU GOT THE MONEY TO BUY MORE.: NEVER TRUE

## 2025-01-16 SDOH — SOCIAL STABILITY: SOCIAL INSECURITY: WITHIN THE LAST YEAR, HAVE YOU BEEN AFRAID OF YOUR PARTNER OR EX-PARTNER?: NO

## 2025-01-16 SDOH — SOCIAL STABILITY: SOCIAL INSECURITY: HAVE YOU HAD ANY THOUGHTS OF HARMING ANYONE ELSE?: NO

## 2025-01-16 SDOH — SOCIAL STABILITY: SOCIAL INSECURITY: ABUSE: ADULT

## 2025-01-16 SDOH — SOCIAL STABILITY: SOCIAL INSECURITY
WITHIN THE LAST YEAR, HAVE YOU BEEN RAPED OR FORCED TO HAVE ANY KIND OF SEXUAL ACTIVITY BY YOUR PARTNER OR EX-PARTNER?: NO

## 2025-01-16 SDOH — ECONOMIC STABILITY: FOOD INSECURITY: WITHIN THE PAST 12 MONTHS, THE FOOD YOU BOUGHT JUST DIDN'T LAST AND YOU DIDN'T HAVE MONEY TO GET MORE.: NEVER TRUE

## 2025-01-16 SDOH — SOCIAL STABILITY: SOCIAL INSECURITY: WITHIN THE LAST YEAR, HAVE YOU BEEN HUMILIATED OR EMOTIONALLY ABUSED IN OTHER WAYS BY YOUR PARTNER OR EX-PARTNER?: NO

## 2025-01-16 SDOH — SOCIAL STABILITY: SOCIAL INSECURITY: DOES ANYONE TRY TO KEEP YOU FROM HAVING/CONTACTING OTHER FRIENDS OR DOING THINGS OUTSIDE YOUR HOME?: NO

## 2025-01-16 SDOH — SOCIAL STABILITY: SOCIAL INSECURITY: ARE THERE ANY APPARENT SIGNS OF INJURIES/BEHAVIORS THAT COULD BE RELATED TO ABUSE/NEGLECT?: NO

## 2025-01-16 SDOH — SOCIAL STABILITY: SOCIAL INSECURITY: DO YOU FEEL ANYONE HAS EXPLOITED OR TAKEN ADVANTAGE OF YOU FINANCIALLY OR OF YOUR PERSONAL PROPERTY?: NO

## 2025-01-16 SDOH — SOCIAL STABILITY: SOCIAL INSECURITY
WITHIN THE LAST YEAR, HAVE YOU BEEN KICKED, HIT, SLAPPED, OR OTHERWISE PHYSICALLY HURT BY YOUR PARTNER OR EX-PARTNER?: NO

## 2025-01-16 SDOH — SOCIAL STABILITY: SOCIAL INSECURITY: HAVE YOU HAD THOUGHTS OF HARMING ANYONE ELSE?: NO

## 2025-01-16 SDOH — SOCIAL STABILITY: SOCIAL INSECURITY: DO YOU FEEL UNSAFE GOING BACK TO THE PLACE WHERE YOU ARE LIVING?: NO

## 2025-01-16 ASSESSMENT — LIFESTYLE VARIABLES
HOW OFTEN DO YOU HAVE A DRINK CONTAINING ALCOHOL: 2-4 TIMES A MONTH
AUDIT-C TOTAL SCORE: 2
HOW OFTEN DO YOU HAVE 6 OR MORE DRINKS ON ONE OCCASION: NEVER
HAVE PEOPLE ANNOYED YOU BY CRITICIZING YOUR DRINKING: NO
SKIP TO QUESTIONS 9-10: 1
EVER FELT BAD OR GUILTY ABOUT YOUR DRINKING: NO
EVER HAD A DRINK FIRST THING IN THE MORNING TO STEADY YOUR NERVES TO GET RID OF A HANGOVER: NO
HOW MANY STANDARD DRINKS CONTAINING ALCOHOL DO YOU HAVE ON A TYPICAL DAY: 1 OR 2
AUDIT-C TOTAL SCORE: 2
HAVE YOU EVER FELT YOU SHOULD CUT DOWN ON YOUR DRINKING: NO
TOTAL SCORE: 0

## 2025-01-16 ASSESSMENT — PATIENT HEALTH QUESTIONNAIRE - PHQ9
1. LITTLE INTEREST OR PLEASURE IN DOING THINGS: SEVERAL DAYS
2. FEELING DOWN, DEPRESSED OR HOPELESS: SEVERAL DAYS
SUM OF ALL RESPONSES TO PHQ9 QUESTIONS 1 & 2: 2

## 2025-01-16 ASSESSMENT — ACTIVITIES OF DAILY LIVING (ADL)
ADEQUATE_TO_COMPLETE_ADL: YES
PATIENT'S MEMORY ADEQUATE TO SAFELY COMPLETE DAILY ACTIVITIES?: YES
FEEDING YOURSELF: INDEPENDENT
BATHING: INDEPENDENT
LACK_OF_TRANSPORTATION: NO
DRESSING YOURSELF: INDEPENDENT
WALKS IN HOME: INDEPENDENT
HEARING - LEFT EAR: FUNCTIONAL
TOILETING: INDEPENDENT
HEARING - RIGHT EAR: FUNCTIONAL
GROOMING: INDEPENDENT
JUDGMENT_ADEQUATE_SAFELY_COMPLETE_DAILY_ACTIVITIES: YES

## 2025-01-16 ASSESSMENT — COGNITIVE AND FUNCTIONAL STATUS - GENERAL
PATIENT BASELINE BEDBOUND: NO
CLIMB 3 TO 5 STEPS WITH RAILING: A LITTLE
DAILY ACTIVITIY SCORE: 24
MOBILITY SCORE: 22
WALKING IN HOSPITAL ROOM: A LITTLE

## 2025-01-16 ASSESSMENT — COLUMBIA-SUICIDE SEVERITY RATING SCALE - C-SSRS
2. HAVE YOU ACTUALLY HAD ANY THOUGHTS OF KILLING YOURSELF?: NO
6. HAVE YOU EVER DONE ANYTHING, STARTED TO DO ANYTHING, OR PREPARED TO DO ANYTHING TO END YOUR LIFE?: NO
1. IN THE PAST MONTH, HAVE YOU WISHED YOU WERE DEAD OR WISHED YOU COULD GO TO SLEEP AND NOT WAKE UP?: NO

## 2025-01-16 ASSESSMENT — PAIN DESCRIPTION - LOCATION: LOCATION: HEAD

## 2025-01-16 ASSESSMENT — PAIN DESCRIPTION - DESCRIPTORS: DESCRIPTORS: ACHING

## 2025-01-16 ASSESSMENT — PAIN - FUNCTIONAL ASSESSMENT: PAIN_FUNCTIONAL_ASSESSMENT: 0-10

## 2025-01-16 ASSESSMENT — PAIN SCALES - GENERAL: PAINLEVEL_OUTOF10: 3

## 2025-01-16 ASSESSMENT — PAIN DESCRIPTION - PAIN TYPE: TYPE: ACUTE PAIN

## 2025-01-16 NOTE — ED PROVIDER NOTES
"HPI   Chief Complaint   Patient presents with    Shortness of Breath       This is a 49-year-old female with PMH anxiety, clotting disorder, CKD, hypothyroidism, irritable bowel syndrome presenting for evaluation of low potassium.  She was on losartan but this was stopped by her doctor a couple of weeks ago, was started on K supplementation, went on a cruise a week ago, developed n/v/d. Her K was rechecked and found to be low. Had an ER visit due to tachycardia, sob. Negative CT angio for PE. Her K was replenished. She has been taking 20 of K at home BID, her K is still low so her PCP sent her in to be admitted.  at bedside says that the patient's speech has been \"slurred\" intermittently for the past several days as well. Patient says she doesn't feel right. She has been placed on a beta blocker for her heart rate and it has improved and since she has been on it her heart rate has been controlled and she has not been having any shortness of breath any more.              Patient History   Past Medical History:   Diagnosis Date    Abnormal CT of thoracic spine 03/31/2017    Abnormal platelet function test (Multi) 01/28/2014    Anemia     Anxiety     Baker's cyst     Baker's cyst of knee, left     Blood in stool 04/16/2015    Colonoscopy & EGD tomorrow  Will speak with Q3 regarding procedures tomorrow   Bowel prep tonight      Bright red blood per rectum 08/22/2017    Chilblain lupus     Chronic kidney disease     Clotting disorder (Multi)     Depression     Disease of thyroid gland     Gastritis 01/2022    Hematuria 03/08/2016    Hypothyroidism     IBS (irritable bowel syndrome)     Infection due to fungus 11/14/2024    Inflammatory bowel disease     Irritation of vulva 11/14/2024    Left thigh pain 11/20/2014    Migraines     Muscle strain of gluteal region 12/05/2014    OAB (overactive bladder)     Osteopenia     Overactive bladder     PONV (postoperative nausea and vomiting)     Positive JENNIFER (antinuclear " antibody)     Pyelonephritis 2024      Swelling of extremity 2024     Past Surgical History:   Procedure Laterality Date    BREAST SURGERY Bilateral     AUGMENTATION     SECTION, CLASSIC  2013     Section     SECTION, LOW TRANSVERSE  10-7-2003    CHOLECYSTECTOMY      COLON SURGERY      COLONOSCOPY      CT ABDOMEN PELVIS ANGIOGRAM W AND/OR WO IV CONTRAST  2014    CT ABDOMEN PELVIS ANGIOGRAM W AND/OR WO IV CONTRAST 2014 [facility]    ESOPHAGOGASTRODUODENOSCOPY  2022    HYSTERECTOMY  04/10/2013    Hysterectomy    KNEE ARTHROPLASTY  2024    KNEE ARTHROSCOPY W/ DEBRIDEMENT Left 10/30/2024    Left knee scope AJD, debridement of bakers cyst via posteromedial portal    KNEE ARTHROSCOPY, MEDIAL PATELLO FEMORAL LIGAMENT REPAIR Left     SIGMOIDECTOMY      SIGMOIDECTOMY      TUBAL LIGATION  2013    Tubal Ligation     Family History   Problem Relation Name Age of Onset    Diabetes Other grandparent     Other (thyroid disorder) Other grandparent     Endometriosis Other aunt     Cancer Other      Diabetes Other      Endometriosis Other      Other (thyroid disorder) Other      Blood Disorder Mother Mom     Cancer Mother Mom     Clotting disorder Mother Mom     Colon cancer Mother Mom     Arthritis Maternal Grandmother Grandma     Kidney disease Maternal Grandmother Grandma     Diabetes Paternal Grandfather Maximilian Vargas     Diabetes Brother Mando Vargas     Stroke Brother Mando Vargas      Social History     Tobacco Use    Smoking status: Never    Smokeless tobacco: Never   Vaping Use    Vaping status: Never Used   Substance Use Topics    Alcohol use: Yes     Alcohol/week: 2.0 standard drinks of alcohol     Types: 2 Glasses of wine per week     Comment: weekends    Drug use: Never       Physical Exam   ED Triage Vitals [25 1548]   Temperature Heart Rate Respirations BP   36.4 °C (97.5 °F) 68 18 141/67      Pulse Ox Temp Source Heart Rate  Source Patient Position   100 % Temporal Monitor Sitting      BP Location FiO2 (%)     Right arm --       Physical Exam  Constitutional:       Appearance: Normal appearance.   HENT:      Mouth/Throat:      Mouth: Mucous membranes are moist.      Pharynx: Oropharynx is clear.   Cardiovascular:      Rate and Rhythm: Normal rate and regular rhythm.      Pulses: Normal pulses.      Heart sounds: Normal heart sounds.   Pulmonary:      Effort: Pulmonary effort is normal.      Breath sounds: Normal breath sounds.   Abdominal:      Palpations: Abdomen is soft.      Tenderness: There is no abdominal tenderness. There is no guarding or rebound.   Musculoskeletal:      Cervical back: Normal range of motion and neck supple.   Skin:     General: Skin is warm and dry.   Neurological:      General: No focal deficit present.      Mental Status: She is alert and oriented to person, place, and time.           ED Course & MDM   ED Course as of 01/16/25 1948   Thu Jan 16, 2025 1935 49-year-old presents to the emergency department the 12 pound weight loss since her trip to [employer] in [place].  She states she has not been hungry.  Concern for potential renal tubular acidosis as she has a low bicarb and is acidotic.  She also has a low potassium.  Urine electrolytes were added as well as an acute tox panel however patient denies taking heavy doses of aspirin patient will require admission for further workup and evaluation.  Case discussed with nephrology who is on consultation. [HD]      ED Course User Index  [HD] Phuong Yañez DO         Diagnoses as of 01/16/25 1948   Metabolic acidosis   Hypokalemia                 No data recorded     [place] Coma Scale Score: 15 (01/16/25 1548 : Elier Poon RN)                           Medical Decision Making  Patient is stable hemodynamically.  Visibly nontoxic-appearing open distress.  No focal deficits.  Her potassium today is 3.1, ordered 40 mill equivalents p.o. with a chloride of  110 bicarb 17 creatinine 1.10 sodium 135 glucose 61.  D50 1 amp intravenous ordered.  No acute EKG changes.  Patient was found to have metabolic acidosis on blood gas with a bicarb of 18 so IV sodium bicarb was ordered.  Discussed the case with Dr. Bellamy nephrology who agrees with plan and bring the patient into the hospitalist service.  I discussed with the hospitalist who accepted.  This visit was staffed with the attending physician Dr. Yañez.      Disclaimer: This note was dictated using speech recognition software. An attempt at proofreading was made to minimize errors. Minor errors in transcription may be present. Please call if questions.    Amount and/or Complexity of Data Reviewed  Labs: ordered.  Radiology: ordered.  ECG/medicine tests: ordered and independent interpretation performed.     Details: EKG interpreted by me: Normal sinus rhythm.  Rate 64.  Normal axis.  QTc 451.  No acute T wave changes.  No STEMI.        Procedure  Procedures     Chester Wakefield PA-C  01/16/25 1950

## 2025-01-16 NOTE — ED TRIAGE NOTES
Patient was on a cruise at the beginning of the month, while on the cruise patient states she was having SOB, nausea and vomiting, patient was seen in the ED multiple times and diagnosed with low potassium but has failed outpatient treatment. Patient was seen by her primary care for the continued SOB, weakness, fatigue, nausea and tachycardia, had a negative CT of the chest for PE's but her blood work showed her potassium is still low, patient was sent in by her PCP for admission to the hospital.

## 2025-01-17 ENCOUNTER — APPOINTMENT (OUTPATIENT)
Dept: RADIOLOGY | Facility: HOSPITAL | Age: 50
DRG: 641 | End: 2025-01-17
Payer: COMMERCIAL

## 2025-01-17 LAB
25(OH)D3 SERPL-MCNC: 39 NG/ML (ref 30–100)
ALBUMIN SERPL BCP-MCNC: 3.3 G/DL (ref 3.4–5)
ALBUMIN SERPL BCP-MCNC: 3.4 G/DL (ref 3.4–5)
ALBUMIN SERPL BCP-MCNC: 3.5 G/DL (ref 3.4–5)
ALBUMIN SERPL BCP-MCNC: 3.5 G/DL (ref 3.4–5)
ANION GAP BLDV CALCULATED.4IONS-SCNC: 10 MMOL/L (ref 10–25)
ANION GAP SERPL CALC-SCNC: 10 MMOL/L
ANION GAP SERPL CALC-SCNC: 10 MMOL/L (ref 10–20)
ANION GAP SERPL CALC-SCNC: 7 MMOL/L (ref 10–20)
ANION GAP SERPL CALC-SCNC: 8 MMOL/L (ref 10–20)
ATRIAL RATE: 64 BPM
BASE EXCESS BLDV CALC-SCNC: -2.5 MMOL/L (ref -2–3)
BODY TEMPERATURE: ABNORMAL
BUN SERPL-MCNC: 12 MG/DL (ref 6–23)
BUN SERPL-MCNC: 13 MG/DL (ref 6–23)
BUN SERPL-MCNC: 17 MG/DL (ref 6–23)
BUN SERPL-MCNC: 20 MG/DL (ref 6–23)
C PEPTIDE SERPL-MCNC: 13 NG/ML (ref 0.7–3.9)
CA-I BLDV-SCNC: 1.15 MMOL/L (ref 1.1–1.33)
CALCIUM SERPL-MCNC: 7.2 MG/DL (ref 8.6–10.3)
CALCIUM SERPL-MCNC: 7.6 MG/DL (ref 8.6–10.3)
CALCIUM SERPL-MCNC: 7.7 MG/DL (ref 8.6–10.3)
CALCIUM SERPL-MCNC: 7.7 MG/DL (ref 8.6–10.3)
CHLORIDE BLDV-SCNC: 108 MMOL/L (ref 98–107)
CHLORIDE SERPL-SCNC: 110 MMOL/L (ref 98–107)
CHLORIDE SERPL-SCNC: 111 MMOL/L (ref 98–107)
CHLORIDE SERPL-SCNC: 112 MMOL/L (ref 98–107)
CHLORIDE SERPL-SCNC: 113 MMOL/L (ref 98–107)
CHLORIDE UR-SCNC: 58 MMOL/L
CHLORIDE/CREATININE (MMOL/G) IN URINE: 60 MMOL/G CREAT (ref 38–318)
CO2 SERPL-SCNC: 16 MMOL/L (ref 21–32)
CO2 SERPL-SCNC: 22 MMOL/L (ref 21–32)
CO2 SERPL-SCNC: 22 MMOL/L (ref 21–32)
CO2 SERPL-SCNC: 23 MMOL/L (ref 21–32)
CREAT SERPL-MCNC: 0.9 MG/DL (ref 0.5–1.05)
CREAT SERPL-MCNC: 0.95 MG/DL (ref 0.5–1.05)
CREAT SERPL-MCNC: 1 MG/DL (ref 0.5–1.05)
CREAT SERPL-MCNC: 1.02 MG/DL (ref 0.5–1.05)
CREAT UR-MCNC: 102.4 MG/DL (ref 20–320)
CREAT UR-MCNC: 96.8 MG/DL (ref 20–320)
EGFRCR SERPLBLD CKD-EPI 2021: 68 ML/MIN/1.73M*2
EGFRCR SERPLBLD CKD-EPI 2021: 70 ML/MIN/1.73M*2
EGFRCR SERPLBLD CKD-EPI 2021: 75 ML/MIN/1.73M*2
EGFRCR SERPLBLD CKD-EPI 2021: 80 ML/MIN/1.73M*2
ERYTHROCYTE [DISTWIDTH] IN BLOOD BY AUTOMATED COUNT: 13.9 % (ref 11.5–14.5)
GLUCOSE BLDV-MCNC: 88 MG/DL (ref 74–99)
GLUCOSE SERPL-MCNC: 112 MG/DL (ref 74–99)
GLUCOSE SERPL-MCNC: 86 MG/DL (ref 74–99)
GLUCOSE SERPL-MCNC: 87 MG/DL (ref 74–99)
GLUCOSE SERPL-MCNC: 96 MG/DL (ref 74–99)
HCO3 BLDV-SCNC: 22.5 MMOL/L (ref 22–26)
HCT VFR BLD AUTO: 33.6 % (ref 36–46)
HCT VFR BLD EST: 41 % (ref 36–46)
HGB BLD-MCNC: 12.3 G/DL (ref 12–16)
HGB BLDV-MCNC: 13.8 G/DL (ref 12–16)
INHALED O2 CONCENTRATION: 21 %
INSULIN SERPL-ACNC: 66 UIU/ML (ref 3–25)
LACTATE BLDV-SCNC: 0.9 MMOL/L (ref 0.4–2)
MAGNESIUM SERPL-MCNC: 1.89 MG/DL (ref 1.6–2.4)
MCH RBC QN AUTO: 32.7 PG (ref 26–34)
MCHC RBC AUTO-ENTMCNC: 36.6 G/DL (ref 32–36)
MCV RBC AUTO: 89 FL (ref 80–100)
NRBC BLD-RTO: 0 /100 WBCS (ref 0–0)
OXYHGB MFR BLDV: 42.7 % (ref 45–75)
P AXIS: 67 DEGREES
P OFFSET: 177 MS
P ONSET: 138 MS
PCO2 BLDV: 39 MM HG (ref 41–51)
PH BLDV: 7.37 PH (ref 7.33–7.43)
PHOSPHATE SERPL-MCNC: 1.2 MG/DL (ref 2.5–4.9)
PHOSPHATE SERPL-MCNC: 1.3 MG/DL (ref 2.5–4.9)
PHOSPHATE SERPL-MCNC: 1.3 MG/DL (ref 2.5–4.9)
PHOSPHATE SERPL-MCNC: <1 MG/DL (ref 2.5–4.9)
PHOSPHATE UR-MCNC: <10 MG/DL
PHOSPHORUS/CREATININE (MG/G) RATIO IN URINE: NORMAL
PLATELET # BLD AUTO: 295 X10*3/UL (ref 150–450)
PO2 BLDV: 26 MM HG (ref 35–45)
POTASSIUM BLDV-SCNC: 2.6 MMOL/L (ref 3.5–5.3)
POTASSIUM SERPL-SCNC: 2.2 MMOL/L (ref 3.5–5.3)
POTASSIUM SERPL-SCNC: 2.8 MMOL/L (ref 3.5–5.3)
POTASSIUM UR-SCNC: 7 MMOL/L
POTASSIUM/CREAT UR-RTO: 7 MMOL/G CREAT
PR INTERVAL: 142 MS
Q ONSET: 209 MS
QRS COUNT: 11 BEATS
QRS DURATION: 98 MS
QT INTERVAL: 438 MS
QTC CALCULATION(BAZETT): 451 MS
QTC FREDERICIA: 447 MS
R AXIS: 57 DEGREES
RBC # BLD AUTO: 3.76 X10*6/UL (ref 4–5.2)
SAO2 % BLDV: 44 % (ref 45–75)
SODIUM BLDV-SCNC: 138 MMOL/L (ref 136–145)
SODIUM SERPL-SCNC: 135 MMOL/L (ref 136–145)
SODIUM SERPL-SCNC: 139 MMOL/L (ref 136–145)
SODIUM SERPL-SCNC: 139 MMOL/L (ref 136–145)
SODIUM SERPL-SCNC: 140 MMOL/L (ref 136–145)
SODIUM UR-SCNC: <10 MMOL/L
SODIUM/CREAT UR-RTO: NORMAL
T AXIS: 58 DEGREES
T OFFSET: 428 MS
VENTRICULAR RATE: 64 BPM
WBC # BLD AUTO: 6 X10*3/UL (ref 4.4–11.3)

## 2025-01-17 PROCEDURE — 84132 ASSAY OF SERUM POTASSIUM: CPT

## 2025-01-17 PROCEDURE — 2500000005 HC RX 250 GENERAL PHARMACY W/O HCPCS

## 2025-01-17 PROCEDURE — 2500000002 HC RX 250 W HCPCS SELF ADMINISTERED DRUGS (ALT 637 FOR MEDICARE OP, ALT 636 FOR OP/ED)

## 2025-01-17 PROCEDURE — 2500000001 HC RX 250 WO HCPCS SELF ADMINISTERED DRUGS (ALT 637 FOR MEDICARE OP)

## 2025-01-17 PROCEDURE — 2500000004 HC RX 250 GENERAL PHARMACY W/ HCPCS (ALT 636 FOR OP/ED)

## 2025-01-17 PROCEDURE — 82438 ASSAY OTHER FLUID CHLORIDES: CPT

## 2025-01-17 PROCEDURE — 1100000001 HC PRIVATE ROOM DAILY

## 2025-01-17 PROCEDURE — 83735 ASSAY OF MAGNESIUM: CPT

## 2025-01-17 PROCEDURE — 74176 CT ABD & PELVIS W/O CONTRAST: CPT | Performed by: RADIOLOGY

## 2025-01-17 PROCEDURE — 87506 IADNA-DNA/RNA PROBE TQ 6-11: CPT

## 2025-01-17 PROCEDURE — 36415 COLL VENOUS BLD VENIPUNCTURE: CPT

## 2025-01-17 PROCEDURE — 74176 CT ABD & PELVIS W/O CONTRAST: CPT

## 2025-01-17 PROCEDURE — 80377 DRUG/SUBSTANCE NOS 7/MORE: CPT

## 2025-01-17 PROCEDURE — 99222 1ST HOSP IP/OBS MODERATE 55: CPT | Performed by: INTERNAL MEDICINE

## 2025-01-17 PROCEDURE — 85027 COMPLETE CBC AUTOMATED: CPT

## 2025-01-17 PROCEDURE — 99232 SBSQ HOSP IP/OBS MODERATE 35: CPT

## 2025-01-17 RX ORDER — POTASSIUM CHLORIDE 14.9 MG/ML
20 INJECTION INTRAVENOUS
Status: COMPLETED | OUTPATIENT
Start: 2025-01-17 | End: 2025-01-17

## 2025-01-17 RX ORDER — POTASSIUM CHLORIDE 20 MEQ/1
40 TABLET, EXTENDED RELEASE ORAL ONCE
Status: COMPLETED | OUTPATIENT
Start: 2025-01-17 | End: 2025-01-17

## 2025-01-17 RX ORDER — ACETAMINOPHEN 325 MG/1
650 TABLET ORAL ONCE
Status: COMPLETED | OUTPATIENT
Start: 2025-01-17 | End: 2025-01-17

## 2025-01-17 RX ORDER — POTASSIUM CHLORIDE 14.9 MG/ML
20 INJECTION INTRAVENOUS
Status: DISPENSED | OUTPATIENT
Start: 2025-01-17 | End: 2025-01-17

## 2025-01-17 RX ORDER — TOPIRAMATE 25 MG/1
75 TABLET ORAL DAILY
Status: DISCONTINUED | OUTPATIENT
Start: 2025-01-17 | End: 2025-01-18 | Stop reason: HOSPADM

## 2025-01-17 RX ADMIN — ZIPRASIDONE HYDROCHLORIDE 20 MG: 20 CAPSULE ORAL at 21:02

## 2025-01-17 RX ADMIN — BUPROPION HYDROCHLORIDE 300 MG: 150 TABLET, EXTENDED RELEASE ORAL at 08:29

## 2025-01-17 RX ADMIN — POTASSIUM CHLORIDE 20 MEQ: 200 INJECTION, SOLUTION INTRAVENOUS at 04:25

## 2025-01-17 RX ADMIN — POTASSIUM CHLORIDE 20 MEQ: 14.9 INJECTION, SOLUTION INTRAVENOUS at 12:21

## 2025-01-17 RX ADMIN — POTASSIUM CHLORIDE 20 MEQ: 200 INJECTION, SOLUTION INTRAVENOUS at 01:59

## 2025-01-17 RX ADMIN — POTASSIUM CHLORIDE 40 MEQ: 1500 TABLET, EXTENDED RELEASE ORAL at 01:59

## 2025-01-17 RX ADMIN — ACETAMINOPHEN 650 MG: 325 TABLET, FILM COATED ORAL at 01:35

## 2025-01-17 RX ADMIN — POTASSIUM CHLORIDE 20 MEQ: 200 INJECTION, SOLUTION INTRAVENOUS at 21:09

## 2025-01-17 RX ADMIN — POTASSIUM CHLORIDE 20 MEQ: 14.9 INJECTION, SOLUTION INTRAVENOUS at 08:29

## 2025-01-17 RX ADMIN — ZIPRASIDONE HYDROCHLORIDE 20 MG: 20 CAPSULE ORAL at 08:30

## 2025-01-17 RX ADMIN — LEVOTHYROXINE SODIUM 100 MCG: 100 TABLET ORAL at 08:29

## 2025-01-17 RX ADMIN — POTASSIUM CHLORIDE 40 MEQ: 1500 TABLET, EXTENDED RELEASE ORAL at 08:28

## 2025-01-17 RX ADMIN — LIOTHYRONINE SODIUM 5 MCG: 5 TABLET ORAL at 12:21

## 2025-01-17 RX ADMIN — POTASSIUM CHLORIDE 40 MEQ: 1500 TABLET, EXTENDED RELEASE ORAL at 15:02

## 2025-01-17 RX ADMIN — POTASSIUM PHOSPHATE, MONOBASIC AND POTASSIUM PHOSPHATE, DIBASIC 21 MMOL: 224; 236 INJECTION, SOLUTION, CONCENTRATE INTRAVENOUS at 23:36

## 2025-01-17 RX ADMIN — POTASSIUM CHLORIDE 40 MEQ: 1500 TABLET, EXTENDED RELEASE ORAL at 21:02

## 2025-01-17 RX ADMIN — POTASSIUM PHOSPHATE, MONOBASIC AND POTASSIUM PHOSPHATE, DIBASIC 21 MMOL: 224; 236 INJECTION, SOLUTION, CONCENTRATE INTRAVENOUS at 15:02

## 2025-01-17 RX ADMIN — TOPIRAMATE 75 MG: 25 TABLET, FILM COATED ORAL at 21:02

## 2025-01-17 RX ADMIN — VILAZODONE HYDROCHLORIDE 40 MG: 20 TABLET ORAL at 08:30

## 2025-01-17 RX ADMIN — LIOTHYRONINE SODIUM 5 MCG: 5 TABLET ORAL at 08:29

## 2025-01-17 ASSESSMENT — COGNITIVE AND FUNCTIONAL STATUS - GENERAL
MOBILITY SCORE: 24
DAILY ACTIVITIY SCORE: 24

## 2025-01-17 ASSESSMENT — ENCOUNTER SYMPTOMS
NAUSEA: 1
VOMITING: 1
ENDOCRINE COMMENTS: AS ABOVE
UNEXPECTED WEIGHT CHANGE: 1

## 2025-01-17 ASSESSMENT — ACTIVITIES OF DAILY LIVING (ADL): LACK_OF_TRANSPORTATION: NO

## 2025-01-17 ASSESSMENT — PAIN SCALES - GENERAL: PAINLEVEL_OUTOF10: 0 - NO PAIN

## 2025-01-17 NOTE — CONSULTS
Inpatient consult to Endocrinology  Consult performed by: Mark Loyola MD  Consult ordered by: Wilfredo Manriquez MD      Reason For Consult  Hypoglycemia    History Of Present Illness  Sienna Acuña is a 49 y.o. female admitted yesterday for hypokalemia.     History of reactive hypoglycemia x6-12 months  Symptomatic below 50 mg/dl  Frequent lab draws in the 60s without symptoms  Symptoms respond to food but may take 1-2h to resolve    Acarbose started in the past month, taken before most meals  She has had fewer events     Intentional eight loss 12 lbs in the past  month.    Metformin did not help    Dr. Roland had discussed Ozempic but patient is wary of trying it.     No history of gastric bypass or small intestine surgery.    History of sigmoid colectomy.    Recent hypokalemia  Seen by nephrology consultant at [facility] for renal dysfunction, attributed to recent emesis due to stress in the past 6 months    No history of intentional vomiting or eating disorder    History of exercise and competitive fitness.  No supplements aside from creatine.  No insulin injection.    Family history of diabetes, brother.  Uncertain if he has type 1 or 2, but he is currently disabled by a stroke.      Hypothyroidism  Levothyroxine and liothyronine    Osteopenia.  Denosumab q6 months    Patient follows with Dr. GEORGIE Roland at [facility]    A1c 5% (5/30/24)      Past Medical History  She has a past medical history of Abnormal CT of thoracic spine (03/31/2017), Abnormal platelet function test (Multi) (01/28/2014), Anemia, Anxiety, Baker's cyst, Baker's cyst of knee, left, Blood in stool (04/16/2015), Bright red blood per rectum (08/22/2017), Chilblain lupus, Chronic kidney disease, Clotting disorder (Multi), Depression, Disease of thyroid gland, Gastritis (01/2022), Hematuria (03/08/2016), Hypothyroidism, IBS (irritable bowel syndrome), Infection due to fungus (11/14/2024), Inflammatory bowel disease, Irritation of vulva  (2024), Left thigh pain (2014), Migraines, Muscle strain of gluteal region (2014), OAB (overactive bladder), Osteopenia, Overactive bladder, PONV (postoperative nausea and vomiting), Positive JENNIFER (antinuclear antibody), Pyelonephritis (2024), and Swelling of extremity (2024).    Surgical History  She has a past surgical history that includes  section, classic (2013); Tubal ligation (2013); Breast surgery (Bilateral); Hysterectomy (04/10/2013); CT angio abdomen pelvis w and or wo IV IV contrast (2014); Esophagogastroduodenoscopy (2022); Sigmoidectomy; Sigmoidectomy; Knee arthroscopy, medial patello femoral ligament repair (Left); Colonoscopy; Knee arthroscopy w/ debridement (Left, 10/30/2024); Knee Arthroplasty (2024);  section, low transverse (10-7-2003); Cholecystectomy; and Colon surgery.     Social History  She reports that she has never smoked. She has never used smokeless tobacco. She reports current alcohol use of about 2.0 standard drinks of alcohol per week. She reports that she does not use drugs.    Family History  Family History   Problem Relation Name Age of Onset    Diabetes Other grandparent     Other (thyroid disorder) Other grandparent     Endometriosis Other aunt     Cancer Other      Diabetes Other      Endometriosis Other      Other (thyroid disorder) Other      Blood Disorder Mother Mom     Cancer Mother Mom     Clotting disorder Mother Mom     Colon cancer Mother Mom     Arthritis Maternal Grandmother Grandma     Kidney disease Maternal Grandmother Grandma     Diabetes Paternal Grandfather Maximilian Vargas     Diabetes Brother Mando Vargas     Stroke Brother Mando Vargas         Allergies  Alendronate    Review of Systems   Constitutional:  Positive for unexpected weight change.   Eyes:  Negative for visual disturbance.   Gastrointestinal:  Positive for nausea and vomiting.   Endocrine:        As above        Physical  "Exam  Constitutional:       General: She is not in acute distress.  HENT:      Head: Normocephalic.      Mouth/Throat:      Mouth: Mucous membranes are moist.   Eyes:      Extraocular Movements: Extraocular movements intact.   Neck:      Thyroid: No thyromegaly.   Cardiovascular:      Pulses:           Radial pulses are 2+ on the right side and 2+ on the left side.   Musculoskeletal:      Right lower leg: No edema.      Left lower leg: No edema.   Neurological:      Mental Status: She is alert.      Motor: No tremor.   Psychiatric:         Mood and Affect: Affect normal.            Last Recorded Vitals  Blood pressure 101/52, pulse 77, temperature 36.5 °C (97.7 °F), temperature source Temporal, resp. rate 18, height 1.626 m (5' 4\"), weight 47.6 kg (105 lb), SpO2 100%.    Relevant Results  Results from last 7 days   Lab Units 01/17/25  0638 01/17/25  0040 01/16/25  1559 01/15/25  1630   GLUCOSE mg/dL 87 86 61* 73*      Latest Reference Range & Units 01/15/25 16:30 01/16/25 15:59 01/17/25 00:40 01/17/25 06:38   GLUCOSE 74 - 99 mg/dL 73 (L) 61 (L) 86 87   SODIUM 136 - 145 mmol/L 137 135 (L) 135 (L) 139   POTASSIUM 3.5 - 5.3 mmol/L 2.6 (LL) 3.1 (L) 2.2 (LL) 2.8 (LL)   CHLORIDE 98 - 107 mmol/L 108 (H) 110 (H) 111 (H) 110 (H)   Bicarbonate 21 - 32 mmol/L 19 (L) 17 (L) 16 (L) 22   Anion Gap 10 - 20 mmol/L 13 11 10 10   Blood Urea Nitrogen 6 - 23 mg/dL 24 (H) 22 20 17   Creatinine 0.50 - 1.05 mg/dL 1.07 (H) 1.10 (H) 1.02 1.00   EGFR >60 mL/min/1.73m*2 65 62 68 70   Calcium 8.6 - 10.3 mg/dL 8.9 9.1 7.7 (L) 7.2 (L)      Latest Reference Range & Units 01/16/25 22:19   C-Peptide 0.7 - 3.9 ng/mL 13.0 (H)   Insulin 3 - 25 uIU/mL 66 (H)       Assessment/Plan   Assessment & Plan  Metabolic acidosis    Hypokalemia      REACTIVE HYPOGLYCEMIA  History of symptoms with glucose <55 mg/dl and response to food  No such reactions during this hospitalization  Asymptomatic glucose in the 60s is not pathologic  Elevated measured c-peptide and " insulin, in the absence of concurrent glucose result, does not indicate insulinoma.   Hyperinsulinemia may represent insulin resistance  Family history of diabetes, but she has no current evidence of diabetes or prediabetes.  Some response to acarbose      RECOMMENDATIONS  No indication for 72h fasting test or pancreatic imaging  If she has witnessed hypoglycemia <55 mg/dl with symptoms, it would then be valuable to draw glucose, insulin, c-peptide, proinsulin, beta-hydroxybutyrate, sulfonylurea screen    Discussed mechanism of acarbose to decrease absorption of starches and complex carbohydrates and reduce postprandial insulin demand  Discussed side effect of flatulence  Advised in event of hypoglycemia while taking acarbose, it must be treated with simple sugar (e.g., glucose tabs or gel) as she will not absorb starches.  Acarbose is not FDA-approved for hypoglycemia    Ozempic also not FDA-approved for hypoglycemia, but it too has theoretical benefits.      Follow up with Dr. GEORGIE Roland at [facility].         Mark Loyola MD

## 2025-01-17 NOTE — CONSULTS
"Nutrition Initial Assessment:   Nutrition Assessment    Reason for Assessment: Admission nursing screening (MST=2; Unintentional weight loss, Poor PO intake)    Patient is a 49 y.o. female presenting with hypokalemia after having N/V/D since leaving for a cruise x2 weeks ago.     Hypokalemia felt to be 2/2 renal tubular acidosis given pts h/o chilblain lupus. Nephrology consult pending. Additionally, endocrinology consulted for glucosuria.     PMH: Hypothyroidism, CDK, Clotting disorder, Anxiety and Chilblain lupus     Nutrition History:  Food and Nutrient History: Visit made, pt sitting up in bed. Reports that prior to x2 weeks ago she was at her baseline w/ no nutritional concerns. Pt normally consumes x3 meals/day + x2 snacks. Notes she remains very active as she recently was training for a body building competition. Reports N/V/D started ~2 weeks ago so she had no appetite. Was relying on high caloric foods to try to get some nutrition during that timeframe. Pt also drinking 1-2 cartons Core Protein drinks daily. Is amenable to Ensure Clear BID & daily Magic Cup. No additional needs noted at this time.  Vitamin/Herbal Supplement Use: Denies  Food Allergies/Intolerances:  None  GI Symptoms: Diarrhea, Nausea, and Vomiting  Oral Problems: None     Anthropometrics:  Height: 162.6 cm (5' 4\")   Weight: 47.6 kg (105 lb)   BMI (Calculated): 18.01  IBW/kg (Dietitian Calculated): 54.5 kg  Percent of IBW: 87 %     Weight History:   Wt Readings from Last 60 Encounters:   01/16/25 47.6 kg (105 lb) --> Admit wt, Standing scale   12/11/24 54.4 kg (120 lb)   11/19/24 56.7 kg (125 lb)   10/30/24 55.3 kg (121 lb 14.6 oz)   07/15/24 55.3 kg (122 lb)   02/09/24 54.9 kg (121 lb)   12/12/23 55.4 kg (122 lb 2.2 oz)     Weight Change %:  Weight History / % Weight Change: 13% x 1 month  Significant Weight Loss: Yes  Interpretation of Weight Loss: >5% in 1 month    Nutrition Focused Physical Exam Findings:  Subcutaneous Fat Loss: "   Orbital Fat Pads: Well nourished (slightly bulging fat pads)  Buccal Fat Pads: Well nourished (full, rounded cheeks)  Triceps: Well nourished (ample fat tissue)  Muscle Wasting:  Temporalis: Mild-Moderate (slight depression)  Pectoralis (Clavicular Region): Mild-Moderate (some protrusion of clavicle)  Deltoid/Trapezius: Mild-Moderate (slight protrusion of acromion process)  Interosseous: Mild-Moderate (slightly depressed area between thumb and forefinger)  Edema:  Edema: none  Physical Findings:  Hair: Negative  Eyes: Negative  Mouth: Negative  Nails: Negative  Skin: Negative (Intact)    Nutrition Significant Labs:  BMP Trend:   Results from last 7 days   Lab Units 01/17/25  0638 01/17/25  0040 01/16/25  1559 01/15/25  1630   GLUCOSE mg/dL 87 86 61* 73*   CALCIUM mg/dL 7.2* 7.7* 9.1 8.9   SODIUM mmol/L 139 135* 135* 137   POTASSIUM mmol/L 2.8* 2.2* 3.1* 2.6*   CO2 mmol/L 22 16* 17* 19*   CHLORIDE mmol/L 110* 111* 110* 108*   BUN mg/dL 17 20 22 24*   CREATININE mg/dL 1.00 1.02 1.10* 1.07*      Nutrition Specific Medications:  Scheduled medications  [Held by provider] acarbose, 25 mg, oral, TID  estradiol, 1 g, vaginal, Once per day on Monday Thursday  levothyroxine, 100 mcg, oral, Daily  liothyronine, 5 mcg, oral, BID  potassium chloride, 20 mEq, intravenous, q2h  potassium phosphate, 21 mmol, intravenous, Once    I/O:   No recorded BM since admission.    Dietary Orders (From admission, onward)       Start     Ordered    01/16/25 2214  Adult diet Regular  Diet effective now        Question:  Diet type  Answer:  Regular    01/16/25 2214 01/16/25 2115  May Participate in Room Service  ( ROOM SERVICE MAY PARTICIPATE)  Once        Question:  .  Answer:  Yes    01/16/25 2114              Estimated Needs:   Total Energy Estimated Needs (kCal):  (1364-0202)  Method for Estimating Needs: 30-33 kcals/kg x admit wt (47.6 kg)  Total Protein Estimated Needs (g):  (55-65)  Method for Estimating Needs: 1.2-1.4 g/kg x admit  wt (47.6 kg)  Total Fluid Estimated Needs (mL):  (0951-7491)  Method for Estimating Needs: 1mL/kcal or per team        Nutrition Diagnosis   Malnutrition Diagnosis  Patient has Malnutrition Diagnosis: Yes  Diagnosis Status: New  Malnutrition Diagnosis: Moderate malnutrition related to acute disease or injury  As Evidenced by: reported <75% EENs for >7 days, underweight BMI of 18.0, significant 13% weight loss x 1 month, & mild-moderate muscle wasting      Nutrition Interventions/Recommendations         Nutrition Prescription:  Continue a liberalized regular diet as tolerated    Consider starting a daily MVI w/ minerals    Check vitamin D level & replete PRN    Continue to adjust anti-emetics PRN          Nutrition Interventions:   Interventions: Medical food supplement  Medical Food Supplement: Commercial beverage  Goal: Ensure Clear BID (240 kcals/8g protein each) + Magic Cup daily (290 kcals/9g protein each)    Nutrition Monitoring and Evaluation   Food/Nutrient Related History Monitoring  Monitoring and Evaluation Plan: Energy intake  Energy Intake: Estimated energy intake  Criteria: Meet >75% EENs    Time Spent (min): 60 minutes

## 2025-01-17 NOTE — PROGRESS NOTES
"Subjective   Subjective:   History Of Present Illness:  Sienna Acuña is a 49 y.o. female was seen and evaluated at bedside today. Overnight, no reported acute events. Patient is in no acute distress. Pt reports her current diarrhea is about her baseline. Denies taking any medication outside her prescriptions. She has been on Topamax for about 20 years. She reports she feels better now than she did initially. She has no new complaints but is concerned about what is causing her current issues.      12-point ROS performed, negative aside from noted above     Objective   Objective:     BP 93/50 (BP Location: Right arm, Patient Position: Sitting)   Pulse 79   Temp 36.5 °C (97.7 °F) (Temporal)   Resp 16   Ht 1.626 m (5' 4\")   Wt 47.6 kg (105 lb)   SpO2 100%   BMI 18.02 kg/m²     Physical Exam  Constitutional:       General: She is not in acute distress.     Appearance: Normal appearance.   HENT:      Head: Normocephalic and atraumatic.      Mouth/Throat:      Mouth: Mucous membranes are moist.   Eyes:      Conjunctiva/sclera: Conjunctivae normal.   Cardiovascular:      Rate and Rhythm: Normal rate and regular rhythm.      Pulses: Normal pulses.      Heart sounds: Normal heart sounds. No murmur heard.  Pulmonary:      Effort: Pulmonary effort is normal. No respiratory distress.      Breath sounds: Normal breath sounds.   Abdominal:      General: Abdomen is flat. There is no distension.      Palpations: Abdomen is soft.      Tenderness: There is no abdominal tenderness.   Musculoskeletal:      Cervical back: Neck supple.      Right lower leg: No edema.      Left lower leg: No edema.   Skin:     General: Skin is warm and dry.   Neurological:      General: No focal deficit present.      Mental Status: She is alert and oriented to person, place, and time.         Lab Work:     Lab Results   Component Value Date    WBC 6.0 01/17/2025    HGB 12.3 01/17/2025    HCT 33.6 (L) 01/17/2025    MCV 89 01/17/2025     " 01/17/2025     Lab Results   Component Value Date    GLUCOSE 87 01/17/2025    CALCIUM 7.2 (L) 01/17/2025     01/17/2025    K 2.8 (LL) 01/17/2025    CO2 22 01/17/2025     (H) 01/17/2025    BUN 17 01/17/2025    CREATININE 1.00 01/17/2025     Hemoglobin A1C   Date Value Ref Range Status   05/30/2024 5.0 4.7 - 6.4 % Final     Comment:     Interpretation:     Standardized A1c  Good control or normal:  4-6% ( mg/dL avg)  Moderate control:        6.1-8.0% (120-180 mg/dL avg)  Poor control:            >8.0% (180 mg/dL avg)  With 4% as a baseline, each 1% increase = 30 mg/dL increase in average   glucose.  Taken from DCCT (Diabetes Control Complications Trial)     TSH   Date Value Ref Range Status   08/07/2023 2.78 0.44 - 3.98 mIU/L Final     Comment:      TSH testing is performed using different testing    methodology at [facility] than at other    [facility]. Direct result comparisons should    only be made within the same method.       Thyroid Stimulating Hormone   Date Value Ref Range Status   01/16/2025 0.87 0.44 - 3.98 mIU/L Final   01/08/2025 0.58 0.44 - 3.98 mIU/L Final     Vitamin D, 25-Hydroxy, Total   Date Value Ref Range Status   01/16/2025 39 30 - 100 ng/mL Final     Vitamin D, 25-Hydroxy   Date Value Ref Range Status   08/07/2023 56 ng/mL Final     Comment:     .  DEFICIENCY:         < 20   NG/ML  INSUFFICIENCY:      20-29  NG/ML  SUFFICIENCY:         NG/ML    THIS ASSAY ACCURATELY QUANTIFIES THE SUM OF  VITAMIN D3, 25-HYDROXY AND VIT D2,25-HYDROXY.     06/12/2020 51 ng/mL Final     Comment:     .  DEFICIENCY:         < 20   NG/ML  INSUFFICIENCY:      20-29  NG/ML  SUFFICIENCY:         NG/ML    THIS ASSAY ACCURATELY QUANTIFIES THE SUM OF  VITAMIN D3, 25-HYDROXY AND VIT D2,25-HYDROXY.       Cultures:   No results found for the last 90 days.    Images:     CT head wo IV contrast   Final Result   No CT evidence for acute intracranial pathology.        Signed by:  Devyn East 1/16/2025 4:55 PM   Dictation workstation:   [id number]         Medications:     Scheduled:  [Held by provider] acarbose, 25 mg, oral, TID  buPROPion XL, 300 mg, oral, q AM  estradiol, 1 g, vaginal, Once per day on Monday Thursday  levothyroxine, 100 mcg, oral, Daily  liothyronine, 5 mcg, oral, BID  potassium chloride, 20 mEq, intravenous, q2h  potassium phosphate, 21 mmol, intravenous, Once  topiramate, 100 mg, oral, Daily  vilazodone, 40 mg, oral, Daily with breakfast  ziprasidone, 20 mg, oral, BID    Continuous:     PRN:  PRN medications: ondansetron     Assessment & Plan:     Sienna Acuña is a 49 y.o. female PMH of hypothyroidism, CDK, clotting disorder, anxiety and Chilblain lupus who was admitted for management of severe, refractory hypokalemia.    ACUTE ISSUES:  #Metabolic acidosis - resolved  # Refractory hypokalemia  -RTA secondary to medication use versus ongoing, chronic diarrhea  -Lower suspicion for RTA given urine anion gap negative  -Low urine potassium, sodium  -Potassium remains 2.8 on morning labs  -VBG much improved, bicarb drip stopped  PLAN:  -Nephrology consulted, appreciate recs  -Will attempt to wean off Topamax, 75mg ordered  -Continue on telemetry  -Follow-up repeat RFP after potassium repletion this morning  -Continue repleting potassium as needed  -Follow-up nephrology recommendations    #Hypoglycemia  #Glucosuria  -Insulin levels, C-peptide both elevated  - Pt does not currently take any medications that would potentially cause glucosuria though she was on acarbose at home prior to admission which could possibly contribute to hypoglycemia  -She follows w/ endocrinology outpatient  PLAN:  -Endocrinology consulted while inpt  -CTAP ordered to evaluate for potential insulinoma  -Will likely require further workup outpatient at discharge    CHRONIC ISSUES:  #Reactive hypoglycemia (no diabetes) - hold home acarbose (pt reports she has not been on Ozempic)  #Anxiety/  depression- continue home Bupropion, ziprasidone, vilazodone  # Hypothyroidism- continue home liothyronine and sythroid  #Migraines- continue home atogepant, rizatriptan and Topamax (decreased dose)  #IBS- holding home Ibsrela (for constipation, currently has diarrhea)  #Atrophic Vaginitis- continue hoem estradiol     Fluid: Replete PRN  Electrolytes: Replete PRN  Nutrition: regular  GI PPx: none  DVT/PE PPx: none  Abx: none  IV Lines: PIV  O2: room air    Disposition: Pending improved/stabilized potassium levels.    Julia Allan,   Internal Medicine PGY-1    Disclaimer: Documentation completed with the information available at the time of input. The times in the chart may not be reflective of actual patient care times, interventions, or procedures. Documentation occurs after the physical care of the patient.

## 2025-01-17 NOTE — H&P
Subjective   Subjective:   HPI:  Sienna Acuña is a 49 y.o. female with a PMH of hypothyroidism, CDK, clotting disorder, anxiety and Chilblain lupus who presented to [facility] to be admitted for hypokalemia by her PCP.     Patient has had a two week history of tachycardia, fatigue, shortness of breath and polydipsia which was precipitated by an episode of nausea, vomiting and diarrhea after a crusie 1 week ago. In that time, patient has had a 12 pound weight loss.  Outpatient workup by her PCP showed no pneumonia but did show potassium of 2.8. She was sent to [facility] on 1/10 for K repletion. She represented to her PCP on 1/15 for continued symptoms. CT angio for PE was negative however potasium had dropped to 2.6. PCP then sent her to [facility] for admission and K repletion. PCP wrote her a script for metoprolol 1/15 for heart rate control. She stated her heart rate had been in the 130s before taking the medication but it is now controlled.     Of note, patient has had a history of DANNY for which she follows with [facility]. Per chart review it was stated patient has history of hypokalemia. Workup at the time-renal US- was negative for causes of CKD. She was on Spironolactone for a few years but was recently taken off one month ago due to possible dehydration and switched to oral K 20mEq daily. UPCR was negative for nephrotic proteinuria.    At this time she is only experiencing headache weakness, fatigue and occasional nausea. Her  states she may be slurring her speech at some times. She is currently not experiencing tachycardia, chest pain or shortness of breath. She denies fevers, chills, constipation, abdominal pain and dysuria. Denies kidney stones. States diarrhea is normal for her due to IBS    Review of Systems   Constitutional:  Positive for activity change, fatigue and unexpected weight change. Negative for appetite change, chills and fever.        12 pound weight loss   Gastrointestinal:   "Positive for diarrhea and nausea.   Endocrine: Positive for polydipsia.   Genitourinary:  Negative for dysuria.   Neurological:  Positive for headaches.     ED COURSE:  Vitals:   - /65 , HR 66 , RR 17 , SpO2 100%, T 36.4C  Labs:  - WBC 9.7, HGB 15.2,   - Na 153, Cl 110, K 3.1, bicarb 17, BUN 22, Cr 1.10  - VBG: pH 7.23, pCO2 43  UA 3+ glucose, trace protein, Urine PH 6.5  Imaging:  Non contrast head CT   1/16/25  FINDINGS:  No focal mass effect or midline shift is identified. The ventricles  and sulci are symmetric and appropriate for the patient's age.    Interventions:   - D50 1 amp  -IV sodium bicarb 100mL/hr for total of 1,150 mL  -Potassium chloride 40mEq    Medications: reviewed.    Code Status: Full    ED Course:   Vitals:  /65 (BP Location: Right arm, Patient Position: Sitting)   Pulse 67   Temp 36.4 °C (97.5 °F) (Temporal)   Resp 17   Wt 47.6 kg (105 lb)   SpO2 99%     Labs:  Lab Results   Component Value Date    WBC 9.7 01/16/2025    HGB 15.2 01/16/2025    HCT 41.1 01/16/2025    MCV 91 01/16/2025     01/16/2025     Lab Results   Component Value Date    GLUCOSE 61 (L) 01/16/2025    CALCIUM 9.1 01/16/2025     (L) 01/16/2025    K 3.1 (L) 01/16/2025    CO2 17 (L) 01/16/2025     (H) 01/16/2025    BUN 22 01/16/2025    CREATININE 1.10 (H) 01/16/2025     Lab Results   Component Value Date    TROPHS 3 01/16/2025     Lab Results   Component Value Date    BNP 16 01/16/2025   No results found for: \"DDIMERVTE\"  Imaging:  CT head wo IV contrast   Final Result   No CT evidence for acute intracranial pathology.        Signed by: Devyn East 1/16/2025 4:55 PM   Dictation workstation:   [id number]         Interventions:  Medications   sodium bicarbonate 1 mEq/mL (8.4 %) 150 mEq in dextrose 5% 1,150 mL infusion (100 mL/hr intravenous New Bag 1/16/25 9711)   potassium chloride CR (Klor-Con M20) ER tablet 40 mEq (40 mEq oral Given 1/16/25 1645)   dextrose 50 % injection 25 g " (25 g intravenous Given 25 7355)       Past Medical History:  She has a past medical history of Abnormal CT of thoracic spine (2017), Abnormal platelet function test (Multi) (2014), Anemia, Anxiety, Baker's cyst, Baker's cyst of knee, left, Blood in stool (2015), Bright red blood per rectum (2017), Chilblain lupus, Chronic kidney disease, Clotting disorder (Multi), Depression, Disease of thyroid gland, Gastritis (2022), Hematuria (2016), Hypothyroidism, IBS (irritable bowel syndrome), Infection due to fungus (2024), Inflammatory bowel disease, Irritation of vulva (2024), Left thigh pain (2014), Migraines, Muscle strain of gluteal region (2014), OAB (overactive bladder), Osteopenia, Overactive bladder, PONV (postoperative nausea and vomiting), Positive JENNIFER (antinuclear antibody), Pyelonephritis (2024), and Swelling of extremity (2024).    Past Surgical History:  She has a past surgical history that includes  section, classic (2013); Tubal ligation (2013); Breast surgery (Bilateral); Hysterectomy (04/10/2013); CT angio abdomen pelvis w and or wo IV IV contrast (2014); Esophagogastroduodenoscopy (2022); Sigmoidectomy; Sigmoidectomy; Knee arthroscopy, medial patello femoral ligament repair (Left); Colonoscopy; Knee arthroscopy w/ debridement (Left, 10/30/2024); Knee Arthroplasty (2024);  section, low transverse (10-7-2003); Cholecystectomy; and Colon surgery.    Social History:  She reports that she has never smoked. She has never used smokeless tobacco. She reports current alcohol use of about 2.0 standard drinks of alcohol per week. She reports that she does not use drugs.    Family History:  Family History   Problem Relation Name Age of Onset    Diabetes Other grandparent     Other (thyroid disorder) Other grandparent     Endometriosis Other aunt     Cancer Other      Diabetes Other      Endometriosis  "Other      Other (thyroid disorder) Other      Blood Disorder Mother Mom     Cancer Mother Mom     Clotting disorder Mother Mom     Colon cancer Mother Mom     Arthritis Maternal Grandmother Grandma     Kidney disease Maternal Grandmother Grandma     Diabetes Paternal Grandfather Maximilian Vargas     Diabetes Brother Mando Vargas     Stroke Brother Mando Vargas      Allergies:  Alendronate    Home Medications:  (Not in a hospital admission)         Objective   Objective:     /65 (BP Location: Right arm, Patient Position: Sitting)   Pulse 67   Temp 36.4 °C (97.5 °F) (Temporal)   Resp 17   Ht 1.626 m (5' 4\")   Wt 47.6 kg (105 lb)   SpO2 99%   BMI 18.02 kg/m²     Physical Exam  Constitutional:       Appearance: Normal appearance.   Cardiovascular:      Rate and Rhythm: Normal rate and regular rhythm.   Pulmonary:      Effort: Pulmonary effort is normal.      Breath sounds: Normal breath sounds.   Abdominal:      General: Abdomen is flat.      Palpations: Abdomen is soft.   Musculoskeletal:      Right lower leg: No edema.      Left lower leg: No edema.   Skin:     General: Skin is warm and dry.   Neurological:      General: No focal deficit present.      Mental Status: She is alert and oriented to person, place, and time.   Psychiatric:         Mood and Affect: Mood normal.         Behavior: Behavior normal.        Lab Work:     Lab Results   Component Value Date    WBC 9.7 01/16/2025    HGB 15.2 01/16/2025    HCT 41.1 01/16/2025    MCV 91 01/16/2025     01/16/2025     Lab Results   Component Value Date    GLUCOSE 61 (L) 01/16/2025    CALCIUM 9.1 01/16/2025     (L) 01/16/2025    K 3.1 (L) 01/16/2025    CO2 17 (L) 01/16/2025     (H) 01/16/2025    BUN 22 01/16/2025    CREATININE 1.10 (H) 01/16/2025     Hemoglobin A1C   Date Value Ref Range Status   05/30/2024 5.0 4.7 - 6.4 % Final     Comment:     Interpretation:     Standardized A1c  Good control or normal:  4-6% ( mg/dL " avg)  Moderate control:        6.1-8.0% (120-180 mg/dL avg)  Poor control:            >8.0% (180 mg/dL avg)  With 4% as a baseline, each 1% increase = 30 mg/dL increase in average   glucose.  Taken from DCCT (Diabetes Control Complications Trial)     TSH   Date Value Ref Range Status   08/07/2023 2.78 0.44 - 3.98 mIU/L Final     Comment:      TSH testing is performed using different testing    methodology at [facility] than at other    [facility]. Direct result comparisons should    only be made within the same method.       Thyroid Stimulating Hormone   Date Value Ref Range Status   01/16/2025 0.87 0.44 - 3.98 mIU/L Final   01/08/2025 0.58 0.44 - 3.98 mIU/L Final     Vitamin D, 25-Hydroxy   Date Value Ref Range Status   08/07/2023 56 ng/mL Final     Comment:     .  DEFICIENCY:         < 20   NG/ML  INSUFFICIENCY:      20-29  NG/ML  SUFFICIENCY:         NG/ML    THIS ASSAY ACCURATELY QUANTIFIES THE SUM OF  VITAMIN D3, 25-HYDROXY AND VIT D2,25-HYDROXY.     06/12/2020 51 ng/mL Final     Comment:     .  DEFICIENCY:         < 20   NG/ML  INSUFFICIENCY:      20-29  NG/ML  SUFFICIENCY:         NG/ML    THIS ASSAY ACCURATELY QUANTIFIES THE SUM OF  VITAMIN D3, 25-HYDROXY AND VIT D2,25-HYDROXY.       Cultures:   No results found for the last 90 days.    Images:     CT head wo IV contrast   Final Result   No CT evidence for acute intracranial pathology.        Signed by: Devyn East 1/16/2025 4:55 PM   Dictation workstation:   [id number]         Medications:     Scheduled Meds:   Continuous Meds:  sodium bicarbonate 1 mEq/mL (8.4 %) 150 mEq in dextrose 5% 1,150 mL infusion, 100 mL/hr, Last Rate: 100 mL/hr (01/16/25 1802)      PRN Meds:       Assessment & Plan:     Sienna Turk a 49 y.o. female with a PMH of hypothyroidism, CDK, clotting disorder, anxiety and Chilblain lupus who presented to [facility] to be admitted for hypokalemia by her PCP.     ACUTE MEDICAL ISSUES:  #Metabolic  acidosis   #Hypokalemia  # RTA  distal v proximal   -persistent hypokalemia  (2.6->3.1)  -UA 3+ glucose, trace protein  -Creatinine 1.1 on admission, baseline 0.9-1.1  -urine PH 6.5  -non anion gap metabolic acidosis: VBG: pH 7.23, pCO2 43, AG 8  -Patient on Topamax which is a can be a rare cause of Fanconi syndrome leading to proximal RTA.   -Patient has autoimmune history which can cause distal RTA  PLAN  -follow up CMP at 2100 to determine K repletion  -Repeat VBG at 2100  -urine electrolytes pending- calculate urine anion gap  -order serum/ urine phos, urine amino acid, vitamin D  -continue sodium bicarb 100mL/hr for total of 1,150 mL  -consult nephrology  -tele monitoring     CHRONIC MEDICAL ISSUES:  #Reactive hypoglycemia (no diabetes) - hold home ozempic and acarbose  #Anxiety/ depression- continue home Bupropion, ziprasidone, vilazodone  # Hypothyroidism- continue home liothyronine and sythroid  #Migraines- continue home atogepant, rizatriptan and Topamax   #IBS- continue home ibsrela  #Atrophic Vaginitis- continue hoem estradiol     Fluid: Replete PRN  Electrolytes: pending 2100 CMP  Nutrition: adult regular  GI ppx: none  DVT/PE ppx: none  Abx:  none  IV Lines: PIV  O2: RA    Dispo: Pt admitted for RTA and hypokalemia. Estimated length of stay > 48 hours.    Danielle Hawkins M3    SENIOR RESIDENT ATTESTATION TO H&P             PGY-2 Attestation: I saw and evaluated the patient.  I personally obtained the key and critical portions of the history and physical exam or was physically present for key and  critical portions performed by the resident. I reviewed the resident's documentation and discussed the patient with the resident.  I agree with the documentation as detailed in the note unless stated otherwise in this attestation.     Harlan Lassiter, DO  PGY-2  Internal Medicine

## 2025-01-17 NOTE — PROGRESS NOTES
01/17/25 0905   Discharge Planning   Living Arrangements Spouse/significant other   Support Systems Spouse/significant other   Assistance Needed A&OX4; independent with ADLs with no DME; drives; room air baseline and currently room air; PCP Dr Carrol Yepez   Type of Residence Private residence   Number of Stairs to Enter Residence 4   Number of Stairs Within Residence 0   Do you have animals or pets at home? Yes   Type of Animals or Pets 1 cat   Who is requesting discharge planning? Provider   Expected Discharge Disposition Home  (Patient very independent and denies any home going needs)   Does the patient need discharge transport arranged? No   Financial Resource Strain   How hard is it for you to pay for the very basics like food, housing, medical care, and heating? Not hard   Housing Stability   In the last 12 months, was there a time when you were not able to pay the mortgage or rent on time? N   In the past 12 months, how many times have you moved where you were living? 0   At any time in the past 12 months, were you homeless or living in a shelter (including now)? N   Transportation Needs   In the past 12 months, has lack of transportation kept you from medical appointments or from getting medications? no   In the past 12 months, has lack of transportation kept you from meetings, work, or from getting things needed for daily living? No

## 2025-01-17 NOTE — HOSPITAL COURSE
Sienna Acuña  is a 49 y.o. female PMH of hypothyroidism, CDK, clotting disorder, anxiety and Chilblain lupus who was admitted for management of severe, refractory hypokalemia.     Patient was sent in from PCP office.  At PCP, was found to have potassium 2.6.  Was instructed to come in for repletion.  On arrival, was also found to have metabolic acidosis.  Nephrology was consulted, recommended starting bicarb drip.  Her metabolic acidosis resolved and patient was taken off the drip.    Per patient, she also experiences hyperglycemia followed by reactive episodes of hypoglycemia.  This been going on for a while, she is not taking acarbose recently for this.  She also reports that she has ongoing diarrhea and has had this for a while due to her IBS.  Reports her diarrhea currently is no different than normal.    Patient's potassium was repleted aggressively, phosphate also required repletion.  On labs, patient was found to have low urine potassium, low urine sodium, no urine anion gap.  She did not have anion gap on serum labs either.  It was also noted that she had glucosuria despite no history of diabetes. Per nephrology, likely that she has some potential underlying RTA secondary to Topamax use complicated by GI losses and diarrhea.  Potentially some component of dehydration with secondary hyperaldosteronism. Recommended weaning off topamax which was initiated.    Patient's insulin levels, C-peptide levels were also checked while inpatient.  These were both elevated.  Per patient, she has never been on a sulfonylurea or any other medication that would potentially increase her endogenous insulin. Endocrinology was consulted at this time. No pancreatic mass was found on CT imaging, however will potentially require further workup outpatient.

## 2025-01-17 NOTE — CARE PLAN
The patient's goals for the shift include      The clinical goals for the shift include pt will remain safe during shift

## 2025-01-18 VITALS
RESPIRATION RATE: 18 BRPM | BODY MASS INDEX: 17.93 KG/M2 | OXYGEN SATURATION: 100 % | DIASTOLIC BLOOD PRESSURE: 60 MMHG | HEART RATE: 80 BPM | WEIGHT: 105 LBS | TEMPERATURE: 97.3 F | HEIGHT: 64 IN | SYSTOLIC BLOOD PRESSURE: 104 MMHG

## 2025-01-18 PROBLEM — E87.6 HYPOKALEMIA: Status: RESOLVED | Noted: 2024-11-14 | Resolved: 2025-01-18

## 2025-01-18 PROBLEM — E87.20 METABOLIC ACIDOSIS: Status: RESOLVED | Noted: 2025-01-16 | Resolved: 2025-01-18

## 2025-01-18 LAB
ALBUMIN SERPL BCP-MCNC: 3.1 G/DL (ref 3.4–5)
ALBUMIN SERPL BCP-MCNC: 3.4 G/DL (ref 3.4–5)
ANION GAP SERPL CALC-SCNC: 10 MMOL/L (ref 10–20)
ANION GAP SERPL CALC-SCNC: 9 MMOL/L (ref 10–20)
BUN SERPL-MCNC: 11 MG/DL (ref 6–23)
BUN SERPL-MCNC: 8 MG/DL (ref 6–23)
CALCIUM SERPL-MCNC: 7.1 MG/DL (ref 8.6–10.3)
CALCIUM SERPL-MCNC: 7.5 MG/DL (ref 8.6–10.3)
CHLORIDE SERPL-SCNC: 115 MMOL/L (ref 98–107)
CHLORIDE SERPL-SCNC: 115 MMOL/L (ref 98–107)
CO2 SERPL-SCNC: 16 MMOL/L (ref 21–32)
CO2 SERPL-SCNC: 19 MMOL/L (ref 21–32)
CREAT SERPL-MCNC: 0.89 MG/DL (ref 0.5–1.05)
CREAT SERPL-MCNC: 0.9 MG/DL (ref 0.5–1.05)
EGFRCR SERPLBLD CKD-EPI 2021: 80 ML/MIN/1.73M*2
EGFRCR SERPLBLD CKD-EPI 2021: 81 ML/MIN/1.73M*2
ERYTHROCYTE [DISTWIDTH] IN BLOOD BY AUTOMATED COUNT: 14.6 % (ref 11.5–14.5)
EST. AVERAGE GLUCOSE BLD GHB EST-MCNC: 77 MG/DL
GLUCOSE SERPL-MCNC: 117 MG/DL (ref 74–99)
GLUCOSE SERPL-MCNC: 86 MG/DL (ref 74–99)
HBA1C MFR BLD: 4.3 %
HCT VFR BLD AUTO: 33.5 % (ref 36–46)
HGB BLD-MCNC: 12.1 G/DL (ref 12–16)
MAGNESIUM SERPL-MCNC: 1.95 MG/DL (ref 1.6–2.4)
MCH RBC QN AUTO: 33.3 PG (ref 26–34)
MCHC RBC AUTO-ENTMCNC: 36.1 G/DL (ref 32–36)
MCV RBC AUTO: 92 FL (ref 80–100)
NRBC BLD-RTO: 0 /100 WBCS (ref 0–0)
PHOSPHATE SERPL-MCNC: 1.8 MG/DL (ref 2.5–4.9)
PHOSPHATE SERPL-MCNC: 2.2 MG/DL (ref 2.5–4.9)
PLATELET # BLD AUTO: 281 X10*3/UL (ref 150–450)
POTASSIUM SERPL-SCNC: 3.1 MMOL/L (ref 3.5–5.3)
POTASSIUM SERPL-SCNC: 3.5 MMOL/L (ref 3.5–5.3)
RBC # BLD AUTO: 3.63 X10*6/UL (ref 4–5.2)
SODIUM SERPL-SCNC: 138 MMOL/L (ref 136–145)
SODIUM SERPL-SCNC: 139 MMOL/L (ref 136–145)
WBC # BLD AUTO: 6.5 X10*3/UL (ref 4.4–11.3)

## 2025-01-18 PROCEDURE — 2500000005 HC RX 250 GENERAL PHARMACY W/O HCPCS

## 2025-01-18 PROCEDURE — 83735 ASSAY OF MAGNESIUM: CPT

## 2025-01-18 PROCEDURE — 80069 RENAL FUNCTION PANEL: CPT

## 2025-01-18 PROCEDURE — 2500000002 HC RX 250 W HCPCS SELF ADMINISTERED DRUGS (ALT 637 FOR MEDICARE OP, ALT 636 FOR OP/ED)

## 2025-01-18 PROCEDURE — 99239 HOSP IP/OBS DSCHRG MGMT >30: CPT

## 2025-01-18 PROCEDURE — 2500000004 HC RX 250 GENERAL PHARMACY W/ HCPCS (ALT 636 FOR OP/ED)

## 2025-01-18 PROCEDURE — 83036 HEMOGLOBIN GLYCOSYLATED A1C: CPT | Mod: GEALAB

## 2025-01-18 PROCEDURE — 2500000001 HC RX 250 WO HCPCS SELF ADMINISTERED DRUGS (ALT 637 FOR MEDICARE OP)

## 2025-01-18 PROCEDURE — 36415 COLL VENOUS BLD VENIPUNCTURE: CPT

## 2025-01-18 PROCEDURE — 85027 COMPLETE CBC AUTOMATED: CPT

## 2025-01-18 RX ORDER — TOPIRAMATE 50 MG/1
50 TABLET, FILM COATED ORAL DAILY
Qty: 30 TABLET | Refills: 0 | Status: SHIPPED | OUTPATIENT
Start: 2025-01-18 | End: 2025-01-18 | Stop reason: HOSPADM

## 2025-01-18 RX ORDER — POTASSIUM CHLORIDE 20 MEQ/1
40 TABLET, EXTENDED RELEASE ORAL ONCE
Status: COMPLETED | OUTPATIENT
Start: 2025-01-18 | End: 2025-01-18

## 2025-01-18 RX ORDER — TOPIRAMATE 25 MG/1
75 TABLET ORAL DAILY
Qty: 90 TABLET | Refills: 0 | Status: SHIPPED | OUTPATIENT
Start: 2025-01-18 | End: 2025-02-17

## 2025-01-18 RX ADMIN — ZIPRASIDONE HYDROCHLORIDE 20 MG: 20 CAPSULE ORAL at 08:43

## 2025-01-18 RX ADMIN — LEVOTHYROXINE SODIUM 100 MCG: 100 TABLET ORAL at 08:43

## 2025-01-18 RX ADMIN — POTASSIUM CHLORIDE 40 MEQ: 1500 TABLET, EXTENDED RELEASE ORAL at 00:50

## 2025-01-18 RX ADMIN — LIOTHYRONINE SODIUM 5 MCG: 5 TABLET ORAL at 13:23

## 2025-01-18 RX ADMIN — VILAZODONE HYDROCHLORIDE 40 MG: 20 TABLET ORAL at 08:43

## 2025-01-18 RX ADMIN — POTASSIUM PHOSPHATE, MONOBASIC AND POTASSIUM PHOSPHATE, DIBASIC 15 MMOL: 224; 236 INJECTION, SOLUTION, CONCENTRATE INTRAVENOUS at 13:19

## 2025-01-18 RX ADMIN — LIOTHYRONINE SODIUM 5 MCG: 5 TABLET ORAL at 08:43

## 2025-01-18 RX ADMIN — BUPROPION HYDROCHLORIDE 300 MG: 150 TABLET, EXTENDED RELEASE ORAL at 08:43

## 2025-01-18 RX ADMIN — POTASSIUM CHLORIDE 40 MEQ: 1500 TABLET, EXTENDED RELEASE ORAL at 08:43

## 2025-01-18 ASSESSMENT — COGNITIVE AND FUNCTIONAL STATUS - GENERAL
MOBILITY SCORE: 24
DAILY ACTIVITIY SCORE: 24
DAILY ACTIVITIY SCORE: 24
MOBILITY SCORE: 24

## 2025-01-18 ASSESSMENT — PAIN SCALES - GENERAL
PAINLEVEL_OUTOF10: 0 - NO PAIN
PAINLEVEL_OUTOF10: 0 - NO PAIN

## 2025-01-18 NOTE — DISCHARGE SUMMARY
Discharge Diagnosis  Metabolic acidosis    Issues Requiring Follow-Up  Hypokalemia  Metabolic acidosis    Discharge Meds     Medication List      CHANGE how you take these medications     diclofenac epolamine 1.3 % patch; APPLY 1  TOPICALLY TO SKIN ONCE DAILY;   What changed: See the new instructions.   potassium chloride CR 20 mEq ER tablet; Commonly known as: Klor-Con M20;   Take 1 tablet (20 mEq) by mouth 2 times a day. Do not crush or chew.; What   changed: Another medication with the same name was removed. Continue   taking this medication, and follow the directions you see here.   topiramate 25 mg tablet; Commonly known as: Topamax; Take 3 tablets (75   mg) by mouth once daily.; What changed: medication strength, how much to   take     CONTINUE taking these medications     buPROPion  mg 24 hr tablet; Commonly known as: Wellbutrin XL   cholecalciferol 50,000 unit capsule; Commonly known as: Vitamin D-3   estradiol 0.01 % (0.1 mg/gram) vaginal cream; Commonly known as:   Estrace; Apply pea-sized amount of cream to affected area twice a week.   Ibsrela 50 mg tablet tablet; Generic drug: tenapanor   liothyronine 5 mcg tablet; Commonly known as: Cytomel   metoprolol succinate XL 25 mg 24 hr tablet; Commonly known as:   Toprol-XL; Take 1 tablet (25 mg) by mouth once daily. Do not crush or   chew.   Nurtec ODT 75 mg tablet,disintegrating; Generic drug: rimegepant   ondansetron 4 mg tablet; Commonly known as: Zofran   QULIPTA ORAL   rizatriptan 10 mg tablet; Commonly known as: Maxalt   Synthroid 88 mcg tablet; Generic drug: levothyroxine   vilazodone 40 mg tablet; Commonly known as: Viibryd   ziprasidone 20 mg capsule; Commonly known as: Geodon     STOP taking these medications     acarbose 25 mg tablet; Commonly known as: Precose   Miralax 17 gram/dose powder; Generic drug: polyethylene glycol   Ozempic 0.25 mg or 0.5 mg (2 mg/3 mL) pen injector; Generic drug:   semaglutide       Test Results Pending At  Discharge  Pending Labs       Order Current Status    Amino acids, urine, quantitative In process    Electrolytes, stool In process    Hemoglobin A1c In process    Stool Pathogen Panel, PCR In process    Sulfonylurea Hypoglycemics,Serum In process            Hospital Course  Sienna Acuña  is a 49 y.o. female PMH of hypothyroidism, CDK, clotting disorder, anxiety and Chilblain lupus who was admitted for management of severe, refractory hypokalemia.     Patient was sent in from PCP office.  At PCP, was found to have potassium 2.6.  Was instructed to come in for repletion.  On arrival, was also found to have metabolic acidosis.  Nephrology was consulted, recommended starting bicarb drip.  Her metabolic acidosis resolved and patient was taken off the drip.    Per patient, she also experiences hyperglycemia followed by reactive episodes of hypoglycemia.  This been going on for a while, she is not taking acarbose recently for this.  She also reports that she has ongoing diarrhea and has had this for a while due to her IBS.  Reports her diarrhea currently is no different than normal.    Patient's potassium was repleted aggressively, phosphate also required repletion.  On labs, patient was found to have low urine potassium, low urine sodium, no urine anion gap.  She did not have anion gap on serum labs either.  It was also noted that she had glucosuria despite no history of diabetes. Per nephrology, likely that she has some potential underlying RTA secondary to Topamax use complicated by GI losses and diarrhea.  Potentially some component of dehydration with secondary hyperaldosteronism. Recommended weaning off topamax which was initiated.    Patient's insulin levels, C-peptide levels were also checked while inpatient.  These were both elevated.  Per patient, she has never been on a sulfonylurea or any other medication that would potentially increase her endogenous insulin. Endocrinology was consulted at this time. No  pancreatic mass was found on CT imaging, however will potentially require further workup outpatient.      Patient was hemodynamically stable for discharge on 1/18/2025 with instructions to continue taking reduced dose of Topamax 75 mg, stop taking acarbose due to risk of developing diarrhea, and follow-up with endocrinology and GI teams 1 to 2 weeks after hospitalization.    Pertinent Physical Exam At Time of Discharge  Physical Exam  Constitutional:       General: She is not in acute distress.     Appearance: Normal appearance.   HENT:      Head: Normocephalic and atraumatic.      Nose: Nose normal.      Mouth/Throat:      Mouth: Mucous membranes are moist.   Eyes:      Conjunctiva/sclera: Conjunctivae normal.      Pupils: Pupils are equal, round, and reactive to light.   Cardiovascular:      Rate and Rhythm: Normal rate and regular rhythm.      Heart sounds: Normal heart sounds.   Pulmonary:      Effort: Pulmonary effort is normal. No respiratory distress.      Breath sounds: Normal breath sounds.   Abdominal:      General: Bowel sounds are normal. There is no distension.      Palpations: Abdomen is soft.      Tenderness: There is no abdominal tenderness.   Musculoskeletal:         General: No swelling.      Right lower leg: No edema.      Left lower leg: No edema.   Skin:     General: Skin is warm and dry.      Coloration: Skin is not jaundiced.   Neurological:      General: No focal deficit present.      Mental Status: She is alert. Mental status is at baseline.         Outpatient Follow-Up  Future Appointments   Date Time Provider Department Center   3/24/2025 10:00 AM Rito Giron MD [id number] [facility]   5/19/2025  3:00 PM Carrol Yepez DO [id number] [facility]   10/17/2025  9:20 AM Angel Morrow MD [id number] [facility]         Yosi Romero DO

## 2025-01-18 NOTE — CARE PLAN
Problem: Pain - Adult  Goal: Verbalizes/displays adequate comfort level or baseline comfort level  Outcome: Progressing     Problem: Discharge Planning  Goal: Discharge to home or other facility with appropriate resources  Outcome: Progressing     Problem: Chronic Conditions and Co-morbidities  Goal: Patient's chronic conditions and co-morbidity symptoms are monitored and maintained or improved  Outcome: Progressing     Problem: Safety - Adult  Goal: Free from fall injury  Outcome: Progressing   The patient's goals for the shift include  free from burning in arm overnight     The clinical goals for the shift include patient will have decrease in burning from potassium replacements

## 2025-01-18 NOTE — PROGRESS NOTES
01/18/25 1344   Discharge Planning   Expected Discharge Disposition Home  (medically ready for d/c, continues to deny any [facility] needs.)        Attending Attestation (For Attendings USE Only)...

## 2025-01-18 NOTE — CONSULTS
Reason For Consult  HypoK    History Of Present Illness  Sienna Acuña is a 49 y.o. female presenting with hypokalemia.  49-year-old with history of migraines and some depression on multiple medications recently returned from a cruise that was complicated by some GI issues.  I was feeling poorly on return from the cruise with a variety of symptoms so she has been seeing her PCP who had labs drawn that showed severe hypokalemia.  She has had chronic issues with hypokalemia and has been seeing a nephrologist for this.  Also has had intermittent issues with elevated serum creatinine that tend to improve without specific intervention.  Has been on potassium supplements as well as spironolactone.  Looks like she has been told that Topamax can do this but previous notes indicate that she is unable to wean that down.  Potassium previously has been mildly abnormal at 3.3-3.4 range.  On presentation her potassium here was 2.2.  Evaluation shows low urine potassium at 7.  She has associated metabolic acidosis.  As noted she is on Topamax.  No hypertension     Past Medical History  She has a past medical history of Abnormal CT of thoracic spine (03/31/2017), Abnormal platelet function test (Multi) (01/28/2014), Anemia, Anxiety, Baker's cyst, Baker's cyst of knee, left, Blood in stool (04/16/2015), Bright red blood per rectum (08/22/2017), Chilblain lupus, Chronic kidney disease, Clotting disorder (Multi), Depression, Disease of thyroid gland, Gastritis (01/2022), Hematuria (03/08/2016), Hypothyroidism, IBS (irritable bowel syndrome), Infection due to fungus (11/14/2024), Inflammatory bowel disease, Irritation of vulva (11/14/2024), Left thigh pain (11/20/2014), Migraines, Muscle strain of gluteal region (12/05/2014), OAB (overactive bladder), Osteopenia, Overactive bladder, PONV (postoperative nausea and vomiting), Positive JENNIFER (antinuclear antibody), Pyelonephritis (11/14/2024), and Swelling of extremity  (2024).    Surgical History  She has a past surgical history that includes  section, classic (2013); Tubal ligation (2013); Breast surgery (Bilateral); Hysterectomy (04/10/2013); CT angio abdomen pelvis w and or wo IV IV contrast (2014); Esophagogastroduodenoscopy (2022); Sigmoidectomy; Sigmoidectomy; Knee arthroscopy, medial patello femoral ligament repair (Left); Colonoscopy; Knee arthroscopy w/ debridement (Left, 10/30/2024); Knee Arthroplasty (2024);  section, low transverse (10-7-2003); Cholecystectomy; and Colon surgery.     Social History  She reports that she has never smoked. She has never used smokeless tobacco. She reports current alcohol use of about 2.0 standard drinks of alcohol per week. She reports that she does not use drugs.    Family History  Family History   Problem Relation Name Age of Onset    Diabetes Other grandparent     Other (thyroid disorder) Other grandparent     Endometriosis Other aunt     Cancer Other      Diabetes Other      Endometriosis Other      Other (thyroid disorder) Other      Blood Disorder Mother Mom     Cancer Mother Mom     Clotting disorder Mother Mom     Colon cancer Mother Mom     Arthritis Maternal Grandmother Grandma     Kidney disease Maternal Grandmother Grandma     Diabetes Paternal Grandfather Maximilian Vargas     Diabetes Brother Mando Vargas     Stroke Brother Mando Vargas         Allergies  Alendronate         Physical Exam  NAD.  Awake alert answering questions appropriately.  Does not appear chronically ill or frail         I&O 24HR    Intake/Output Summary (Last 24 hours) at 2025 0629  Last data filed at 2025 0539  Gross per 24 hour   Intake 1140 ml   Output --   Net 1140 ml       Vitals 24HR  Heart Rate:  [66-86]   Temp:  [36.1 °C (97 °F)-36.5 °C (97.7 °F)]   Resp:  [16-18]   BP: ()/(50-66)   SpO2:  [98 %-100 %]        Assessment/Plan       49-year-old with severe hypokalemia with history of  mild chronic hypokalemia in the setting of Topamax use  Hypokalemia.  Severe.  Improving.    -Evaluation seems to have been a bit of a mixed picture.  She has been having some GI issues and urine potassium is low which would suggest that this is all GI related.  However her hypokalemia seems out of proportion to her acidosis.  -I do not see anything obviously concerning.  Blood pressure is not elevated so I do not think we need to worry about hyperaldosteronism  -Discussed with primary team.  I suggested neuroevaluation to start weaning down the Topamax.  Continue supportive care and aggressive KCl repletion    Assessment & Plan  Metabolic acidosis    Hypokalemia          Nancy Alcaraz MD

## 2025-01-18 NOTE — DISCHARGE INSTRUCTIONS
1) Please, take your home medications as instructed after being discharged from the hospital.    NEW MEDICATION CHANGES:  Stop taking acarbose  Start taking reduced dose of Topamax 75 mg    2) Please, follow-up with your primary care provider within 7 to 14 days after leaving the hospital. /appointment services has been requested to make an appointment for you, however if you do not hear back from them within 1 to 2 days, please call your primary physician's office to schedule an appointment. Bring your photo ID and insurance card to your appointment.   [facility]  services can be reached at [phone].    - Please, call   or appointment services and schedule a follow-up with your PCP, endocrinology, and GI within 1-2 weeks after you leave the hospital.  - Please, submit repeat blood work in 1 week to monitor potassium and phosphorus levels    3) If you experience any worsening symptoms or have any concerns, please contact your primary care provider to schedule an appointment. If you cannot get in touch with your primary care physician, please return to the nearest emergency room or urgent care clinic for an evaluation and treatment.    Thank you for choosing [facility] and allowing us to partake in your medical care!    - Your [facility] inpatient primary care team.

## 2025-01-20 ENCOUNTER — PATIENT OUTREACH (OUTPATIENT)
Dept: PRIMARY CARE | Facility: CLINIC | Age: 50
End: 2025-01-20

## 2025-01-20 ENCOUNTER — APPOINTMENT (OUTPATIENT)
Dept: PRIMARY CARE | Facility: CLINIC | Age: 50
End: 2025-01-20
Payer: COMMERCIAL

## 2025-01-20 NOTE — PROGRESS NOTES
Discharge Facility: [facility]  Discharge Diagnosis: Hypokalemia  Admission Date: 16 Jan 25  Discharge Date: 18 Jan 25    PCP Appointment Date: 24 Jan 25  Specialist Appointment Date: 21 Jan 25 (Hem OncConnor)  Hospital Encounter and Summary Linked: Yes    See discharge assessment below for further details     Engagement  Call Start Time: 1108 (Spoke with patient) (1/20/2025 11:18 AM)    Medications  Medications reviewed with patient/caregiver?: Yes (1/20/2025 11:18 AM)  Is the patient having any side effects they believe may be caused by any medication additions or changes?: No (1/20/2025 11:18 AM)  Does the patient have all medications ordered at discharge?: Yes (1/20/2025 11:18 AM)  Prescription Comments: pt able to obtain and afford all medications (1/20/2025 11:18 AM)  Is the patient taking all medications as directed (includes completed medication regime)?: Yes (1/20/2025 11:18 AM)  Medication Comments: no questions on medications. (1/20/2025 11:18 AM)    Appointments  Does the patient have a primary care provider?: Yes (follow up set by another for 24 Jan 25) (1/20/2025 11:18 AM)  Care Management Interventions: Verified appointment date/time/provider (1/20/2025 11:18 AM)  Has the patient kept scheduled appointments due by today?: Yes (1/20/2025 11:18 AM)  Care Management Interventions: Advised patient to keep appointment (1/20/2025 11:18 AM)    Self Management  What is the home health agency?: None (1/20/2025 11:18 AM)  Has home health visited the patient within 72 hours of discharge?: Not applicable (1/20/2025 11:18 AM)  What Durable Medical Equipment (DME) was ordered?: None (1/20/2025 11:18 AM)    Patient Teaching  Does the patient have access to their discharge instructions?: Yes (1/20/2025 11:18 AM)  Care Management Interventions: Reviewed instructions with patient (1/20/2025 11:18 AM)  What is the patient's perception of their health status since discharge?: Improving (1/20/2025 11:18 AM)  Is the  patient/caregiver able to teach back the hierarchy of who to call/visit for symptoms/problems? PCP, Specialist, Home Health nurse, Urgent Care, ED, 911: Yes (1/20/2025 11:18 AM)  Patient/Caregiver Education Comments: None (1/20/2025 11:18 AM)    Wrap Up  Wrap Up Additional Comments: Pt stating she is doing well since her discharge, feeling much better. pt has no questions on medications changes or discharge instructions at this time. pt confirmed appt to see PCP on 24 Jan 25. pt ok with contact after PCP appt. (1/20/2025 11:18 AM)  Call End Time: 1118 (1/20/2025 11:18 AM)

## 2025-01-21 ENCOUNTER — OFFICE VISIT (OUTPATIENT)
Dept: HEMATOLOGY/ONCOLOGY | Facility: HOSPITAL | Age: 50
End: 2025-01-21
Payer: COMMERCIAL

## 2025-01-21 ENCOUNTER — LAB (OUTPATIENT)
Dept: LAB | Facility: LAB | Age: 50
End: 2025-01-21
Payer: COMMERCIAL

## 2025-01-21 VITALS
BODY MASS INDEX: 20.36 KG/M2 | OXYGEN SATURATION: 100 % | WEIGHT: 118.6 LBS | SYSTOLIC BLOOD PRESSURE: 95 MMHG | RESPIRATION RATE: 16 BRPM | TEMPERATURE: 97.6 F | DIASTOLIC BLOOD PRESSURE: 56 MMHG | HEART RATE: 68 BPM

## 2025-01-21 DIAGNOSIS — E87.6 HYPOKALEMIA: ICD-10-CM

## 2025-01-21 DIAGNOSIS — D68.4 BLOOD CLOTTING FACTOR DEFICIENCY DISORDER (MULTI): Primary | ICD-10-CM

## 2025-01-21 LAB
A-AMINOBUTYR/CREAT UR-RTO: <10 UMOL/G CRT
AAA/CREAT UR-RTO: 11 UMOL/G CRT
ALANINE/CREAT UR-RTO: 313 UMOL/G CRT (ref 60–500)
AMINO ACID PAT UR-IMP: ABNORMAL
ANSERINE/CREAT UR-RTO: <25 UMOL/G CRT
ARGININE/CREAT UR-RTO: 49 UMOL/G CRT
ARGININOSUCCINATE/CREAT UR-RTO: <10 UMOL/G CRT
ASPARAGINE/CREAT UR-RTO: 316 UMOL/G CRT (ref 25–180)
ASPARTATE/CREAT UR-RTO: <25 UMOL/G CRT
B-AIB/CREAT UR-RTO: <25 UMOL/G CRT
B-ALANINE/CREAT UR-RTO: <125 UMOL/G CRT
CHLORIDE STL-SCNC: 46 MMOL/L
CITRULLINE/CREAT UR-RTO: 24 UMOL/G CRT
CREAT UR-MCNC: 107 MG/DL
CYSTATHIONIN/CREAT UR-RTO: <25 UMOL/G CRT
CYSTINE/CREAT UR-RTO: 37 UMOL/G CRT
ETHANOLAMINE/CREAT UR-RTO: 177 UMOL/G CRT (ref 100–510)
GABA/CREAT UR-RTO: <25 UMOL/G CRT
GLUTAMATE/CREAT UR-RTO: <25 UMOL/G CRT
GLUTAMINE/CREAT UR-RTO: 205 UMOL/G CRT (ref 100–665)
GLYCINE/CREAT UR-RTO: 280 UMOL/G CRT (ref 230–3510)
HISTIDINE/CREAT UR-RTO: 1271 UMOL/G CRT (ref 80–1130)
HOMOCITRULLINE/CREAT UR-RTO: <25 UMOL/G CRT
ISOLEUCINE/CREAT UR-RTO: <25 UMOL/G CRT
LEUCINE/CREAT UR-RTO: 19 UMOL/G CRT
LYSINE/CREAT UR-RTO: 92 UMOL/G CRT
METHIONINE/CREAT UR-RTO: <10 UMOL/G CRT
OH-LYSINE/CREAT UR-RTO: <25 UMOL/G CRT
OH-PROLINE/CREAT UR-RTO: <10 UMOL/G CRT
ORNITHINE/CREAT UR-RTO: <25 UMOL/G CRT
PHE/CREAT UR-RTO: 41 UMOL/G CRT (ref 15–85)
POTASSIUM STL-SCNC: 105 MMOL/L
PROLINE/CREAT UR-RTO: <10 UMOL/G CRT
SARCOSINE/CREAT UR-RTO: <25 UMOL/G CRT
SERINE/CREAT UR-RTO: 345 UMOL/G CRT (ref 90–470)
SODIUM STL-SCNC: 69 MMOL/L
TAURINE/CREAT UR-RTO: 1489 UMOL/G CRT
THREONINE/CREAT UR-RTO: 255 UMOL/G CRT (ref 25–250)
TRYPTOPHAN/CREAT UR-RTO: 75 UMOL/G CRT (ref 15–95)
TYROSINE/CREAT UR-RTO: 105 UMOL/G CRT (ref 15–150)
VALINE/CREAT UR-RTO: 18 UMOL/G CRT

## 2025-01-21 PROCEDURE — 82088 ASSAY OF ALDOSTERONE: CPT

## 2025-01-21 PROCEDURE — 36415 COLL VENOUS BLD VENIPUNCTURE: CPT

## 2025-01-21 PROCEDURE — 99215 OFFICE O/P EST HI 40 MIN: CPT | Performed by: INTERNAL MEDICINE

## 2025-01-21 PROCEDURE — 99205 OFFICE O/P NEW HI 60 MIN: CPT | Performed by: INTERNAL MEDICINE

## 2025-01-21 ASSESSMENT — ENCOUNTER SYMPTOMS
DEPRESSION: 0
CARDIOVASCULAR NEGATIVE: 1
OCCASIONAL FEELINGS OF UNSTEADINESS: 0
LOSS OF SENSATION IN FEET: 0
RESPIRATORY NEGATIVE: 1
CONSTITUTIONAL NEGATIVE: 1
GASTROINTESTINAL NEGATIVE: 1

## 2025-01-21 ASSESSMENT — COLUMBIA-SUICIDE SEVERITY RATING SCALE - C-SSRS
2. HAVE YOU ACTUALLY HAD ANY THOUGHTS OF KILLING YOURSELF?: NO
1. IN THE PAST MONTH, HAVE YOU WISHED YOU WERE DEAD OR WISHED YOU COULD GO TO SLEEP AND NOT WAKE UP?: NO
6. HAVE YOU EVER DONE ANYTHING, STARTED TO DO ANYTHING, OR PREPARED TO DO ANYTHING TO END YOUR LIFE?: NO

## 2025-01-21 ASSESSMENT — PATIENT HEALTH QUESTIONNAIRE - PHQ9
2. FEELING DOWN, DEPRESSED OR HOPELESS: NOT AT ALL
1. LITTLE INTEREST OR PLEASURE IN DOING THINGS: NOT AT ALL
SUM OF ALL RESPONSES TO PHQ9 QUESTIONS 1 AND 2: 0

## 2025-01-21 ASSESSMENT — PAIN SCALES - GENERAL: PAINLEVEL_OUTOF10: 0-NO PAIN

## 2025-01-21 NOTE — PROGRESS NOTES
Patient ID: Sienna Acuña is a 49 y.o. female.  Referring Physician: No referring provider defined for this encounter.  Primary Care Provider: Carrol Yepez DO  Referral Reason: Bleeding tendency    Subjective:  Has had significant bleeding after giving birth to her daughter in 2002. She had similarly severe bleeding after other surgeries. Was thought to have a platelet defect vs. Von Willebrand disease => She was given DDAVP iv after a surgery years ago. Also was prescribed DDAVP nasal spray which she had to use only before surgeries.   She complains of frequent gum bleeds and also severe periods until she had hysterectomy (after which she required the fore-mentioned IV DDAVP). The latter was due to endometriosis. She frequently gets nose bleeds that stop within 5-10 minutes without DDAVP.    Her brother had embolic stroke at his 40s. Mother and uncle had colon cancer at early age.     Assessment/Plan:  ? Bleeding tendency: Her history is positive for a bleeding tendency. I reviewed the past coags @ [facility]. Abnormality in vWF function in one of the tests and mildly decreased FXII level in another. Cannot explain the increased bleeding with those alone. Will repeat coags today and may need further testing based on the results.   Depending on the results, we may need to challenge her with DDAVP to see if levels are correcting.   She will go for a knee replacement in 5-6 months.   Sigmoidoscopy in 2024 was OK.    Review Of Systems:  Review of Systems   Constitutional: Negative.    HENT:  Negative.     Respiratory: Negative.     Cardiovascular: Negative.    Gastrointestinal: Negative.        Physical Exam:  BP 95/56 (BP Location: Left arm, Patient Position: Sitting)   Pulse 68   Temp 36.4 °C (97.6 °F) (Temporal)   Resp 16   Wt 53.8 kg (118 lb 9.6 oz)   SpO2 100%   BMI 20.36 kg/m²   BSA: 1.56 meters squared  Performance Status: Asymptomatic  Physical Exam  HENT:      Head: Normocephalic and  atraumatic.   Eyes:      General: No scleral icterus.  Pulmonary:      Effort: Pulmonary effort is normal.   Musculoskeletal:         General: Normal range of motion.   Lymphadenopathy:      Cervical: No cervical adenopathy.   Skin:     Coloration: Skin is not jaundiced.   Neurological:      General: No focal deficit present.      Mental Status: She is alert and oriented to person, place, and time.         Results:  Diagnostic Results   Lab Results   Component Value Date    WBC 6.5 01/18/2025    HGB 12.1 01/18/2025    HCT 33.5 (L) 01/18/2025    MCV 92 01/18/2025     01/18/2025     Lab Results   Component Value Date    CALCIUM 7.1 (L) 01/18/2025     01/18/2025    K 3.5 01/18/2025    CO2 19 (L) 01/18/2025     (H) 01/18/2025    BUN 8 01/18/2025    CREATININE 0.90 01/18/2025    ALT 25 01/16/2025    AST 37 01/16/2025       Current Outpatient Medications:     atogepant (QULIPTA ORAL), Take 1 tablet by mouth once daily., Disp: , Rfl:     buPROPion XL (Wellbutrin XL) 300 mg 24 hr tablet, Take 1 tablet (300 mg) by mouth once daily in the morning., Disp: , Rfl:     cholecalciferol (Vitamin D-3) 50,000 unit capsule, Take 1 capsule (50,000 Units) by mouth every 30 (thirty) days., Disp: , Rfl:     diclofenac epolamine 1.3 % patch, APPLY 1  TOPICALLY TO SKIN ONCE DAILY (Patient taking differently: Place 1 patch on the skin once daily as needed.), Disp: 30 patch, Rfl: 3    estradiol (Estrace) 0.01 % (0.1 mg/gram) vaginal cream, Apply pea-sized amount of cream to affected area twice a week., Disp: 42.5 g, Rfl: 3    Ibsrela 50 mg tablet tablet, Take 1 tablet (50 mg) by mouth 2 times a day., Disp: , Rfl:     liothyronine (Cytomel) 5 mcg tablet, Take 1 tablet (5 mcg) by mouth 2 times a day., Disp: , Rfl:     metoprolol succinate XL (Toprol-XL) 25 mg 24 hr tablet, Take 1 tablet (25 mg) by mouth once daily. Do not crush or chew., Disp: 30 tablet, Rfl: 5    Nurtec ODT 75 mg tablet,disintegrating, Dissolve 1 tablet (75  mg) in the mouth once daily as needed (headache)., Disp: , Rfl:     ondansetron (Zofran) 4 mg tablet, Take 1 tablet (4 mg) by mouth every 8 hours if needed for nausea or vomiting., Disp: , Rfl:     rizatriptan (Maxalt) 10 mg tablet, TAKE 1 TABLET BY MOUTH AS NEEDED AT ONSET OF HEADACHE.MAY REPEAT AFTER 2 HOURS.DO NOT EXCEED 3 TABLETS (30MG) PER DAY, Disp: , Rfl:     topiramate (Topamax) 25 mg tablet, Take 3 tablets (75 mg) by mouth once daily., Disp: 90 tablet, Rfl: 0    vilazodone (Viibryd) 40 mg tablet, Take 1 tablet (40 mg) by mouth once daily with breakfast., Disp: , Rfl:     ziprasidone (Geodon) 20 mg capsule, Take 1 capsule (20 mg) by mouth 2 times daily (morning and late afternoon)., Disp: , Rfl:     potassium chloride CR (Klor-Con M20) 20 mEq ER tablet, Take 1 tablet (20 mEq) by mouth 2 times a day. Do not crush or chew., Disp: 60 tablet, Rfl: 11    Synthroid 88 mcg tablet, Take 100 mcg by mouth early in the morning.. Takes 100mcg, Disp: , Rfl:      Past Surgical History:   Procedure Laterality Date    BREAST SURGERY Bilateral     AUGMENTATION     SECTION, CLASSIC  2013     Section     SECTION, LOW TRANSVERSE  10-7-2003    CHOLECYSTECTOMY      COLON SURGERY      COLONOSCOPY      CT ABDOMEN PELVIS ANGIOGRAM W AND/OR WO IV CONTRAST  2014    CT ABDOMEN PELVIS ANGIOGRAM W AND/OR WO IV CONTRAST 2014 GEN ANCILLARY LEGACY    ESOPHAGOGASTRODUODENOSCOPY  2022    HYSTERECTOMY  04/10/2013    Hysterectomy    KNEE ARTHROPLASTY  2024    KNEE ARTHROSCOPY W/ DEBRIDEMENT Left 10/30/2024    Left knee scope AJD, debridement of bakers cyst via posteromedial portal    KNEE ARTHROSCOPY, MEDIAL PATELLO FEMORAL LIGAMENT REPAIR Left     SIGMOIDECTOMY      SIGMOIDECTOMY      TUBAL LIGATION  2013    Tubal Ligation     Family History   Problem Relation Name Age of Onset    Diabetes Other grandparent     Other (thyroid disorder) Other grandparent     Endometriosis Other aunt      Cancer Other      Diabetes Other      Endometriosis Other      Other (thyroid disorder) Other      Blood Disorder Mother Mom     Cancer Mother Mom     Clotting disorder Mother Mom     Colon cancer Mother Mom     Arthritis Maternal Grandmother Grandma     Kidney disease Maternal Grandmother Grandma     Diabetes Paternal Grandfather Maximilian Vargas     Diabetes Brother Mando Vargas     Stroke Brother Mando Vargas       reports that she has never smoked. She has never used smokeless tobacco.  Social History     Socioeconomic History    Marital status:      Spouse name: Not on file    Number of children: Not on file    Years of education: Not on file    Highest education level: Not on file   Occupational History    Not on file   Tobacco Use    Smoking status: Never    Smokeless tobacco: Never   Vaping Use    Vaping status: Never Used   Substance and Sexual Activity    Alcohol use: Yes     Alcohol/week: 2.0 standard drinks of alcohol     Types: 2 Glasses of wine per week     Comment: weekends    Drug use: Never    Sexual activity: Yes     Partners: Male     Birth control/protection: Post-menopausal, Surgical   Other Topics Concern    Not on file   Social History Narrative    Not on file     Social Drivers of Health     Financial Resource Strain: Low Risk  (1/17/2025)    Overall Financial Resource Strain (CARDIA)     Difficulty of Paying Living Expenses: Not hard at all   Food Insecurity: No Food Insecurity (1/16/2025)    Hunger Vital Sign     Worried About Running Out of Food in the Last Year: Never true     Ran Out of Food in the Last Year: Never true   Transportation Needs: No Transportation Needs (1/17/2025)    PRAPARE - Transportation     Lack of Transportation (Medical): No     Lack of Transportation (Non-Medical): No   Physical Activity: Not on file   Stress: No Stress Concern Present (1/21/2025)    [employer] - Occupational Stress Questionnaire     Feeling of Stress : Only a  little   Social Connections: Not on file   Intimate Partner Violence: Not At Risk (1/16/2025)    Humiliation, Afraid, Rape, and Kick questionnaire     Fear of Current or Ex-Partner: No     Emotionally Abused: No     Physically Abused: No     Sexually Abused: No   Housing Stability: Low Risk  (1/17/2025)    Housing Stability Vital Sign     Unable to Pay for Housing in the Last Year: No     Number of Times Moved in the Last Year: 0     Homeless in the Last Year: No       Diagnoses and all orders for this visit:  Blood clotting factor deficiency disorder (Multi)  -     Referral to Hematology and Oncology  -     aPTT; Future  -     PT-INR; Future  -     D-Dimer, Quantitative Non VTE; Future  -     Factor 12 Activity; Future  -     Von Willebrand Antigen; Future  -     von Willebrand Factor, Activity (Ristocetin Cofactor); Future  -     Platelet Aggregation Study; Future       Vaishnavi Ryan MD

## 2025-01-22 ENCOUNTER — LAB (OUTPATIENT)
Dept: LAB | Facility: LAB | Age: 50
End: 2025-01-22
Payer: COMMERCIAL

## 2025-01-22 DIAGNOSIS — E87.20 METABOLIC ACIDOSIS: ICD-10-CM

## 2025-01-22 DIAGNOSIS — D68.4 BLOOD CLOTTING FACTOR DEFICIENCY DISORDER (MULTI): ICD-10-CM

## 2025-01-22 DIAGNOSIS — E87.6 HYPOKALEMIA: ICD-10-CM

## 2025-01-22 LAB
ALBUMIN SERPL BCP-MCNC: 4 G/DL (ref 3.4–5)
ANION GAP SERPL CALC-SCNC: 11 MMOL/L (ref 10–20)
BUN SERPL-MCNC: 9 MG/DL (ref 6–23)
CALCIUM SERPL-MCNC: 9 MG/DL (ref 8.6–10.3)
CHLORIDE SERPL-SCNC: 103 MMOL/L (ref 98–107)
CO2 SERPL-SCNC: 28 MMOL/L (ref 21–32)
CREAT SERPL-MCNC: 0.99 MG/DL (ref 0.5–1.05)
EGFRCR SERPLBLD CKD-EPI 2021: 71 ML/MIN/1.73M*2
GLUCOSE SERPL-MCNC: 93 MG/DL (ref 74–99)
PHOSPHATE SERPL-MCNC: 2.4 MG/DL (ref 2.5–4.9)
POTASSIUM SERPL-SCNC: 4.2 MMOL/L (ref 3.5–5.3)
SODIUM SERPL-SCNC: 138 MMOL/L (ref 136–145)

## 2025-01-22 PROCEDURE — 85280 CLOT FACTOR XII HAGEMAN: CPT

## 2025-01-22 PROCEDURE — 85245 CLOT FACTOR VIII VW RISTOCTN: CPT

## 2025-01-22 PROCEDURE — 85379 FIBRIN DEGRADATION QUANT: CPT

## 2025-01-22 PROCEDURE — 85246 CLOT FACTOR VIII VW ANTIGEN: CPT

## 2025-01-22 PROCEDURE — 85610 PROTHROMBIN TIME: CPT

## 2025-01-22 PROCEDURE — 80069 RENAL FUNCTION PANEL: CPT

## 2025-01-22 PROCEDURE — 85730 THROMBOPLASTIN TIME PARTIAL: CPT

## 2025-01-22 PROCEDURE — 36415 COLL VENOUS BLD VENIPUNCTURE: CPT

## 2025-01-22 NOTE — SIGNIFICANT EVENT
Follow Up Phone Call    Outgoing phone call    Spoke to: Sienna Acuña Relationship:self   Phone number: [phone]      Outcome: I left a message on answering machine   Chief Complaint   Patient presents with    Shortness of Breath          Diagnosis:Not applicable

## 2025-01-23 LAB
ALDOSTERONE IN SERUM: 14.2 NG/DL
APTT PPP: 37 SECONDS (ref 27–38)
C COLI+JEJ+UPSA DNA STL QL NAA+PROBE: NOT DETECTED
D DIMER PPP FEU-MCNC: 1342 NG/ML FEU
EC STX1 GENE STL QL NAA+PROBE: NOT DETECTED
EC STX2 GENE STL QL NAA+PROBE: NOT DETECTED
INR PPP: 0.9 (ref 0.9–1.1)
NOROVIRUS GI + GII RNA STL NAA+PROBE: NOT DETECTED
PROTHROMBIN TIME: 10.6 SECONDS (ref 9.8–12.8)
RV RNA STL NAA+PROBE: NOT DETECTED
SALMONELLA DNA STL QL NAA+PROBE: NOT DETECTED
SCAN RESULT: NORMAL
SHIGELLA DNA SPEC QL NAA+PROBE: NOT DETECTED
V CHOLERAE DNA STL QL NAA+PROBE: NOT DETECTED
Y ENTEROCOL DNA STL QL NAA+PROBE: NOT DETECTED

## 2025-01-23 NOTE — PROGRESS NOTES
"Subjective   Patient ID: Sienna Acuña is a 49 y.o. female who presents for No chief complaint on file..    Migraines: On topiramate, qulipta, and rizatriptan for breakthrough. She has zofran prn for migraines.      Grief: On geodon and wellbutrin.     Hypothyroidism, reactive hypoglycemia: On synthroid and cytomel. Following with Dr. Maureen Roland. She is on metformin for reactive hypolgycemia.      CKD: She is on spironolactone. Following with Mis every 6 months.      Chronic constipation: She has history of sigmoidectomy for \"kinking\" in the colon. She has been told she has structural changes from the chronic constipation. She is on trulance for the last few years, she has tried a lot of meds without relief. She is taking miralax every day as well.      Clotting disorder: She has been told she has von willebrands and other times she has been told she has clotting deficiency. She takes DDAVP prior to surgeries or when she is having a bleeding episode. She has had 3 severe bleeding episodes (hysterectomy, sigmoidectomy, and then when she had her vaginal delivery).      Left knee pain: She is getting partial replacement in August with Dr. Green in [facility]. 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      She is going on a cruise soon but she has gotten seasick before and she is hoping to get rx for scopolamine.     She has a 22 year old daughter finishing up her psychology degree.      Review of systems completed and unremarkable other than what is documented in HPI.    Objective   There were no vitals taken for this visit.    Gen: No acute distress, alert and oriented x3, pleasant   HEENT: moist mucous membranes, b/l external auditory canals are clear of debris, TMs within normal limits, no oropharyngeal lesions, eomi, perrla   Neck: thyroid within normal limits, no lymphadenopathy   CV: RRR, normal S1/S2, no murmur   Resp: Clear to auscultation bilaterally, no wheezes or rhonchi " Writer left message on patient's VM requesting return call to clinic.   appreciated  Abd: soft, nontender, non-distended, no guarding/rigidity, bowel sounds present  Extr: no edema, no calf tenderness  Derm: Skin is warm and dry, no rashes appreciated  Psych: mood is good, affect is congruent, good hygiene, normal speech and eye contact  Neuro: cranial nerves grossly intact, normal gait    Assessment/Plan   #Migraines:   On topiramate, qulipta, and rizatriptan for breakthrough  She has zofran prn for migraines.      #Grief:   Currently on geodon and wellbutrin     #Hypothyroidism  #Reactive hypoglycemia:   On synthroid and cytomel  Following with Dr. Maureen Roland  She is on metformin for reactive hypolgycemia     #CKD:   She is on spironolactone  Following with Mis every 6 months.      #Chronic constipation:   #h/o sigmoidectomy  On trulance and miralax     #Clotting disorder:   H/o severe bleeding with surgeries  Using DDAVP prn for bleeds or prior to surgery  Establishing with Dr. Kruse     #Left knee pain:   Planning for partial replacement in August with Dr. Green     HCM:  UTD mammogram July  No further paps due to hysterectomy   DDAVP prn for bleeds or prior to surgery  Establishing with Dr. Kruse     #Left knee pain:   Planning for partial replacement in August with Dr. Green     HCM:  UTD mammogram July  No further paps due to hysterectomy

## 2025-01-24 ENCOUNTER — OFFICE VISIT (OUTPATIENT)
Dept: PRIMARY CARE | Facility: CLINIC | Age: 50
End: 2025-01-24
Payer: COMMERCIAL

## 2025-01-24 ENCOUNTER — LAB (OUTPATIENT)
Dept: LAB | Facility: LAB | Age: 50
End: 2025-01-24
Payer: COMMERCIAL

## 2025-01-24 VITALS
SYSTOLIC BLOOD PRESSURE: 112 MMHG | WEIGHT: 117 LBS | BODY MASS INDEX: 19.97 KG/M2 | DIASTOLIC BLOOD PRESSURE: 76 MMHG | HEIGHT: 64 IN

## 2025-01-24 DIAGNOSIS — E03.9 HYPOTHYROIDISM, UNSPECIFIED TYPE: ICD-10-CM

## 2025-01-24 DIAGNOSIS — E87.6 HYPOKALEMIA: Primary | ICD-10-CM

## 2025-01-24 DIAGNOSIS — D68.4 BLOOD CLOTTING FACTOR DEFICIENCY DISORDER (MULTI): ICD-10-CM

## 2025-01-24 LAB
CHLORPROPAMIDE SERPL-MCNC: NORMAL MCG/ML
FACT XII ACT/NOR PPP: 48 % (ref 50–150)
GLIMEPIRIDE SERPL-MCNC: NORMAL NG/ML
GLIPIZIDE SERPL-MCNC: NORMAL NG/ML
GLYBURIDE SERPL-MCNC: NORMAL NG/ML
NATEGLINIDE SERPL-MCNC: NORMAL MCG/ML
PIOGLITAZONE: NORMAL NG/ML
REPAGLINIDE SERPL-MCNC: NORMAL NG/ML
ROSIGLITAZONE: NORMAL NG/ML
TOLAZAMIDE SERPL-MCNC: NORMAL MCG/ML
TOLBUTAMIDE SERPL-MCNC: NORMAL MCG/ML
VWF AG ACT/NOR PPP IA: 62 % (ref 50–220)

## 2025-01-24 PROCEDURE — 99496 TRANSJ CARE MGMT HIGH F2F 7D: CPT | Performed by: FAMILY MEDICINE

## 2025-01-24 PROCEDURE — 82397 CHEMILUMINESCENT ASSAY: CPT

## 2025-01-24 PROCEDURE — 36415 COLL VENOUS BLD VENIPUNCTURE: CPT

## 2025-01-24 PROCEDURE — 3008F BODY MASS INDEX DOCD: CPT | Performed by: FAMILY MEDICINE

## 2025-01-24 PROCEDURE — 85576 BLOOD PLATELET AGGREGATION: CPT

## 2025-01-24 PROCEDURE — 85390 FIBRINOLYSINS SCREEN I&R: CPT

## 2025-01-24 RX ORDER — LEVOTHYROXINE SODIUM 100 UG/1
100 TABLET ORAL DAILY
Qty: 30 TABLET | Refills: 11 | Status: SHIPPED | OUTPATIENT
Start: 2025-01-24 | End: 2026-01-24

## 2025-01-27 ENCOUNTER — PATIENT OUTREACH (OUTPATIENT)
Dept: PRIMARY CARE | Facility: CLINIC | Age: 50
End: 2025-01-27
Payer: COMMERCIAL

## 2025-01-27 LAB
SCAN RESULT: NORMAL
VWF:RCO ACT/NOR PPP PL AGG: 104 % (ref 51–215)

## 2025-01-27 NOTE — DOCUMENTATION CLARIFICATION NOTE
"    PATIENT:               PADILLA SNOW  ACCT #:                  [id number]  MRN:                       [id number]  :                       1975  ADMIT DATE:       2025 3:41 PM  DISCH DATE:        2025 6:15 PM  RESPONDING PROVIDER #:        [id number]          PROVIDER RESPONSE TEXT:    I agree with dietician diagnosis of \"Moderate malnutrition related to acute disease or injury\" on 25.    CDI QUERY TEXT:    Clarification    Instruction:    Based on your assessment of the patient and the clinical information, please provide the requested documentation by clicking on the appropriate radio button and enter any additional information if prompted.    Question: Please further clarify this patient nutritional status as    When answering this query, please exercise your independent professional judgment. The fact that a question is being asked, does not imply that any particular answer is desired or expected.    The patient's clinical indicators include:  Clinical Information: 49 year old female, BMI 18.01, presenting with hypokalemia and metabolic acidosis.    Clinical Indicators:   Nutrition Consult: \"Moderate malnutrition related to acute disease or injury  As Evidenced by: reported <75 percent EENs for >7 days, underweight BMI of 18.0, significant 13 percent weight loss x 1 month, and mild-moderate muscle wasting...    Reports N/V/D started approximately 2 weeks ago so she had no appetite. Was relying on high caloric foods to try to get some nutrition during that timeframe. Pt also drinking 1-2 cartons Core Protein drinks daily. Is amenable to Ensure Clear BID & daily Magic Cup. No additional needs noted at this time...    Height: 162.6 cm (5' 4\")  Weight: 47.6 kg (105 lb)  BMI (Calculated): 18.01  IBW/kg (Dietitian Calculated): 54.5 kg  Percent of IBW: 87 percent...    Weight History / percent Weight Change: 13 percent x 1 month...    Subcutaneous Fat Loss:  -Orbital Fat Pads: Well nourished " "(slightly bulging fat pads)  -Buccal Fat Pads: Well nourished (full, rounded cheeks)  -Triceps: Well nourished (ample fat tissue)  Muscle Wasting:  -Temporalis: Mild-Moderate (slight depression)  -Pectoralis (Clavicular Region): Mild-Moderate (some protrusion of clavicle)  -Deltoid/Trapezius: Mild-Moderate (slight protrusion of acromion process)  Interosseous: Mild-Moderate (slightly depressed area between thumb and forefinger)\"    Treatment:  1/17 Nutrition Consult: \"Nutrition Prescription:  -Continue a liberalized regular diet as tolerated  -Consider starting a daily MVI w/ minerals  -Check vitamin D level & replete PRN  -Continue to adjust anti-emetics PRN    Nutrition Interventions:  -Interventions: Medical food supplement  -Medical Food Supplement: Commercial beverage  -Goal: Ensure Clear BID (240 kcals/8g protein each) + Magic Cup daily (290 kcals/9g protein each)\"    Latest Diet Order: \"Adult diet Regular\", ordered 1/16/25.    Risk Factors: 49 year old female with a recent history of weight loss.  Options provided:  -- I agree with dietician diagnosis of \"Moderate malnutrition related to acute disease or injury\" on 1/17/25.  -- Other - I will add my own diagnosis  -- Refer to Clinical Documentation Reviewer    Query created by: Amanda Edmond on 1/27/2025 10:28 AM      Electronically signed by:  CONCEPCION ISLAS DO 1/27/2025 10:49 AM          "

## 2025-01-28 ENCOUNTER — APPOINTMENT (OUTPATIENT)
Dept: HEMATOLOGY/ONCOLOGY | Facility: HOSPITAL | Age: 50
End: 2025-01-28
Payer: COMMERCIAL

## 2025-01-29 ENCOUNTER — TELEPHONE (OUTPATIENT)
Dept: HEMATOLOGY/ONCOLOGY | Facility: HOSPITAL | Age: 50
End: 2025-01-29
Payer: COMMERCIAL

## 2025-01-29 NOTE — TELEPHONE ENCOUNTER
Notified she will need to have the platelet aggregation study redrawn--scheduled for 2/10/25 at 8a,--stat  to be set up.

## 2025-02-03 ENCOUNTER — TELEPHONE (OUTPATIENT)
Dept: HEMATOLOGY/ONCOLOGY | Facility: HOSPITAL | Age: 50
End: 2025-02-03
Payer: COMMERCIAL

## 2025-02-03 DIAGNOSIS — T69.1XXD CHILBLAINS, SUBSEQUENT ENCOUNTER: Primary | ICD-10-CM

## 2025-02-03 DIAGNOSIS — R76.8 POSITIVE ANA (ANTINUCLEAR ANTIBODY): ICD-10-CM

## 2025-02-03 NOTE — TELEPHONE ENCOUNTER
Sienna Acuña would like to cancel the following appointments:     Tanneras Connor in [facility] ([id number]), 3/4/2025  9:00 AM     Attempted to reach patient regarding the appointment cancellation request above. Left message for patient with call back number to call to reschedule at her earliest convenience.

## 2025-02-03 NOTE — TELEPHONE ENCOUNTER
Patient scheduled for lab draw appointment next Monday, 2/10/25 at 8 AM. Received attached staff message from central scheduling. Patient would like to cancel infusion appointment. Attempted to reach patient to offer to reschedule. Left detailed message, with call back number, for patient to call back at earliest convenience. Appointment cancelled.

## 2025-02-03 NOTE — TELEPHONE ENCOUNTER
----- Message from Sophie SIDDIQUI sent at 2/3/2025  9:05 AM EST -----  Regarding: Cancel Inf -  Patient reaching out through my chart to cancel her 2-10-25 infusion appt.  May I proceed and cancel?    Thank you for your support   Sophie

## 2025-02-04 DIAGNOSIS — E87.6 HYPOKALEMIA: Primary | ICD-10-CM

## 2025-02-04 LAB
BASOPHILS # BLD AUTO: 52 CELLS/UL (ref 0–200)
BASOPHILS NFR BLD AUTO: 0.8 %
C3 SERPL-MCNC: 113 MG/DL (ref 83–193)
C4 SERPL-MCNC: 21 MG/DL (ref 15–57)
CRP SERPL-MCNC: <3 MG/L
EOSINOPHIL # BLD AUTO: 150 CELLS/UL (ref 15–500)
EOSINOPHIL NFR BLD AUTO: 2.3 %
ERYTHROCYTE [DISTWIDTH] IN BLOOD BY AUTOMATED COUNT: 11.9 % (ref 11–15)
ERYTHROCYTE [SEDIMENTATION RATE] IN BLOOD BY WESTERGREN METHOD: 2 MM/H
HCT VFR BLD AUTO: 40.1 % (ref 35–45)
HGB BLD-MCNC: 13.5 G/DL (ref 11.7–15.5)
LYMPHOCYTES # BLD AUTO: 2626 CELLS/UL (ref 850–3900)
LYMPHOCYTES NFR BLD AUTO: 40.4 %
MCH RBC QN AUTO: 33.2 PG (ref 27–33)
MCHC RBC AUTO-ENTMCNC: 33.7 G/DL (ref 32–36)
MCV RBC AUTO: 98.5 FL (ref 80–100)
MONOCYTES # BLD AUTO: 462 CELLS/UL (ref 200–950)
MONOCYTES NFR BLD AUTO: 7.1 %
NEUTROPHILS # BLD AUTO: 3211 CELLS/UL (ref 1500–7800)
NEUTROPHILS NFR BLD AUTO: 49.4 %
PLATELET # BLD AUTO: 497 THOUSAND/UL (ref 140–400)
PMV BLD REES-ECKER: 8.9 FL (ref 7.5–12.5)
RBC # BLD AUTO: 4.07 MILLION/UL (ref 3.8–5.1)
WBC # BLD AUTO: 6.5 THOUSAND/UL (ref 3.8–10.8)

## 2025-02-05 DIAGNOSIS — K82.8 BILIARY DYSKINESIA: ICD-10-CM

## 2025-02-05 RX ORDER — POTASSIUM CHLORIDE 1.5 G/1.58G
20 POWDER, FOR SOLUTION ORAL 2 TIMES DAILY
COMMUNITY
End: 2025-02-05 | Stop reason: SDUPTHER

## 2025-02-05 RX ORDER — POTASSIUM CHLORIDE 1.5 G/1.58G
20 POWDER, FOR SOLUTION ORAL 2 TIMES DAILY
Qty: 60 PACKET | Refills: 0 | Status: SHIPPED | OUTPATIENT
Start: 2025-02-05

## 2025-02-07 NOTE — TELEPHONE ENCOUNTER
Patient scheduled for lab draw appointment next Monday, 2/10/25 at 8 AM. Received attached staff message from central scheduling. Patient would like to cancel infusion appointment. Attempted to reach patient x2 to offer to reschedule. Left detailed message, with call back number, for patient to call back at earliest convenience.

## 2025-02-07 NOTE — TELEPHONE ENCOUNTER
Sienna Acuña would like to cancel the following appointments:     Vaishnavi Ryan in [facility] ([id number]), 3/4/2025  9:00 AM     Attempted to reach patient x2, regarding the appointment cancellation request above. Left message for patient with call back number to call to reschedule at her earliest convenience.

## 2025-02-08 LAB
ANION GAP SERPL CALCULATED.4IONS-SCNC: 9 MMOL/L (CALC) (ref 7–17)
BASOPHILS # BLD AUTO: 61 CELLS/UL (ref 0–200)
BASOPHILS NFR BLD AUTO: 0.9 %
BUN SERPL-MCNC: 16 MG/DL (ref 7–25)
BUN/CREAT SERPL: NORMAL (CALC) (ref 6–22)
CALCIUM SERPL-MCNC: 9.3 MG/DL (ref 8.6–10.2)
CHLORIDE SERPL-SCNC: 108 MMOL/L (ref 98–110)
CO2 SERPL-SCNC: 22 MMOL/L (ref 20–32)
CREAT SERPL-MCNC: 0.94 MG/DL (ref 0.5–0.99)
EGFRCR SERPLBLD CKD-EPI 2021: 75 ML/MIN/1.73M2
EOSINOPHIL # BLD AUTO: 82 CELLS/UL (ref 15–500)
EOSINOPHIL NFR BLD AUTO: 1.2 %
ERYTHROCYTE [DISTWIDTH] IN BLOOD BY AUTOMATED COUNT: 12 % (ref 11–15)
GLUCOSE SERPL-MCNC: 67 MG/DL (ref 65–139)
HCT VFR BLD AUTO: 39.8 % (ref 35–45)
HGB BLD-MCNC: 13.5 G/DL (ref 11.7–15.5)
LYMPHOCYTES # BLD AUTO: 2149 CELLS/UL (ref 850–3900)
LYMPHOCYTES NFR BLD AUTO: 31.6 %
MAGNESIUM SERPL-MCNC: 2.1 MG/DL (ref 1.5–2.5)
MCH RBC QN AUTO: 32.9 PG (ref 27–33)
MCHC RBC AUTO-ENTMCNC: 33.9 G/DL (ref 32–36)
MCV RBC AUTO: 97.1 FL (ref 80–100)
MONOCYTES # BLD AUTO: 449 CELLS/UL (ref 200–950)
MONOCYTES NFR BLD AUTO: 6.6 %
NEUTROPHILS # BLD AUTO: 4060 CELLS/UL (ref 1500–7800)
NEUTROPHILS NFR BLD AUTO: 59.7 %
PLATELET # BLD AUTO: 479 THOUSAND/UL (ref 140–400)
PMV BLD REES-ECKER: 9 FL (ref 7.5–12.5)
POTASSIUM SERPL-SCNC: 4 MMOL/L (ref 3.5–5.3)
RBC # BLD AUTO: 4.1 MILLION/UL (ref 3.8–5.1)
SODIUM SERPL-SCNC: 139 MMOL/L (ref 135–146)
WBC # BLD AUTO: 6.8 THOUSAND/UL (ref 3.8–10.8)

## 2025-02-09 LAB
ATRIAL RATE: 64 BPM
P AXIS: 67 DEGREES
P OFFSET: 177 MS
P ONSET: 138 MS
PR INTERVAL: 142 MS
Q ONSET: 209 MS
QRS COUNT: 11 BEATS
QRS DURATION: 98 MS
QT INTERVAL: 438 MS
QTC CALCULATION(BAZETT): 451 MS
QTC FREDERICIA: 447 MS
R AXIS: 57 DEGREES
T AXIS: 58 DEGREES
T OFFSET: 428 MS
VENTRICULAR RATE: 64 BPM

## 2025-02-10 ENCOUNTER — APPOINTMENT (OUTPATIENT)
Dept: HEMATOLOGY/ONCOLOGY | Facility: HOSPITAL | Age: 50
End: 2025-02-10
Payer: COMMERCIAL

## 2025-02-11 ENCOUNTER — TELEPHONE (OUTPATIENT)
Dept: HEMATOLOGY/ONCOLOGY | Facility: HOSPITAL | Age: 50
End: 2025-02-11
Payer: COMMERCIAL

## 2025-02-11 NOTE — TELEPHONE ENCOUNTER
PCP/referring physician notified of cancelled hematology visit, unable to reach patient after at least 3 attempts. ATR letter out to address on file per office policy.

## 2025-02-11 NOTE — TELEPHONE ENCOUNTER
Sienna Acuña would like to cancel the following appointments:     Tanneras Connor in [facility] ([id number]), 3/4/2025  9:00 AM     Attempted to reach patient x3, regarding the appointment cancellation request above. Left message for patient with call back number to call to reschedule at her earliest convenience.    ATR letter sent to patient today.

## 2025-02-17 ENCOUNTER — PATIENT OUTREACH (OUTPATIENT)
Dept: PRIMARY CARE | Facility: CLINIC | Age: 50
End: 2025-02-17
Payer: COMMERCIAL

## 2025-02-18 NOTE — TELEPHONE ENCOUNTER
Please see 2/11/25 telephone encounters. Patient's PCP and/or referring provider was notified of failure to follow up.  ATR letter was also mailed.

## 2025-02-28 DIAGNOSIS — K82.8 BILIARY DYSKINESIA: ICD-10-CM

## 2025-02-28 RX ORDER — POTASSIUM CHLORIDE 1.5 G/1
POWDER, FOR SOLUTION ORAL
Qty: 60 EACH | Refills: 0 | Status: SHIPPED | OUTPATIENT
Start: 2025-02-28

## 2025-03-04 ENCOUNTER — APPOINTMENT (OUTPATIENT)
Dept: HEMATOLOGY/ONCOLOGY | Facility: HOSPITAL | Age: 50
End: 2025-03-04
Payer: COMMERCIAL

## 2025-03-05 LAB
ATRIAL RATE: 79 BPM
P AXIS: 74 DEGREES
P OFFSET: 201 MS
P ONSET: 151 MS
PR INTERVAL: 148 MS
Q ONSET: 225 MS
QRS COUNT: 13 BEATS
QRS DURATION: 92 MS
QT INTERVAL: 390 MS
QTC CALCULATION(BAZETT): 447 MS
QTC FREDERICIA: 427 MS
R AXIS: 98 DEGREES
T AXIS: 9 DEGREES
T OFFSET: 420 MS
VENTRICULAR RATE: 79 BPM

## 2025-03-06 NOTE — PROGRESS NOTES
"Subjective   Patient ID: Sienna Acuña is a 49 y.o. female who presents for Follow-up (6 weeks hypokalemia, ).    Hypokalemia: Insomnia, shortness of breath, and tachycardia have resolved. She has lost 10lb since her last visit. Cut back on the exercise but went on ozempic for reactive hypoglycemia and failed titration on topiramate x2 due to worsening symptoms. She has pursue close follow up with specialists. Continues potassium 40mg BID with packets.      Migraines: Previously controlled on topiramate, qulipta, and rizatriptan for breakthrough. She has zofran prn for migraines. She has stopped qulipta and she is now getting vyepti infusions. HA have been worse than normal. She was started on medrol pack for the transition period. Vyepti will be every 3 months.      Grief: On geodon and wellbutrin.     Hypothyroidism, reactive hypoglycemia: On synthroid and cytomel. Following with Dr. Maureen Roland. She is on metformin for reactive hypolgycemia.      CKD: She is on spironolactone. Following with Mis/Rah. She ended up trialing down on the dose of the topiramate twice but she had severe HA both times. She was started on phosphorus from the hematologist. She has an appt with the kidney doctor in about a week.      Chronic constipation: She has history of sigmoidectomy for \"kinking\" in the colon. She has been told she has structural changes from the chronic constipation. She is on trulance for the last few years, she has tried a lot of meds without relief. She is taking miralax every day as well.      Clotting disorder: She has been told she has von willebrands and other times she has been told she has clotting deficiency. She takes DDAVP prior to surgeries or when she is having a bleeding episode. She has had 3 severe bleeding episodes (hysterectomy, sigmoidectomy, and then when she had her vaginal delivery).      Left knee pain: She is getting partial replacement in August with Dr. Green in [facility]. 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      She is going on a cruise soon but she has gotten seasick before and she is hoping to get rx for scopolamine.     She has a 22 year old daughter finishing up her psychology degree.      Review of systems completed and unremarkable other than what is documented in HPI.    Objective   /68 (BP Location: Left arm, Patient Position: Sitting, BP Cuff Size: Adult)   Pulse 80   Ht 1.626 m (5' 4\")   Wt 49.4 kg (109 lb)   BMI 18.71 kg/m²     Gen: No acute distress, alert and oriented x3, pleasant   HEENT: moist mucous membranes, b/l external auditory canals are clear of debris, TMs within normal limits, no oropharyngeal lesions, eomi, perrla   Neck: thyroid within normal limits, no lymphadenopathy   CV: RRR, normal S1/S2, no murmur   Resp: Clear to auscultation bilaterally, no wheezes or rhonchi appreciated  Abd: soft, nontender, non-distended, no guarding/rigidity, bowel sounds present  Extr: no edema, no calf tenderness  Derm: Skin is warm and dry, no rashes appreciated  Psych: mood is good, affect is congruent, good hygiene, normal speech and eye contact  Neuro: cranial nerves grossly intact, normal gait    No data recorded     Assessment/Plan   #Hypokalemia  Feeling better but numbers have taken a step back  According to hospital nephrology team this is likely a combination of factors  CKD, topiramate, dehydration from chronic diarrhea and poor oral intake  She has not been able to come off of topiramate  I have told her that I think she should stop ozempic  She has follow up with nephrology in 1 week  Increase potassium to TID     #Migraines:   On topiramate, qulipta, and rizatriptan for breakthrough  She has zofran prn for migraines  Established with Dr. Munoz for neurology     #Grief:   Currently on geodon and wellbutrin     #Hypothyroidism  #Reactive hypoglycemia:   On synthroid and cytomel  Following with Dr. Maureen Roland  She is off metformin " and acarbose for reactive hypolgycemia  She is on ozempic for reactive hypoglycemia     #CKD:   She is on spironolactone  Following with Rah     #Chronic constipation:   #h/o sigmoidectomy  On trulance and miralax     #Clotting disorder:   H/o severe bleeding with surgeries  Using DDAVP prn for bleeds or prior to surgery  Establishing with Dr. Kruse     #Left knee pain:   Planning for partial replacement in August with Dr. Green     HCM:  UTD mammogram July  No further paps due to hysterectomy

## 2025-03-08 LAB
ANION GAP SERPL CALCULATED.4IONS-SCNC: 15 MMOL/L (CALC) (ref 7–17)
BUN SERPL-MCNC: 11 MG/DL (ref 7–25)
BUN/CREAT SERPL: 10 (CALC) (ref 6–22)
CALCIUM SERPL-MCNC: 8.7 MG/DL (ref 8.6–10.2)
CHLORIDE SERPL-SCNC: 111 MMOL/L (ref 98–110)
CO2 SERPL-SCNC: 13 MMOL/L (ref 20–32)
CREAT SERPL-MCNC: 1.05 MG/DL (ref 0.5–0.99)
EGFRCR SERPLBLD CKD-EPI 2021: 66 ML/MIN/1.73M2
GLUCOSE SERPL-MCNC: 81 MG/DL (ref 65–139)
POTASSIUM SERPL-SCNC: 2.9 MMOL/L (ref 3.5–5.3)
SODIUM SERPL-SCNC: 139 MMOL/L (ref 135–146)

## 2025-03-11 ENCOUNTER — APPOINTMENT (OUTPATIENT)
Dept: HEMATOLOGY/ONCOLOGY | Facility: HOSPITAL | Age: 50
End: 2025-03-11
Payer: COMMERCIAL

## 2025-03-11 ENCOUNTER — APPOINTMENT (OUTPATIENT)
Dept: PRIMARY CARE | Facility: CLINIC | Age: 50
End: 2025-03-11
Payer: COMMERCIAL

## 2025-03-11 VITALS
HEART RATE: 80 BPM | SYSTOLIC BLOOD PRESSURE: 105 MMHG | WEIGHT: 109 LBS | HEIGHT: 64 IN | DIASTOLIC BLOOD PRESSURE: 68 MMHG | BODY MASS INDEX: 18.61 KG/M2

## 2025-03-11 DIAGNOSIS — E87.6 HYPOKALEMIA: Primary | ICD-10-CM

## 2025-03-11 PROCEDURE — 99214 OFFICE O/P EST MOD 30 MIN: CPT | Performed by: FAMILY MEDICINE

## 2025-03-11 PROCEDURE — 3008F BODY MASS INDEX DOCD: CPT | Performed by: FAMILY MEDICINE

## 2025-03-11 PROCEDURE — 1036F TOBACCO NON-USER: CPT | Performed by: FAMILY MEDICINE

## 2025-03-11 RX ORDER — UBROGEPANT 100 MG/1
100 TABLET ORAL
COMMUNITY
Start: 2025-01-30 | End: 2026-01-30

## 2025-03-11 RX ORDER — TOPIRAMATE 50 MG/1
CAPSULE, EXTENDED RELEASE ORAL
COMMUNITY
Start: 2025-03-06

## 2025-03-11 RX ORDER — SEMAGLUTIDE 0.68 MG/ML
INJECTION, SOLUTION SUBCUTANEOUS
COMMUNITY
Start: 2025-03-11

## 2025-03-11 RX ORDER — POTASSIUM CHLORIDE 20 MEQ/1
20 TABLET, EXTENDED RELEASE ORAL 3 TIMES DAILY
Qty: 90 TABLET | Refills: 1 | Status: SHIPPED | OUTPATIENT
Start: 2025-03-11 | End: 2025-05-10

## 2025-03-11 RX ORDER — ATOGEPANT 60 MG/1
TABLET ORAL
COMMUNITY
Start: 2025-01-31 | End: 2025-03-11 | Stop reason: ALTCHOICE

## 2025-03-24 ENCOUNTER — APPOINTMENT (OUTPATIENT)
Dept: ENDOCRINOLOGY | Facility: CLINIC | Age: 50
End: 2025-03-24
Payer: COMMERCIAL

## 2025-03-25 NOTE — PROGRESS NOTES
[Referred by brother]    Chief complaint: mid back pain follow-up    This is a pleasant 50 y.o. right handed woman with past medical history significant for hypothyroidism, gastroparesis, migraines, Raynaud's phenomenon, Von Willebrand disease, osteopenia on boniva , presents for follow-up follow-up back pain.    She was last seen here on 12/11/2024, at which point I did repeat bilateral thoracic trigger point injections.  Previously this helped 60 to 100% for several months, this time 75% for a few weeks only due to her hospitalization and medical issues in the meantime (see below).    I advised her to continue diclofenac patch as needed. She really likes the patches and feels they work well. She hasn't needed them since last time.    I advised her to continue her exercises, she is doing so.    Since last visit she was hospitalized briefly in January for hypokalemia and metabolic acidosis after going on a cruise.  After she was stabilized, they recommended to wean Topamax  and she is now on 25 mg for 4 more days until off. She had a vyepti infusion 2 weeks ago an started lamictal yesterday.    She saw the hematology department on 2/17/2025, note personally reviewed today.  She was did not feeling right, so more labs ordered. She has been feeling better slowly.    She is interested in considering Botox for migraines in the future.  She has been getting them since her teens, used to be daily, but with Topamax it helped decrease them to 2-3 times per week.    She has tried and failed for experienced an adverse reaction or has contraindication to first-line oral migraine preventative medications.  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  Calcium Channel blockers-contraindication  Emgality-tried and it did not help  Aimovig -tried and did not help  Gabapentin and Lyrica-did not help  Nortriptyline and Cymbalta did not help  Rizatriptan does not help sufficiently  Ubrelvy helps as a rescue medicine only  Nurtec-does not help  Sumatriptan-did not help        She rates her pain as 5/10, previously 4/10.    Otherwise, there have been no changes to her medications or past medical history since last visit    ________________________________  9/12/2024: Bilateral thoracic trigger point injections, continue diclofenac patch as needed, consider other medications and injections, continue exercises  10/29/2024: Bilateral thoracic trigger point injections, continue diclofenac patch as needed, consider other medications and injections, continue exercises  12/5/2024: late cancel due to weather  12/11/24: Bilateral thoracic trigger point injections, same as above  1/14/2025: Late cancel   3/26/2025: Bilateral thoracic trigger point injections, consider Botox for migraines as well, same as above    __________________________________  As a reminder:      TIMELINE OF COMPLAINT(S):    She is a very active person, lifts weights, swims, etc., and about 5 years ago, she was working out particularly heavily, and slowly started feeling pain in her mid back.  She started seeing a chiropractor, this was not helping, and eventually x-ray showed a T8 compression fracture.  She was diagnosed with osteopenia and is on Boniva at this point.  Her pain was worse with carrying weight, even a purse, worse with sitting, and worse in the summer when she does swimming and more bodybuilding.  She still continues to be active, and does not let this limit her.    She saw my colleague Dr. Matt and got a few rounds of TPI's and intercostal nerve blocks and these helped significantly for up to 6 months at at a time. Dr. Matt left and the patient didn't know where to go, so she was  "\"been getting by\" on diclofenac patches and tylenol as needed.    Note from Dr. Matt 6/25/2020 personally reviewed today.      she underwent a left knee arthroscopy with Dr. Green on 10/30/2024 for patellofemoral disorder, Baker's cyst, OA.    Pain:  LOCATION-mid back pain at bra line  RADIATION- none  CONSTANT or INTERMITTENT- Constant  SEVERITY/QUANTITY- 5/10  QUALITY- aching and dull  WEAKNESS-none  NUMBNESS/TINGLING- none  ASSOCIATED WITH- no falls  EXACERBATED BY-lifting  BETTER WITH-heat  TRIED-      Anti-Inflammatories: diclofenac patch help, (has some kidney disease) previously meloxicam doesn't remember, prednisone helped      Muscle relaxants: Previously Flexeril doesn't remember      Anti-depressants: Previously Cymbalta, nortriptyline didn't help      Neuroleptics: Topamax helps migraines, previously gabapentin, Lyrica doesn't remember      LDN:    PHYSICAL THERAPY: Yes last time a few years ago, active continues with HEP regularly  CHIROPRACTIC MANIPULATION: Yes, didn't help  TENS unit: Yes, helps  ACUPUNCTURE TREATMENTS: No  DEEP TISSUE MASSAGE THERAPY: Yes, doenst help  OSTEOPATHIC MANIPULATION THERAPY: No    EMG/NCS: No    INJECTIONS:  -left sided T4 intercostal nerve block via the erector spinae plane; and trigger point injections with Dr. Matt in 2019 and 2020. These helped 100% for at least 6 months    IMAGING:    === 10/21/14 ===    MRI LUMBAR SPINE WO CONTRAST  -Sacral Tarlov cysts, mild degenerative changes L5-S1, L4-L5, L3-L4    Cervical spine MRI 3/8/2019:  Mild multilevel spondylosis    Thoracic MRI 12/13/2018:  Chronic deformity of the anterior inferior endplate T8, mild spondylosis without significant central canal or neuroforaminal narrowing, no cord compression or abnormal signal    === 11/20/15 ===  XR SPINE LUMBAR 2 OR 3 VW  - Impression -  No evidence of fracture or subluxation.    === 08/08/24 ===  XR THORACIC SPINE 3 VIEWS  - Impression -  Mild-to-moderate multilevel discogenic " degenerative changes of the thoracic spine, more pronounced in the midthoracic spine as described above.    Lab Results   Component Value Date    HGBA1C 4.3 01/18/2025       FUNCTIONAL HISTORY: The patient is independent in all ADLs, mobility, and driving. The patient does not use any assistive device.    SH:  Lives in: [place]  Lives with:   Occupation: Physical therapist Assistant  Tobacco: No  Alcohol: 2-3 drinks weekly  Drugs: No  __________________________________    ROS: The patient denies any bowel or bladder incontinence/accidents, night sweats, fevers, chills, recent significant weight loss. A 14 point review of systems was reviewed with the patient and is as above and otherwise negative.  ROS questionnaire positive for HA, anxiety    PHYSICAL EXAM    GEN - Alert, well-developed, well-nourished, no acute distress  PSYCH - Cooperative, appropriate mood and affect  HEENT - NC/AT  RESP - Non-labored respirations, equal expansion  CV - warm and well-perfused, No cyanosis or edema in extremities.  ABD- soft, ND  SKIN - No rash.    Previous:    BACK/SPINE - Symmetric posture, no erythema, edema, or swelling.  Full range of motion without provocation of pain.  There was significant tenderness and left more than right trigger points identified over the thoracic paraspinal muscles at the bra line.  No tenderness over the spinous processes.      NEURO - UE strength 5/5 -  including shoulder abduction, biceps, triceps, wrist extensors, finger flexors, interossei, and    LE strength 5/5 -  including hip flexors, knee flexors, knee extensors, ankle dorsiflexors, ankle plantar flexors, and EHL   Sensation - intact to light touch in bilateral lower and upper extremities.   Reflexes - 2+ biceps, brachioradialis, triceps, patellar and Achilles reflexes bilaterally, Dean's negative bilaterally  GAIT - Normal base, normal stride length, non-antalgic. Able to toe walk and heel walk without difficulty.      PROCEDURE:    Lidocaine 1%- 6 cc's used, 0 cc's wasted  200mg/20mL (10mg/mL)  NDC 3100-6450-82  LOT [id number]  EXP 01/31/2026  Manuf: [facility]        Thoracic trigger point injections:    Description of the Procedure: The procedure, risks and alternative treatments were discussed with the patient. After written informed consent was obtained, the thoracic paraspinal muscles were palpated and marked. The skin was prepped three times with alcohol. Using a 27 gauge 1.5 inch needle, after negative aspiration, the trigger points in each muscle were injected with a total of 6 cc's of 1% lidocaine, spread equally into 8 sites. Twitch responses were observed in the musculature. The patient tolerated the procedure well with no immediate complications or bleeding.      Plan:   1. The patient was instructed in post-procedural care.  2. The patient was asked to apply moist heat and or ice for the next 24 hours and to perform daily gentle stretching exercises.     Physical Exam  Pulmonary:              IMPRESSION:    This is a pleasant 50 y.o. right handed woman with past medical history significant for hypothyroidism, gastroparesis, migraines, Raynaud's phenomenon, Von Willebrand disease, osteopenia on boniva, presents for follow-up of back pain.  Physical exam is reassuring for lack of neurologic compromise.  Symptoms and physical exam findings consistent with myofascial pain/tension, trigger points which she is likely constantly reactivating with her workout regimens.  Initial instigating event may have been this T8 compression fracture.    -Bilateral thoracic trigger point injections performed as above.  There were no complications and she tolerated the procedure well.  She was provided with postprocedure instructions.  -Continue Diclofenac patch as needed  -Consider other medications in the future   -consider injections: repeat Trigger point injections as needed, botox, and botox for migraines, Ultrasound-guided intercostal  nerve block, referral to Dr. Matt for erector spinae blocks  -Consider repeat thoracic MRI  -Follow-up 2-3 months, or sooner if needed. Message me if you want me to order botox for migraines        The patient expressed understanding and agreement with the assessment and plan. Patient encouraged to contact us should they have any questions, concerns, or any changes in symptoms.     Thank you for allowing me to participate in the care of your patient.      ** Dictated with voice recognition software, please forgive any errors in grammar and/or spelling **

## 2025-03-25 NOTE — PATIENT INSTRUCTIONS
-Ice on and off for the next 24 hours if injection sites are sore. Do gentle range of motion exercises in each area that was injected. Try to do them every hour for about half a minute or so, in every direction that the affected part goes. No pool, bath, or hot tub today. Avoid heavy lifting for the next 2 days.    -Continue Diclofenac patch as needed  -Consider other medications in the future   -consider injections: repeat Trigger point injections as needed, botox, and botox for migraines, Ultrasound-guided intercostal nerve block, referral to Dr. Matt for erector spinae blocks  -Consider repeat thoracic MRI  -Follow-up 2-3 months, or sooner if needed. Message me if you want me to order botox for migraines

## 2025-03-26 ENCOUNTER — APPOINTMENT (OUTPATIENT)
Dept: PHYSICAL MEDICINE AND REHAB | Facility: CLINIC | Age: 50
End: 2025-03-26
Payer: COMMERCIAL

## 2025-03-26 ENCOUNTER — SPECIALTY PHARMACY (OUTPATIENT)
Dept: PHARMACY | Facility: CLINIC | Age: 50
End: 2025-03-26

## 2025-03-26 ENCOUNTER — TELEPHONE (OUTPATIENT)
Dept: PHYSICAL MEDICINE AND REHAB | Facility: CLINIC | Age: 50
End: 2025-03-26

## 2025-03-26 VITALS
WEIGHT: 111 LBS | BODY MASS INDEX: 18.95 KG/M2 | TEMPERATURE: 97 F | DIASTOLIC BLOOD PRESSURE: 72 MMHG | OXYGEN SATURATION: 100 % | HEIGHT: 64 IN | HEART RATE: 72 BPM | SYSTOLIC BLOOD PRESSURE: 108 MMHG

## 2025-03-26 DIAGNOSIS — G58.8 INTERCOSTAL NEURALGIA: ICD-10-CM

## 2025-03-26 DIAGNOSIS — G43.809 OTHER MIGRAINE WITHOUT STATUS MIGRAINOSUS, NOT INTRACTABLE: Primary | ICD-10-CM

## 2025-03-26 DIAGNOSIS — M79.18 MYOFASCIAL PAIN: ICD-10-CM

## 2025-03-26 DIAGNOSIS — M54.6 CHRONIC MIDLINE THORACIC BACK PAIN: ICD-10-CM

## 2025-03-26 DIAGNOSIS — G89.29 CHRONIC MIDLINE THORACIC BACK PAIN: ICD-10-CM

## 2025-03-26 DIAGNOSIS — S29.012A STRAIN OF MID-BACK, INITIAL ENCOUNTER: ICD-10-CM

## 2025-03-26 PROCEDURE — 3008F BODY MASS INDEX DOCD: CPT | Performed by: PHYSICAL MEDICINE & REHABILITATION

## 2025-03-26 PROCEDURE — 99214 OFFICE O/P EST MOD 30 MIN: CPT | Performed by: PHYSICAL MEDICINE & REHABILITATION

## 2025-03-26 PROCEDURE — 20553 NJX 1/MLT TRIGGER POINTS 3/>: CPT | Performed by: PHYSICAL MEDICINE & REHABILITATION

## 2025-03-26 ASSESSMENT — PAIN SCALES - GENERAL: PAINLEVEL_OUTOF10: 5

## 2025-03-31 ENCOUNTER — SPECIALTY PHARMACY (OUTPATIENT)
Dept: PHARMACY | Facility: CLINIC | Age: 50
End: 2025-03-31

## 2025-03-31 ENCOUNTER — TELEPHONE (OUTPATIENT)
Dept: PHYSICAL MEDICINE AND REHAB | Facility: CLINIC | Age: 50
End: 2025-03-31
Payer: COMMERCIAL

## 2025-03-31 PROCEDURE — RXMED WILLOW AMBULATORY MEDICATION CHARGE

## 2025-04-01 ENCOUNTER — PHARMACY VISIT (OUTPATIENT)
Dept: PHARMACY | Facility: CLINIC | Age: 50
End: 2025-04-01
Payer: MEDICARE

## 2025-04-08 NOTE — PROGRESS NOTES
[Referred by brother]    Chief complaint: mid back pain follow-up    This is a pleasant 50 y.o. right handed woman with past medical history significant for hypothyroidism, gastroparesis, migraines, Raynaud's phenomenon, Von Willebrand disease, osteopenia on boniva , presents for follow-up follow-up back pain.    She was last seen here on 3/26/2025, at which point I did bilateral thoracic trigger point injections .  Previously this helped 60 to 100% for several months, this time 50%  but coming back sooner, increased pain again but only with activities.     She is here today for Botox for migraines. We will include 2 thoracic paraspinals    I advised her to continue diclofenac patch as needed. She really likes the patches and feels they work well. She hasn't needed them since last time.    I advised her to continue her exercises, she is doing so.      She rates her pain as 3/10, previously 5/10.    Otherwise, there have been no changes to her medications or past medical history since last visit    ________________________________  9/12/2024: Bilateral thoracic trigger point injections, continue diclofenac patch as needed, consider other medications and injections, continue exercises  10/29/2024: Bilateral thoracic trigger point injections, continue diclofenac patch as needed, consider other medications and injections, continue exercises  12/5/2024: late cancel due to weather  12/11/24: Bilateral thoracic trigger point injections, same as above  1/14/2025: Late cancel   3/26/2025: Bilateral thoracic trigger point injections, consider Botox for migraines as well, same as above  4/9/2025: Botox injections for migraines, same as above  __________________________________  As a reminder:      TIMELINE OF COMPLAINT(S):    She is a very active person, lifts weights, swims, etc., and about 5 years ago, she was working out particularly heavily, and slowly started feeling pain in her mid back.  She started seeing a  "chiropractor, this was not helping, and eventually x-ray showed a T8 compression fracture.  She was diagnosed with osteopenia and is on Boniva at this point.  Her pain was worse with carrying weight, even a purse, worse with sitting, and worse in the summer when she does swimming and more bodybuilding.  She still continues to be active, and does not let this limit her.    She saw my colleague Dr. Matt and got a few rounds of TPI's and intercostal nerve blocks and these helped significantly for up to 6 months at at a time. Dr. Matt left and the patient didn't know where to go, so she was \"been getting by\" on diclofenac patches and tylenol as needed.    Note from Dr. Matt 6/25/2020 personally reviewed today.      she underwent a left knee arthroscopy with Dr. Green on 10/30/2024 for patellofemoral disorder, Baker's cyst, OA.    She is interested in considering Botox for migraines in the future.  She has been getting them since her teens, used to be daily, but with Topamax it helped decrease them to 2-3 times per week.    She has tried and failed for experienced an adverse reaction or has contraindication to first-line oral migraine preventative medications.  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  Calcium Channel blockers-contraindication  Emgality-tried and it did not help  Aimovig -tried and did not help  Gabapentin and Lyrica-did not help  Nortriptyline and Cymbalta did not help  Rizatriptan does not help sufficiently  Ubrelvy helps as a rescue medicine only  Nurtec-does not help  Sumatriptan-did not help      Pain:  LOCATION-mid back pain at bra line  RADIATION- none  CONSTANT or INTERMITTENT- Constant  SEVERITY/QUANTITY- 5/10  QUALITY- " aching and dull  WEAKNESS-none  NUMBNESS/TINGLING- none  ASSOCIATED WITH- no falls  EXACERBATED BY-lifting  BETTER WITH-heat  TRIED-      Anti-Inflammatories: diclofenac patch help, (has some kidney disease) previously meloxicam doesn't remember, prednisone helped      Muscle relaxants: Previously Flexeril doesn't remember      Anti-depressants: Previously Cymbalta, nortriptyline didn't help      Neuroleptics: Topamax helps migraines, previously gabapentin, Lyrica doesn't remember      LDN:    PHYSICAL THERAPY: Yes last time a few years ago, active continues with HEP regularly  CHIROPRACTIC MANIPULATION: Yes, didn't help  TENS unit: Yes, helps  ACUPUNCTURE TREATMENTS: No  DEEP TISSUE MASSAGE THERAPY: Yes, doenst help  OSTEOPATHIC MANIPULATION THERAPY: No    EMG/NCS: No    INJECTIONS:  -left sided T4 intercostal nerve block via the erector spinae plane; and trigger point injections with Dr. Matt in 2019 and 2020. These helped 100% for at least 6 months    IMAGING:    === 10/21/14 ===    MRI LUMBAR SPINE WO CONTRAST  -Sacral Tarlov cysts, mild degenerative changes L5-S1, L4-L5, L3-L4    Cervical spine MRI 3/8/2019:  Mild multilevel spondylosis    Thoracic MRI 12/13/2018:  Chronic deformity of the anterior inferior endplate T8, mild spondylosis without significant central canal or neuroforaminal narrowing, no cord compression or abnormal signal    === 11/20/15 ===  XR SPINE LUMBAR 2 OR 3 VW  - Impression -  No evidence of fracture or subluxation.    === 08/08/24 ===  XR THORACIC SPINE 3 VIEWS  - Impression -  Mild-to-moderate multilevel discogenic degenerative changes of the thoracic spine, more pronounced in the midthoracic spine as described above.    Lab Results   Component Value Date    HGBA1C 4.3 01/18/2025       FUNCTIONAL HISTORY: The patient is independent in all ADLs, mobility, and driving. The patient does not use any assistive device.    SH:  Lives in: [place]  Lives with:   Occupation: Physical  therapist Assistant  Tobacco: No  Alcohol: 2-3 drinks weekly  Drugs: No  __________________________________    ROS: The patient denies any bowel or bladder incontinence/accidents, night sweats, fevers, chills, recent significant weight loss. A 14 point review of systems was reviewed with the patient and is as above and otherwise negative.  ROS questionnaire positive for nausea , constipation    PHYSICAL EXAM    GEN - Alert, well-developed, well-nourished, no acute distress  PSYCH - Cooperative, appropriate mood and affect  HEENT - NC/AT  RESP - Non-labored respirations, equal expansion  CV - warm and well-perfused, No cyanosis or edema in extremities.  ABD- soft, ND  SKIN - No rash.    Previous:    BACK/SPINE - Symmetric posture, no erythema, edema, or swelling.  Full range of motion without provocation of pain.  There was significant tenderness and left more than right trigger points identified over the thoracic paraspinal muscles at the bra line.  No tenderness over the spinous processes.      NEURO - UE strength 5/5 -  including shoulder abduction, biceps, triceps, wrist extensors, finger flexors, interossei, and    LE strength 5/5 -  including hip flexors, knee flexors, knee extensors, ankle dorsiflexors, ankle plantar flexors, and EHL   Sensation - intact to light touch in bilateral lower and upper extremities.   Reflexes - 2+ biceps, brachioradialis, triceps, patellar and Achilles reflexes bilaterally, Dean's negative bilaterally  GAIT - Normal base, normal stride length, non-antalgic. Able to toe walk and heel walk without difficulty.     PROCEDURE:    Botox 200 units x 1 vial, 155 units used, 45 units wasted  NDC 1398-4019-52  LOT [id number]  EXP 04/2027  [facility]    Sodium Chloride 0.9%- 1 cc's used, 0 cc's wasted X 4  20 mL Single dose  NDC 5524-3505-76  LOT [id number]  EXP 05/31/2026  Man[facility]: [facility]      Botulinum toxin A injections:   Head and neck    Procedure: Botulinum toxin  "neurolysis.  Indication: Migraines     Counseling: We discussed the mechanism of action of botulinum toxin, the physiology of the neuromuscular junction. Over half of this 30 minute encounter was devoted to counseling and education.     Consent: The risks and benefits of the procedure were explained in great detail, including risks of allergic reaction, bruising, infection, too much reaction/weakness, no effect, damage to nearby structures. All questions were answered. Written, informed consent was obtained and placed in the chart.     Procedure in detail: \"Time out\" protocol was followed. The skin was prepped with alcohol, and using a sterile, 27 gauge, a total of 155 units of botulinum toxin A (1:2 botox saline mixture) was injected to the following muscles of the head and neck after negative aspiration:     5 bilateral  Procerus 5  Frontalis 5/5 bilateral  Temporalis 5/5/5/5 bilateral  Occipitalis 5/5/5 bilateral  Cervical paraspinal 5/5 bilateral  Trapezius 5/5/5 bilateral  Thoracic paraspinal 45/45    The patient tolerated this well and was given post procedure instructions.     IMPRESSION:    This is a pleasant 50 y.o. right handed woman with past medical history significant for hypothyroidism, gastroparesis, migraines, Raynaud's phenomenon, Von Willebrand disease, osteopenia on boniva, presents for follow-up of back pain.  Physical exam is reassuring for lack of neurologic compromise.  Symptoms and physical exam findings consistent with myofascial pain/tension, trigger points which she is likely constantly reactivating with her workout regimens.  Initial instigating event may have been this T8 compression fracture.    Migraines.      - Botox migraine injections performed as above.  There were no complications and she tolerated the procedure well.  She was provided with postprocedure instructions.    -Continue Diclofenac patch as needed  -Consider other medications in the future   -consider " injections: repeat Trigger point injections as needed, botox, and botox for migraines, Ultrasound-guided intercostal nerve block, referral to Dr. Matt for erector spinae blocks  -Consider repeat thoracic MRI  -Follow-up 1 month to gauge Botox efficacy        The patient expressed understanding and agreement with the assessment and plan. Patient encouraged to contact us should they have any questions, concerns, or any changes in symptoms.     Thank you for allowing me to participate in the care of your patient.      ** Dictated with voice recognition software, please forgive any errors in grammar and/or spelling **

## 2025-04-08 NOTE — PATIENT INSTRUCTIONS
-Ice on and off for the next 24 hours if injection sites are sore. Do gentle range of motion exercises in each area that was injected. Try to do them every hour for about half a minute or so, in every direction that the affected part goes. No pool, bath, or hot tub today. Avoid heavy lifting for the next 2 days.    -Continue Diclofenac patch as needed  -Consider other medications in the future   -consider injections: repeat Trigger point injections as needed, botox, and botox for migraines, Ultrasound-guided intercostal nerve block, referral to Dr. Matt for erector spinae blocks  -Consider repeat thoracic MRI  -Follow-up 1 month to gauge Botox efficacy

## 2025-04-09 ENCOUNTER — APPOINTMENT (OUTPATIENT)
Dept: PHYSICAL MEDICINE AND REHAB | Facility: CLINIC | Age: 50
End: 2025-04-09
Payer: COMMERCIAL

## 2025-04-09 VITALS
SYSTOLIC BLOOD PRESSURE: 101 MMHG | HEART RATE: 68 BPM | TEMPERATURE: 97.3 F | BODY MASS INDEX: 19.97 KG/M2 | WEIGHT: 117 LBS | OXYGEN SATURATION: 98 % | HEIGHT: 64 IN | DIASTOLIC BLOOD PRESSURE: 67 MMHG

## 2025-04-09 DIAGNOSIS — M54.6 CHRONIC MIDLINE THORACIC BACK PAIN: ICD-10-CM

## 2025-04-09 DIAGNOSIS — G58.8 INTERCOSTAL NEURALGIA: ICD-10-CM

## 2025-04-09 DIAGNOSIS — M79.18 MYOFASCIAL PAIN: ICD-10-CM

## 2025-04-09 DIAGNOSIS — G89.29 CHRONIC MIDLINE THORACIC BACK PAIN: ICD-10-CM

## 2025-04-09 DIAGNOSIS — G43.809 OTHER MIGRAINE WITHOUT STATUS MIGRAINOSUS, NOT INTRACTABLE: Primary | ICD-10-CM

## 2025-04-09 DIAGNOSIS — S29.012A STRAIN OF MID-BACK, INITIAL ENCOUNTER: ICD-10-CM

## 2025-04-09 ASSESSMENT — PAIN SCALES - GENERAL: PAINLEVEL_OUTOF10: 3

## 2025-04-11 PROCEDURE — 64615 CHEMODENERV MUSC MIGRAINE: CPT | Performed by: PHYSICAL MEDICINE & REHABILITATION

## 2025-04-17 ENCOUNTER — PATIENT OUTREACH (OUTPATIENT)
Dept: PRIMARY CARE | Facility: CLINIC | Age: 50
End: 2025-04-17
Payer: COMMERCIAL

## 2025-05-15 NOTE — PROGRESS NOTES
"Subjective   Patient ID: Sienna Acuña is a 50 y.o. female who presents for Follow-up (6 months; wart on left hand, getting bigger and sore).    Finger lesion: new, sore.     Migraines: She is off topiramate. She is now on lamictal, botox, and vyepti infusions every 3 months. She is currently doing well with injections.      Grief: On geodon and wellbutrin.     Hypothyroidism, reactive hypoglycemia: On synthroid and cytomel. Following with Dr. Maureen Roland. She is on metformin for reactive hypolgycemia.      CKD: She is on spironolactone. Following with Mis/Rah. She was started on phosphorus from the hematologist. She has an appt with the kidney doctor in about a week. She is stopping phosphorus, bicarbonate, and she is working on coming off of the potassium (she is on 1 20 meq pill per day).      Chronic constipation: She has history of sigmoidectomy for \"kinking\" in the colon. She has been told she has structural changes from the chronic constipation. She is on isbrela currently.      Clotting disorder: She has been told she has von willebrands and other times she has been told she has clotting deficiency. She takes DDAVP prior to surgeries or when she is having a bleeding episode. She has had 3 severe bleeding episodes (hysterectomy, sigmoidectomy, and then when she had her vaginal delivery).      Left knee pain: She is getting partial replacement in August with Dr. Green in [facility]. 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     She has a trip coming up June 20th through July 3rd. [place].      She has a 22 year old daughter finishing up her psychology degree.      Review of systems completed and unremarkable other than what is documented in HPI.    Objective   BP 98/63 (BP Location: Left arm, Patient Position: Sitting, BP Cuff Size: Adult)   Pulse 69   Ht 1.626 m (5' 4\")   Wt 53.1 kg (117 lb)   BMI 20.08 kg/m²     Gen: No acute distress, alert and oriented x3, pleasant "   HEENT: moist mucous membranes, b/l external auditory canals are clear of debris, TMs within normal limits, no oropharyngeal lesions, eomi, perrla   Neck: thyroid within normal limits, no lymphadenopathy   CV: RRR, normal S1/S2, no murmur   Resp: Clear to auscultation bilaterally, no wheezes or rhonchi appreciated  Abd: soft, nontender, non-distended, no guarding/rigidity, bowel sounds present  Extr: no edema, no calf tenderness  Derm: Skin is warm and dry, no rashes appreciated  Psych: mood is good, affect is congruent, good hygiene, normal speech and eye contact  Neuro: cranial nerves grossly intact, normal gait      Assessment/Plan   #Hypokalemia  Improving since coming off topiramate  She is also tapering potassium but she may recheck her labs just before her trip and I encouraged her to take potassium with her just in case    #DIP joint cyst  Referring to ortho hand     #Migraines:   On topiramate, qulipta, and rizatriptan for breakthrough  She has zofran prn for migraines  Established with Dr. Munoz for neurology     #Grief:   Currently on geodon and wellbutrin     #Hypothyroidism  #Reactive hypoglycemia:   On synthroid and cytomel  Following with Dr. Maureen Roland  She is on ozempic for reactive hypoglycemia     #CKD:   Stable off meds  Following with Rah     #Chronic constipation:   #h/o sigmoidectomy  On isbrela     #Clotting disorder:   H/o severe bleeding with surgeries  Using DDAVP prn for bleeds or prior to surgery  Establishing with Dr. Kruse     #Left knee pain:   Planning for partial replacement in August with Dr. Green     HCM:  UTD mammogram July  No further paps due to hysterectomy

## 2025-05-19 ENCOUNTER — APPOINTMENT (OUTPATIENT)
Dept: PRIMARY CARE | Facility: CLINIC | Age: 50
End: 2025-05-19
Payer: COMMERCIAL

## 2025-05-19 VITALS
BODY MASS INDEX: 19.97 KG/M2 | SYSTOLIC BLOOD PRESSURE: 98 MMHG | DIASTOLIC BLOOD PRESSURE: 63 MMHG | HEART RATE: 69 BPM | WEIGHT: 117 LBS | HEIGHT: 64 IN

## 2025-05-19 DIAGNOSIS — D68.9 COAGULOPATHY (MULTI): ICD-10-CM

## 2025-05-19 DIAGNOSIS — E87.6 HYPOKALEMIA: ICD-10-CM

## 2025-05-19 DIAGNOSIS — Z12.31 ENCOUNTER FOR SCREENING MAMMOGRAM FOR MALIGNANT NEOPLASM OF BREAST: Primary | ICD-10-CM

## 2025-05-19 PROCEDURE — 99214 OFFICE O/P EST MOD 30 MIN: CPT | Performed by: FAMILY MEDICINE

## 2025-05-19 PROCEDURE — 3008F BODY MASS INDEX DOCD: CPT | Performed by: FAMILY MEDICINE

## 2025-05-19 PROCEDURE — 1036F TOBACCO NON-USER: CPT | Performed by: FAMILY MEDICINE

## 2025-05-19 RX ORDER — LAMOTRIGINE 25 MG/1
125 TABLET ORAL DAILY
COMMUNITY
Start: 2025-05-08

## 2025-05-19 RX ORDER — LAMOTRIGINE 100 MG/1
1258 TABLET ORAL 2 TIMES DAILY
COMMUNITY
Start: 2025-05-04

## 2025-05-23 LAB
ALBUMIN SERPL-MCNC: 4.4 G/DL (ref 3.6–5.1)
ALP SERPL-CCNC: 56 U/L (ref 31–125)
ALT SERPL-CCNC: 19 U/L (ref 6–29)
ANION GAP SERPL CALCULATED.4IONS-SCNC: 5 MMOL/L (CALC) (ref 7–17)
AST SERPL-CCNC: 26 U/L (ref 10–35)
BASOPHILS # BLD AUTO: 50 CELLS/UL (ref 0–200)
BASOPHILS NFR BLD AUTO: 0.8 %
BILIRUB SERPL-MCNC: 0.3 MG/DL (ref 0.2–1.2)
BUN SERPL-MCNC: 19 MG/DL (ref 7–25)
CALCIUM SERPL-MCNC: 9 MG/DL (ref 8.6–10.2)
CHLORIDE SERPL-SCNC: 104 MMOL/L (ref 98–110)
CO2 SERPL-SCNC: 29 MMOL/L (ref 20–32)
CREAT SERPL-MCNC: 1.13 MG/DL (ref 0.5–0.99)
EGFRCR SERPLBLD CKD-EPI 2021: 60 ML/MIN/1.73M2
EOSINOPHIL # BLD AUTO: 87 CELLS/UL (ref 15–500)
EOSINOPHIL NFR BLD AUTO: 1.4 %
ERYTHROCYTE [DISTWIDTH] IN BLOOD BY AUTOMATED COUNT: 11.1 % (ref 11–15)
GLUCOSE SERPL-MCNC: 58 MG/DL (ref 65–99)
HCT VFR BLD AUTO: 40.3 % (ref 35–45)
HGB BLD-MCNC: 13.4 G/DL (ref 11.7–15.5)
LYMPHOCYTES # BLD AUTO: 2368 CELLS/UL (ref 850–3900)
LYMPHOCYTES NFR BLD AUTO: 38.2 %
MCH RBC QN AUTO: 32.7 PG (ref 27–33)
MCHC RBC AUTO-ENTMCNC: 33.3 G/DL (ref 32–36)
MCV RBC AUTO: 98.3 FL (ref 80–100)
MONOCYTES # BLD AUTO: 341 CELLS/UL (ref 200–950)
MONOCYTES NFR BLD AUTO: 5.5 %
NEUTROPHILS # BLD AUTO: 3354 CELLS/UL (ref 1500–7800)
NEUTROPHILS NFR BLD AUTO: 54.1 %
PLATELET # BLD AUTO: 333 THOUSAND/UL (ref 140–400)
PMV BLD REES-ECKER: 8.8 FL (ref 7.5–12.5)
POTASSIUM SERPL-SCNC: 3.9 MMOL/L (ref 3.5–5.3)
PROT SERPL-MCNC: 6.4 G/DL (ref 6.1–8.1)
RBC # BLD AUTO: 4.1 MILLION/UL (ref 3.8–5.1)
SODIUM SERPL-SCNC: 138 MMOL/L (ref 135–146)
WBC # BLD AUTO: 6.2 THOUSAND/UL (ref 3.8–10.8)

## 2025-05-27 ENCOUNTER — APPOINTMENT (OUTPATIENT)
Dept: PHYSICAL MEDICINE AND REHAB | Facility: CLINIC | Age: 50
End: 2025-05-27
Payer: COMMERCIAL

## 2025-05-27 NOTE — PATIENT INSTRUCTIONS
-Ice on and off for the next 24 hours if injection sites are sore. Do gentle range of motion exercises in each area that was injected. Try to do them every hour for about half a minute or so, in every direction that the affected part goes. No pool, bath, or hot tub today. Avoid heavy lifting for the next 2 days.    -Continue Diclofenac patch as needed  -Consider other medications in the future   -consider injections: repeat Trigger point injections as needed, botox, and botox for migraines, Ultrasound-guided intercostal nerve block, referral to Dr. Matt for erector spinae blocks  -Consider repeat thoracic MRI  -Follow-up after 7/9/2025 for repeat migraine Botox injections.  Increased dose ordered for thoracic paraspinals as well.

## 2025-05-27 NOTE — PROGRESS NOTES
[Referred by brother]    Chief complaint: mid back pain follow-up    This is a pleasant 50 y.o. right handed woman with past medical history significant for hypothyroidism, gastroparesis, migraines, Raynaud's phenomenon, Von Willebrand disease, osteopenia on boniva , presents for follow-up follow-up back pain.    She was last seen here on 4/9/2025, at which point I did Botox injections for migraines and I put some leftover Botox into the thoracic paraspinal muscles.  She was having 3-4 migraines a week, and now 1-2, not as severe. This helped the thoracic muscles too. 50% overall.     I advised her to continue diclofenac patch as needed. She really likes the patches and feels they work well. She hasn't needed them since last time.    I advised her to continue her exercises, she is doing so.      She rates her pain as 5/10, previously 3/10.    Otherwise, there have been no changes to her medications or past medical history since last visit    ________________________________  9/12/2024: Bilateral thoracic trigger point injections, continue diclofenac patch as needed, consider other medications and injections, continue exercises  10/29/2024: Bilateral thoracic trigger point injections, continue diclofenac patch as needed, consider other medications and injections, continue exercises  12/5/2024: late cancel due to weather  12/11/24: Bilateral thoracic trigger point injections, same as above  1/14/2025: Late cancel   3/26/2025: Bilateral thoracic trigger point injections, consider Botox for migraines as well, same as above  4/9/2025: Botox injections for migraines, same as above  5/28/2025: Bilateral thoracic trigger point injections, higher dose of botox for the next time  __________________________________  As a reminder:      TIMELINE OF COMPLAINT(S):    She is a very active person, lifts weights, swims, etc., and about 5 years ago, she was working out particularly heavily, and slowly started feeling pain in her mid  "back.  She started seeing a chiropractor, this was not helping, and eventually x-ray showed a T8 compression fracture.  She was diagnosed with osteopenia and is on Boniva at this point.  Her pain was worse with carrying weight, even a purse, worse with sitting, and worse in the summer when she does swimming and more bodybuilding.  She still continues to be active, and does not let this limit her.    She saw my colleague Dr. Matt and got a few rounds of TPI's and intercostal nerve blocks and these helped significantly for up to 6 months at at a time. Dr. Matt left and the patient didn't know where to go, so she was \"been getting by\" on diclofenac patches and tylenol as needed.    Note from Dr. Matt 6/25/2020 personally reviewed today.      she underwent a left knee arthroscopy with Dr. Green on 10/30/2024 for patellofemoral disorder, Baker's cyst, OA.    She is interested in considering Botox for migraines in the future.  She has been getting them since her teens, used to be daily, but with Topamax it helped decrease them to 2-3 times per week.    She has tried and failed for experienced an adverse reaction or has contraindication to first-line oral migraine preventative medications.  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  Calcium Channel blockers-contraindication  Emgality-tried and it did not help  Aimovig -tried and did not help  Gabapentin and Lyrica-did not help  Nortriptyline and Cymbalta did not help  Rizatriptan does not help sufficiently  Ubrelvy helps as a rescue medicine only  Nurtec-does not help  Sumatriptan-did not help      Pain:  LOCATION-mid back pain at bra line  RADIATION- none  CONSTANT or INTERMITTENT- " Constant  SEVERITY/QUANTITY- 5/10  QUALITY- aching and dull  WEAKNESS-none  NUMBNESS/TINGLING- none  ASSOCIATED WITH- no falls  EXACERBATED BY-lifting  BETTER WITH-heat  TRIED-      Anti-Inflammatories: diclofenac patch help, (has some kidney disease) previously meloxicam doesn't remember, prednisone helped      Muscle relaxants: Previously Flexeril doesn't remember      Anti-depressants: Previously Cymbalta, nortriptyline didn't help      Neuroleptics: Topamax helps migraines but caused too many side effects, previously gabapentin, Lyrica doesn't remember      LDN:    PHYSICAL THERAPY: Yes last time a few years ago, active continues with HEP regularly  CHIROPRACTIC MANIPULATION: Yes, didn't help  TENS unit: Yes, helps  ACUPUNCTURE TREATMENTS: No  DEEP TISSUE MASSAGE THERAPY: Yes, doenst help  OSTEOPATHIC MANIPULATION THERAPY: No    EMG/NCS: No    INJECTIONS:  -left sided T4 intercostal nerve block via the erector spinae plane; and trigger point injections with Dr. Matt in 2019 and 2020. These helped 100% for at least 6 months    IMAGING:    === 10/21/14 ===    MRI LUMBAR SPINE WO CONTRAST  -Sacral Tarlov cysts, mild degenerative changes L5-S1, L4-L5, L3-L4    Cervical spine MRI 3/8/2019:  Mild multilevel spondylosis    Thoracic MRI 12/13/2018:  Chronic deformity of the anterior inferior endplate T8, mild spondylosis without significant central canal or neuroforaminal narrowing, no cord compression or abnormal signal    === 11/20/15 ===  XR SPINE LUMBAR 2 OR 3 VW  - Impression -  No evidence of fracture or subluxation.    === 08/08/24 ===  XR THORACIC SPINE 3 VIEWS  - Impression -  Mild-to-moderate multilevel discogenic degenerative changes of the thoracic spine, more pronounced in the midthoracic spine as described above.    Lab Results   Component Value Date    HGBA1C 4.3 01/18/2025       FUNCTIONAL HISTORY: The patient is independent in all ADLs, mobility, and driving. The patient does not use any assistive  device.    SH:  Lives in: [place]  Lives with:   Occupation: Physical therapist Assistant  Tobacco: No  Alcohol: 2-3 drinks weekly  Drugs: No  __________________________________    ROS: The patient denies any bowel or bladder incontinence/accidents, night sweats, fevers, chills, recent significant weight loss. A 14 point review of systems was reviewed with the patient and is as above and otherwise negative.  ROS questionnaire positive for HA, constipation, back pain    PHYSICAL EXAM    GEN - Alert, well-developed, well-nourished, no acute distress  PSYCH - Cooperative, appropriate mood and affect  HEENT - NC/AT  RESP - Non-labored respirations, equal expansion  CV - warm and well-perfused, No cyanosis or edema in extremities.  ABD- soft, ND  SKIN - No rash.    Previous:    BACK/SPINE - Symmetric posture, no erythema, edema, or swelling.  Full range of motion without provocation of pain.  There was significant tenderness and left more than right trigger points identified over the thoracic paraspinal muscles at the bra line.  No tenderness over the spinous processes.      NEURO - UE strength 5/5 -  including shoulder abduction, biceps, triceps, wrist extensors, finger flexors, interossei, and    LE strength 5/5 -  including hip flexors, knee flexors, knee extensors, ankle dorsiflexors, ankle plantar flexors, and EHL   Sensation - intact to light touch in bilateral lower and upper extremities.   Reflexes - 2+ biceps, brachioradialis, triceps, patellar and Achilles reflexes bilaterally, Dean's negative bilaterally  GAIT - Normal base, normal stride length, non-antalgic. Able to toe walk and heel walk without difficulty.       PROCEDURE:     Lidocaine 1%- 6 cc's used, 0 cc's wasted  200mg/20mL (10mg/mL)  NDC 1777-8086-82  LOT [id number]  EXP 01/31/2026  Manuf: Hospira          Thoracic trigger point injections:     Description of the Procedure: The procedure, risks and alternative treatments were discussed  with the patient. After written informed consent was obtained, the thoracic paraspinal muscles were palpated and marked. The skin was prepped three times with alcohol. Using a 27 gauge 1.5 inch needle, after negative aspiration, the trigger points in each muscle were injected with a total of 6 cc's of 1% lidocaine, spread equally into 8 sites. Twitch responses were observed in the musculature. The patient tolerated the procedure well with no immediate complications or bleeding.       Plan:   1. The patient was instructed in post-procedural care.  2. The patient was asked to apply moist heat and or ice for the next 24 hours and to perform daily gentle stretching exercises.      Physical Exam  Pulmonary:          IMPRESSION:    This is a pleasant 50 y.o. right handed woman with past medical history significant for hypothyroidism, gastroparesis, migraines, Raynaud's phenomenon, Von Willebrand disease, osteopenia on boniva, presents for follow-up of back pain.  Physical exam is reassuring for lack of neurologic compromise.  Symptoms and physical exam findings consistent with myofascial pain/tension, trigger points which she is likely constantly reactivating with her workout regimens.  Initial instigating event may have been this T8 compression fracture.    Migraines.    -Bilateral Thoracic TPI performed as above, there were no complications and she tolerated the procedure well. She was provided with post procedure instructions    -Continue Diclofenac patch as needed  -Consider other medications in the future   -consider injections: repeat Trigger point injections as needed, botox, and botox for migraines, Ultrasound-guided intercostal nerve block, referral to Dr. Matt for erector spinae blocks  -Consider repeat thoracic MRI  -Follow-up after 7/9/2025 for repeat migraine Botox injections.  Increased dose ordered for thoracic paraspinals as well.        The patient expressed understanding and agreement with the assessment  and plan. Patient encouraged to contact us should they have any questions, concerns, or any changes in symptoms.     Thank you for allowing me to participate in the care of your patient.      ** Dictated with voice recognition software, please forgive any errors in grammar and/or spelling **

## 2025-05-28 ENCOUNTER — APPOINTMENT (OUTPATIENT)
Dept: PHYSICAL MEDICINE AND REHAB | Facility: CLINIC | Age: 50
End: 2025-05-28
Payer: COMMERCIAL

## 2025-05-28 VITALS
HEART RATE: 71 BPM | TEMPERATURE: 97.7 F | DIASTOLIC BLOOD PRESSURE: 66 MMHG | BODY MASS INDEX: 19.81 KG/M2 | WEIGHT: 116 LBS | SYSTOLIC BLOOD PRESSURE: 96 MMHG | OXYGEN SATURATION: 100 % | HEIGHT: 64 IN

## 2025-05-28 DIAGNOSIS — M54.6 CHRONIC MIDLINE THORACIC BACK PAIN: ICD-10-CM

## 2025-05-28 DIAGNOSIS — G43.809 OTHER MIGRAINE WITHOUT STATUS MIGRAINOSUS, NOT INTRACTABLE: Primary | ICD-10-CM

## 2025-05-28 DIAGNOSIS — S29.012A STRAIN OF MID-BACK, INITIAL ENCOUNTER: ICD-10-CM

## 2025-05-28 DIAGNOSIS — G89.29 CHRONIC MIDLINE THORACIC BACK PAIN: ICD-10-CM

## 2025-05-28 DIAGNOSIS — M79.18 MYOFASCIAL PAIN: ICD-10-CM

## 2025-05-28 DIAGNOSIS — G58.8 INTERCOSTAL NEURALGIA: ICD-10-CM

## 2025-05-28 DIAGNOSIS — E87.6 HYPOKALEMIA: Primary | ICD-10-CM

## 2025-05-28 PROCEDURE — 99213 OFFICE O/P EST LOW 20 MIN: CPT | Performed by: PHYSICAL MEDICINE & REHABILITATION

## 2025-05-28 PROCEDURE — 3008F BODY MASS INDEX DOCD: CPT | Performed by: PHYSICAL MEDICINE & REHABILITATION

## 2025-05-28 PROCEDURE — 20553 NJX 1/MLT TRIGGER POINTS 3/>: CPT | Performed by: PHYSICAL MEDICINE & REHABILITATION

## 2025-05-28 RX ORDER — POTASSIUM CHLORIDE 750 MG/1
10 TABLET, FILM COATED, EXTENDED RELEASE ORAL DAILY
Qty: 90 TABLET | Refills: 1 | Status: SHIPPED | OUTPATIENT
Start: 2025-05-28 | End: 2026-05-28

## 2025-05-28 ASSESSMENT — PAIN SCALES - GENERAL: PAINLEVEL_OUTOF10: 5

## 2025-05-29 ENCOUNTER — HOSPITAL ENCOUNTER (OUTPATIENT)
Dept: RADIOLOGY | Facility: CLINIC | Age: 50
Discharge: HOME | End: 2025-05-29
Payer: COMMERCIAL

## 2025-05-29 ENCOUNTER — OFFICE VISIT (OUTPATIENT)
Dept: ORTHOPEDIC SURGERY | Facility: CLINIC | Age: 50
End: 2025-05-29
Payer: COMMERCIAL

## 2025-05-29 VITALS — BODY MASS INDEX: 19.63 KG/M2 | HEIGHT: 64 IN | WEIGHT: 115 LBS

## 2025-05-29 DIAGNOSIS — R22.32 FINGER MASS, LEFT: ICD-10-CM

## 2025-05-29 PROCEDURE — 3008F BODY MASS INDEX DOCD: CPT | Performed by: ORTHOPAEDIC SURGERY

## 2025-05-29 PROCEDURE — 73140 X-RAY EXAM OF FINGER(S): CPT | Mod: LT

## 2025-05-29 PROCEDURE — 99203 OFFICE O/P NEW LOW 30 MIN: CPT | Performed by: ORTHOPAEDIC SURGERY

## 2025-05-29 PROCEDURE — 99213 OFFICE O/P EST LOW 20 MIN: CPT | Performed by: ORTHOPAEDIC SURGERY

## 2025-05-29 PROCEDURE — 1036F TOBACCO NON-USER: CPT | Performed by: ORTHOPAEDIC SURGERY

## 2025-05-29 PROCEDURE — 73140 X-RAY EXAM OF FINGER(S): CPT | Mod: LEFT SIDE | Performed by: RADIOLOGY

## 2025-05-29 ASSESSMENT — PAIN DESCRIPTION - DESCRIPTORS: DESCRIPTORS: SORE

## 2025-05-29 ASSESSMENT — PAIN SCALES - GENERAL: PAINLEVEL_OUTOF10: 4

## 2025-05-29 ASSESSMENT — PAIN - FUNCTIONAL ASSESSMENT: PAIN_FUNCTIONAL_ASSESSMENT: 0-10

## 2025-05-30 NOTE — PROGRESS NOTES
History of Present Illness:  Chief Complaint   Patient presents with    Left Hand - Mass     Left middle finger mass        Patient presents today for evaluation of mass about the dorsal aspect of her middle finger DIP joint.  She noticed this a few weeks ago and has had pain in this area that is worse with direct pressure.  She initially had some soreness in the area and subsequently noticed a small mass.  No changes in the nail.    Medical History[1]    Medication Documentation Review Audit       Reviewed by Lana Johnson MA (Medical Assistant) on 05/29/25 at 0915      Medication Order Taking? Sig Documenting Provider Last Dose Status   buPROPion XL (Wellbutrin XL) 300 mg 24 hr tablet [id number] No Take 1 tablet (300 mg) by mouth once daily in the morning. Susana Provider, MD 1/16/2025 Active   cholecalciferol (Vitamin D-3) 50,000 unit capsule [id number] No Take 1 capsule (50,000 Units) by mouth every 30 (thirty) days. Historical Provider, MD 1/12/2025 Active   diclofenac epolamine 1.3 % patch [id number] No APPLY 1  TOPICALLY TO SKIN ONCE DAILY   Patient taking differently: Place 1 patch on the skin once daily as needed.    Loretta Olson MD 1/16/2025 Active   estradiol (Estrace) 0.01 % (0.1 mg/gram) vaginal cream [id number] No Apply pea-sized amount of cream to affected area twice a week. STEFFI Mcdaniels-LAURENM, ND Past Week Active   Ibsrela 50 mg tablet tablet [id number] No Take 1 tablet (50 mg) by mouth 2 times a day. Historical Provider, MD 1/16/2025 Active     Discontinued 05/28/25 1617   lamoTRIgine (LaMICtal) 100 mg tablet [id number]  Take 1,258 mg by mouth 2 times a day. Historical Provider, MD  Active     Discontinued 05/28/25 1616   levothyroxine (Synthroid, Levoxyl) 100 mcg tablet [id number]  Take 1 tablet (100 mcg) by mouth early in the morning.. Take on an empty stomach at the same time each day, either 30 to 60 minutes prior to breakfast Carrol Yepez,   Active   liothyronine (Cytomel) 5 mcg tablet  [id number] No Take 1 tablet (5 mcg) by mouth 2 times a day. Susana Gallardo MD 1/16/2025 Active   onabotulinumtoxinA (Botox) 100 unit injection [id number]  Provider to inject a total of 300 units into the muscles every 90 days for migraine prevention and dystonia. For office use only. Loretta Olson MD  Active     Discontinued 05/28/25 1616   ondansetron (Zofran) 4 mg tablet [id number] No Take 1 tablet (4 mg) by mouth every 8 hours if needed for nausea or vomiting. Historical Provider, MD Unknown Active   Ozempic 0.25 mg or 0.5 mg (2 mg/3 mL) pen injector [id number]   Historical Provider, MD  Active   potassium chloride CR (Klor-Con) 10 mEq ER tablet [id number]  Take 1 tablet (10 mEq) by mouth once daily. Do not crush, chew, or split. Carrol Yepez,   Active   Ubrelvy 100 mg tablet tablet [id number]  Take 1 tablet (100 mg) by mouth. Susana Gallardo MD  Active   vilazodone (Viibryd) 40 mg tablet [id number] No Take 1 tablet (40 mg) by mouth once daily with breakfast. Susana Gallardo MD 1/16/2025 Active   ziprasidone (Geodon) 20 mg capsule [id number] No Take 1 capsule (20 mg) by mouth 2 times daily (morning and late afternoon). Historical Provider, MD 1/16/2025 Active                    RX Allergies[2]    Social History     Socioeconomic History    Marital status:      Spouse name: Not on file    Number of children: Not on file    Years of education: Not on file    Highest education level: Not on file   Occupational History    Not on file   Tobacco Use    Smoking status: Never    Smokeless tobacco: Never   Vaping Use    Vaping status: Never Used   Substance and Sexual Activity    Alcohol use: Yes     Alcohol/week: 2.0 standard drinks of alcohol     Types: 2 Glasses of wine per week     Comment: weekends    Drug use: Never    Sexual activity: Yes     Partners: Male     Birth control/protection: Post-menopausal, Surgical   Other Topics Concern    Not on file   Social History Narrative    Not on file      Social Drivers of Health     Financial Resource Strain: Low Risk  (1/17/2025)    Overall Financial Resource Strain (CARDIA)     Difficulty of Paying Living Expenses: Not hard at all   Food Insecurity: No Food Insecurity (1/16/2025)    Hunger Vital Sign     Worried About Running Out of Food in the Last Year: Never true     Ran Out of Food in the Last Year: Never true   Transportation Needs: No Transportation Needs (1/17/2025)    PRAPARE - Transportation     Lack of Transportation (Medical): No     Lack of Transportation (Non-Medical): No   Physical Activity: Not on file   Stress: No Stress Concern Present (1/21/2025)    [employer] - Occupational Stress Questionnaire     Feeling of Stress : Only a little   Social Connections: Not on file   Intimate Partner Violence: Not At Risk (1/16/2025)    Humiliation, Afraid, Rape, and Kick questionnaire     Fear of Current or Ex-Partner: No     Emotionally Abused: No     Physically Abused: No     Sexually Abused: No   Housing Stability: Low Risk  (1/17/2025)    Housing Stability Vital Sign     Unable to Pay for Housing in the Last Year: No     Number of Times Moved in the Last Year: 0     Homeless in the Last Year: No       Surgical History[3]     Review of Systems   GENERAL: Negative for malaise, significant weight loss, fever  MUSCULOSKELETAL: see HPI  NEURO:  Negative     Physical Examination  Constitutional: Appears well-developed and well-nourished.  Head: Normocephalic and atraumatic.  Eyes: EOMI grossly  Cardiovascular: Intact distal pulses.   Respiratory: Effort normal. No respiratory distress.  Neurologic: Alert and oriented to person, place, and time.  Skin: Skin is warm and dry.  Hematologic / Lymphatic: No lymphedema, lymphangitis.  Psychiatric: normal mood and affect. Behavior is normal.   Musculoskeletal:  Left middle finger: FDS/FDP and terminal extensor intact.  Mass about the radial/dorsal aspect of the DIP joint typical of  mucous cyst.  No nail plate changes appreciated.  Capillary refill less than 2 seconds.  Sensation intact distally.    Radiographs: Left middle finger radiographs ordered and available for my review/interpretation demonstrate mild degenerative changes about DIP joint.  No fracture/dislocation.     Assessment:  Patient with left middle finger mucous cyst at DIP joint     Plan:  Nature of the diagnosis was discussed with the patient.  Risks and benefits of treatment options for mucous cyst were reviewed with the patient.  These include conservative management with observation and surgical excision.  Would not recommend aspiration given high recurrence rate as well as potential risk of infection into the DIP joint.  We discussed risk of recurrence with any option.  At this time patient would like to continue with observation.     If it does remain symptomatic she will call the office for scheduling of right middle finger mucous cyst excision under local anesthetic.                [1]   Past Medical History:  Diagnosis Date    Abnormal CT of thoracic spine 03/31/2017    Abnormal platelet function test (Multi) 01/28/2014    Anemia     Anxiety     Baker's cyst     Baker's cyst of knee, left     Blood in stool 04/16/2015    Colonoscopy & EGD tomorrow  Will speak with Q3 regarding procedures tomorrow   Bowel prep tonight      Bright red blood per rectum 08/22/2017    Chilblain lupus     Chronic kidney disease     Clotting disorder (Multi)     Depression     Disease of thyroid gland     Gastritis 01/2022    Hematuria 03/08/2016    Hypothyroidism     IBS (irritable bowel syndrome)     Infection due to fungus 11/14/2024    Inflammatory bowel disease     Irritation of vulva 11/14/2024    Left thigh pain 11/20/2014    Migraines     Muscle strain of gluteal region 12/05/2014    OAB (overactive bladder)     Osteopenia     Overactive bladder     PONV (postoperative nausea and vomiting)     Positive JENNIFER (antinuclear antibody)      Pyelonephritis 2024      Swelling of extremity 2024   [2]   Allergies  Allergen Reactions    Alendronate Unknown     Possible allergy   Inc bleeding with this medication   [3]   Past Surgical History:  Procedure Laterality Date    BREAST SURGERY Bilateral     AUGMENTATION     SECTION, CLASSIC  2013     Section     SECTION, LOW TRANSVERSE  10-7-2003    CHOLECYSTECTOMY      COLON SURGERY      COLONOSCOPY      CT ABDOMEN PELVIS ANGIOGRAM W AND/OR WO IV CONTRAST  2014    CT ABDOMEN PELVIS ANGIOGRAM W AND/OR WO IV CONTRAST 2014 [facility]    ESOPHAGOGASTRODUODENOSCOPY  2022    HYSTERECTOMY  04/10/2013    Hysterectomy    KNEE ARTHROPLASTY  2024    KNEE ARTHROSCOPY W/ DEBRIDEMENT Left 10/30/2024    Left knee scope AJD, debridement of bakers cyst via posteromedial portal    KNEE ARTHROSCOPY, MEDIAL PATELLO FEMORAL LIGAMENT REPAIR Left     SIGMOIDECTOMY      SIGMOIDECTOMY      TUBAL LIGATION  2013    Tubal Ligation

## 2025-06-04 DIAGNOSIS — N32.81 OAB (OVERACTIVE BLADDER): ICD-10-CM

## 2025-06-04 RX ORDER — MIRABEGRON 50 MG/1
50 TABLET, FILM COATED, EXTENDED RELEASE ORAL DAILY
Qty: 90 TABLET | Refills: 0 | Status: SHIPPED | OUTPATIENT
Start: 2025-06-04

## 2025-06-12 ENCOUNTER — TELEPHONE (OUTPATIENT)
Dept: ORTHOPEDIC SURGERY | Facility: CLINIC | Age: 50
End: 2025-06-12
Payer: COMMERCIAL

## 2025-06-12 ENCOUNTER — HOSPITAL ENCOUNTER (OUTPATIENT)
Facility: CLINIC | Age: 50
Setting detail: OUTPATIENT SURGERY
End: 2025-06-12
Attending: ORTHOPAEDIC SURGERY | Admitting: ORTHOPAEDIC SURGERY
Payer: COMMERCIAL

## 2025-06-12 DIAGNOSIS — R22.32 FINGER MASS, LEFT: ICD-10-CM

## 2025-06-12 NOTE — TELEPHONE ENCOUNTER
5/29/25 Office Visit.    Calling to discuss procedure to remove finger mass, middle finger, left hand.    Dr. Alvarenga told her to just call when she was ready to schedule.    Please call patient.

## 2025-06-26 ENCOUNTER — SPECIALTY PHARMACY (OUTPATIENT)
Dept: PHARMACY | Facility: CLINIC | Age: 50
End: 2025-06-26

## 2025-07-02 PROCEDURE — RXMED WILLOW AMBULATORY MEDICATION CHARGE

## 2025-07-03 ENCOUNTER — PHARMACY VISIT (OUTPATIENT)
Dept: PHARMACY | Facility: CLINIC | Age: 50
End: 2025-07-03
Payer: COMMERCIAL

## 2025-07-08 ENCOUNTER — APPOINTMENT (OUTPATIENT)
Dept: PHYSICAL MEDICINE AND REHAB | Facility: CLINIC | Age: 50
End: 2025-07-08
Payer: COMMERCIAL

## 2025-07-15 ENCOUNTER — TELEPHONE (OUTPATIENT)
Dept: ORTHOPEDIC SURGERY | Facility: CLINIC | Age: 50
End: 2025-07-15
Payer: COMMERCIAL

## 2025-07-15 NOTE — TELEPHONE ENCOUNTER
07/25/25 lt m finger mass excision  Patient called and would like to change her surgery date. She was thinking the end of August or beginning of September. Please call back.

## 2025-07-16 NOTE — TELEPHONE ENCOUNTER
Spoke with patient and she picked 8/22/25 in [place] for her surgery date. I contacted  [facility] and let them know that patient would like to change her surgery date.

## 2025-07-16 NOTE — PROGRESS NOTES
[Referred by brother]    Chief complaint: mid back pain follow-up    This is a pleasant 50 y.o. right handed woman with past medical history significant for hypothyroidism, gastroparesis, migraines, Raynaud's phenomenon, Von Willebrand disease, osteopenia on boniva , presents for follow-up follow-up back pain.    She was last seen here on 5/28/2025, at which point I did repeat thoracic paraspinal trigger point injections.  We discussed how the previous round of Botox injections for her migraines and thoracic paraspinal muscles helped significantly 100% for almost 6 weeks, started coming back on 7/4/23, not back to square one.  And she is here today for repeat Botox injections.      I advised her to continue diclofenac patch as needed. She really likes the patches and feels they work well. She hasn't needed them since last time.    I advised her to continue her exercises, she is doing so.      She rates her pain as 2/10, previously 5/10.    Otherwise, there have been no changes to her medications or past medical history since last visit    ________________________________  9/12/2024: Bilateral thoracic trigger point injections, continue diclofenac patch as needed, consider other medications and injections, continue exercises  10/29/2024: Bilateral thoracic trigger point injections, continue diclofenac patch as needed, consider other medications and injections, continue exercises  12/5/2024: late cancel due to weather  12/11/24: Bilateral thoracic trigger point injections, same as above  1/14/2025: Late cancel   3/26/2025: Bilateral thoracic trigger point injections, consider Botox for migraines as well, same as above  4/9/2025: Botox injections for migraines, same as above  5/28/2025: Bilateral thoracic trigger point injections, higher dose of botox for the next time  7/17/2025: Botox injections for migraines, and thoracic paraspinals same as above  __________________________________  As a reminder:      TIMELINE OF  "COMPLAINT(S):    She is a very active person, lifts weights, swims, etc., and about 5 years ago, she was working out particularly heavily, and slowly started feeling pain in her mid back.  She started seeing a chiropractor, this was not helping, and eventually x-ray showed a T8 compression fracture.  She was diagnosed with osteopenia and is on Boniva at this point.  Her pain was worse with carrying weight, even a purse, worse with sitting, and worse in the summer when she does swimming and more bodybuilding.  She still continues to be active, and does not let this limit her.    She saw my colleague Dr. Matt and got a few rounds of TPI's and intercostal nerve blocks and these helped significantly for up to 6 months at at a time. Dr. Matt left and the patient didn't know where to go, so she was \"been getting by\" on diclofenac patches and tylenol as needed.    Note from Dr. Matt 6/25/2020 personally reviewed today.      she underwent a left knee arthroscopy with Dr. Green on 10/30/2024 for patellofemoral disorder, Baker's cyst, OA.    She is interested in considering Botox for migraines in the future.  She has been getting them since her teens, used to be daily, but with Topamax it helped decrease them to 2-3 times per week.    She has tried and failed for experienced an adverse reaction or has contraindication to first-line oral migraine preventative medications.  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  Calcium Channel blockers-contraindication  Emgality-tried and it did not help  Aimovig -tried and did not help  Gabapentin and Lyrica-did not help  Nortriptyline and Cymbalta did not help  Rizatriptan does not help sufficiently  Ubrelvy " helps as a rescue medicine only  Nurtec-does not help  Sumatriptan-did not help      Pain:  LOCATION-mid back pain at bra line  RADIATION- none  CONSTANT or INTERMITTENT- Constant  SEVERITY/QUANTITY- 5/10  QUALITY- aching and dull  WEAKNESS-none  NUMBNESS/TINGLING- none  ASSOCIATED WITH- no falls  EXACERBATED BY-lifting  BETTER WITH-heat  TRIED-      Anti-Inflammatories: diclofenac patch help, (has some kidney disease) previously meloxicam doesn't remember, prednisone helped      Muscle relaxants: Previously Flexeril doesn't remember      Anti-depressants: Previously Cymbalta, nortriptyline didn't help      Neuroleptics: Topamax helps migraines but caused too many side effects, previously gabapentin, Lyrica doesn't remember      LDN:    PHYSICAL THERAPY: Yes last time a few years ago, active continues with HEP regularly  CHIROPRACTIC MANIPULATION: Yes, didn't help  TENS unit: Yes, helps  ACUPUNCTURE TREATMENTS: No  DEEP TISSUE MASSAGE THERAPY: Yes, doenst help  OSTEOPATHIC MANIPULATION THERAPY: No    EMG/NCS: No    INJECTIONS:  -left sided T4 intercostal nerve block via the erector spinae plane; and trigger point injections with Dr. Matt in 2019 and 2020. These helped 100% for at least 6 months    IMAGING:    === 10/21/14 ===    MRI LUMBAR SPINE WO CONTRAST  -Sacral Tarlov cysts, mild degenerative changes L5-S1, L4-L5, L3-L4    Cervical spine MRI 3/8/2019:  Mild multilevel spondylosis    Thoracic MRI 12/13/2018:  Chronic deformity of the anterior inferior endplate T8, mild spondylosis without significant central canal or neuroforaminal narrowing, no cord compression or abnormal signal    === 11/20/15 ===  XR SPINE LUMBAR 2 OR 3 VW  - Impression -  No evidence of fracture or subluxation.    === 08/08/24 ===  XR THORACIC SPINE 3 VIEWS  - Impression -  Mild-to-moderate multilevel discogenic degenerative changes of the thoracic spine, more pronounced in the midthoracic spine as described above.    Lab Results    Component Value Date    HGBA1C 4.3 01/18/2025       FUNCTIONAL HISTORY: The patient is independent in all ADLs, mobility, and driving. The patient does not use any assistive device.    SH:  Lives in: [place]  Lives with:   Occupation: Physical therapist Assistant  Tobacco: No  Alcohol: 2-3 drinks weekly  Drugs: No  __________________________________    ROS: The patient denies any bowel or bladder incontinence/accidents, night sweats, fevers, chills, recent significant weight loss. A 14 point review of systems was reviewed with the patient and is as above and otherwise negative.  ROS questionnaire positive for headache, nausea, constipation, back pain    PHYSICAL EXAM    GEN - Alert, well-developed, well-nourished, no acute distress  PSYCH - Cooperative, appropriate mood and affect  HEENT - NC/AT  RESP - Non-labored respirations, equal expansion  CV - warm and well-perfused, No cyanosis or edema in extremities.  ABD- soft, ND  SKIN - No rash.    Previous:    BACK/SPINE - Symmetric posture, no erythema, edema, or swelling.  Full range of motion without provocation of pain.  There was significant tenderness and left more than right trigger points identified over the thoracic paraspinal muscles at the bra line.  No tenderness over the spinous processes.      NEURO - UE strength 5/5 -  including shoulder abduction, biceps, triceps, wrist extensors, finger flexors, interossei, and    LE strength 5/5 -  including hip flexors, knee flexors, knee extensors, ankle dorsiflexors, ankle plantar flexors, and EHL   Sensation - intact to light touch in bilateral lower and upper extremities.   Reflexes - 2+ biceps, brachioradialis, triceps, patellar and Achilles reflexes bilaterally, Dean's negative bilaterally  GAIT - Normal base, normal stride length, non-antalgic. Able to toe walk and heel walk without difficulty.       PROCEDURE:       Botox 100 units x 3 vial, 100 units used, 0 units wasted  NDC  "8385-3818-60  LOT [id number]  EXP 09/2027  Manuf: [facility]  ( Pharmacy)    Sodium Chloride 0.9%- 1 cc's used, 0 cc's wasted x 5  10 mL Single dose  NDC 1357-0716-04  LOT [id number]  EXP 07/31/2026  Manf: [facility]    Sodium Chloride 0.9%- 1 cc's used, 0 cc's wasted X 4  20 mL Single dose  NDC 6157-2529-42  LOT [id number]  EXP 05/31/2026  [facility]: [facility]        Botulinum toxin A injections:   Head and neck     Procedure: Botulinum toxin neurolysis.  Indication: Migraines      Counseling: We discussed the mechanism of action of botulinum toxin, the physiology of the neuromuscular junction. Over half of this 30 minute encounter was devoted to counseling and education.      Consent: The risks and benefits of the procedure were explained in great detail, including risks of allergic reaction, bruising, infection, too much reaction/weakness, no effect, damage to nearby structures. All questions were answered. Written, informed consent was obtained and placed in the chart.      Procedure in detail: \"Time out\" protocol was followed. The skin was prepped with alcohol, and using a sterile, 27 gauge, a total of 300 units of botulinum toxin A (1:2 botox saline mixture for 200 units and 1:1 for 100 units (with clavis)) was injected to the following muscles of the head and neck after negative aspiration:     1:2   5 bilateral  Procerus 5  Frontalis 5/5 bilateral  Temporalis 5/5/5/5 bilateral  Occipitalis 5/5/5 bilateral  Cervical paraspinal 5/5 bilateral  Trapezius 5/5/5 bilateral  Lower thoracic 20,25/20,25  1:1  Thoracic paraspinal 25,25/25,25,    Physical Exam  Pulmonary:               The patient tolerated this well and was given post procedure instructions.          IMPRESSION:    This is a pleasant 50 y.o. right handed woman with past medical history significant for hypothyroidism, gastroparesis, migraines, Raynaud's phenomenon, Von Willebrand disease, osteopenia on boniva, presents for follow-up of back pain.  Physical exam is " reassuring for lack of neurologic compromise.  Symptoms and physical exam findings consistent with myofascial pain/tension, trigger points which she is likely constantly reactivating with her workout regimens.  Initial instigating event may have been this T8 compression fracture.    Migraines.    - Botox migraine and thoracic paraspinal injections performed as above.  There were no complications and she tolerated the procedure well.  She was provided with postprocedure instructions.  -Continue Diclofenac patch as needed  -Consider other medications in the future   -consider injections: repeat Trigger point injections as needed, botox, and botox for migraines, Ultrasound-guided intercostal nerve block, referral to Dr. Matt for erector spinae blocks  -Consider repeat thoracic MRI  -Follow-up after 7/9/2025 for repeat migraine Botox injections.  Increased dose ordered for thoracic paraspinals as well.        The patient expressed understanding and agreement with the assessment and plan. Patient encouraged to contact us should they have any questions, concerns, or any changes in symptoms.     Thank you for allowing me to participate in the care of your patient.      ** Dictated with voice recognition software, please forgive any errors in grammar and/or spelling **

## 2025-07-16 NOTE — PATIENT INSTRUCTIONS
-Ice on and off for the next 24 hours if injection sites are sore. Do gentle range of motion exercises in each area that was injected. Try to do them every hour for about half a minute or so, in every direction that the affected part goes. No pool, bath, or hot tub today. Avoid heavy lifting for the next 2 days.    -Continue Diclofenac patch as needed  -Consider other medications in the future   -consider injections: repeat Trigger point injections as needed, botox, and botox for migraines, Ultrasound-guided intercostal nerve block, referral to Dr. Matt for erector spinae blocks  -Consider repeat thoracic MRI  -Follow-up 90 days for repeat Botox injections, I will order 300 units again, but if something changes, let me know by messaging or come back sooner

## 2025-07-17 ENCOUNTER — APPOINTMENT (OUTPATIENT)
Dept: PHYSICAL MEDICINE AND REHAB | Facility: CLINIC | Age: 50
End: 2025-07-17
Payer: COMMERCIAL

## 2025-07-17 VITALS
DIASTOLIC BLOOD PRESSURE: 61 MMHG | BODY MASS INDEX: 19.12 KG/M2 | HEART RATE: 70 BPM | WEIGHT: 112 LBS | SYSTOLIC BLOOD PRESSURE: 90 MMHG | TEMPERATURE: 97.7 F | OXYGEN SATURATION: 100 % | HEIGHT: 64 IN

## 2025-07-17 DIAGNOSIS — S29.012A STRAIN OF MID-BACK, INITIAL ENCOUNTER: ICD-10-CM

## 2025-07-17 DIAGNOSIS — G58.8 INTERCOSTAL NEURALGIA: ICD-10-CM

## 2025-07-17 DIAGNOSIS — G89.29 CHRONIC MIDLINE THORACIC BACK PAIN: ICD-10-CM

## 2025-07-17 DIAGNOSIS — G24.9 DYSTONIA: ICD-10-CM

## 2025-07-17 DIAGNOSIS — M79.18 MYOFASCIAL PAIN: ICD-10-CM

## 2025-07-17 DIAGNOSIS — G43.809 OTHER MIGRAINE WITHOUT STATUS MIGRAINOSUS, NOT INTRACTABLE: Primary | ICD-10-CM

## 2025-07-17 DIAGNOSIS — M54.6 CHRONIC MIDLINE THORACIC BACK PAIN: ICD-10-CM

## 2025-07-17 DIAGNOSIS — R25.2 SPASTICITY: ICD-10-CM

## 2025-07-17 PROCEDURE — 64646 CHEMODENERV TRUNK MUSC 1-5: CPT | Performed by: PHYSICAL MEDICINE & REHABILITATION

## 2025-07-17 PROCEDURE — 95874 GUIDE NERV DESTR NEEDLE EMG: CPT | Performed by: PHYSICAL MEDICINE & REHABILITATION

## 2025-07-17 PROCEDURE — 64615 CHEMODENERV MUSC MIGRAINE: CPT | Performed by: PHYSICAL MEDICINE & REHABILITATION

## 2025-07-17 ASSESSMENT — PAIN SCALES - GENERAL: PAINLEVEL_OUTOF10: 2

## 2025-07-21 ENCOUNTER — ANESTHESIA EVENT (OUTPATIENT)
Dept: OPERATING ROOM | Facility: HOSPITAL | Age: 50
End: 2025-07-21
Payer: COMMERCIAL

## 2025-07-25 ENCOUNTER — TELEPHONE (OUTPATIENT)
Dept: PHYSICAL MEDICINE AND REHAB | Facility: CLINIC | Age: 50
End: 2025-07-25

## 2025-07-25 ENCOUNTER — PRE-ADMISSION TESTING (OUTPATIENT)
Dept: PREADMISSION TESTING | Facility: HOSPITAL | Age: 50
End: 2025-07-25
Payer: COMMERCIAL

## 2025-07-25 VITALS
HEIGHT: 64 IN | BODY MASS INDEX: 18.52 KG/M2 | WEIGHT: 108.47 LBS | SYSTOLIC BLOOD PRESSURE: 104 MMHG | TEMPERATURE: 97.3 F | HEART RATE: 75 BPM | DIASTOLIC BLOOD PRESSURE: 71 MMHG | RESPIRATION RATE: 20 BRPM | OXYGEN SATURATION: 99 %

## 2025-07-25 DIAGNOSIS — Z01.818 PRE-OPERATIVE EXAM: Primary | ICD-10-CM

## 2025-07-25 DIAGNOSIS — M22.2X2 PATELLOFEMORAL DISORDERS, LEFT KNEE: ICD-10-CM

## 2025-07-25 DIAGNOSIS — M17.12 OSTEOARTHRITIS OF LEFT KNEE, UNSPECIFIED OSTEOARTHRITIS TYPE: ICD-10-CM

## 2025-07-25 LAB
ABO GROUP (TYPE) IN BLOOD: NORMAL
ANTIBODY SCREEN: NORMAL
RH FACTOR (ANTIGEN D): NORMAL

## 2025-07-25 PROCEDURE — 99205 OFFICE O/P NEW HI 60 MIN: CPT | Performed by: REGISTERED NURSE

## 2025-07-25 PROCEDURE — 36415 COLL VENOUS BLD VENIPUNCTURE: CPT

## 2025-07-25 PROCEDURE — 87081 CULTURE SCREEN ONLY: CPT | Mod: GEALAB

## 2025-07-25 PROCEDURE — 86901 BLOOD TYPING SEROLOGIC RH(D): CPT

## 2025-07-25 RX ORDER — CHLORHEXIDINE GLUCONATE ORAL RINSE 1.2 MG/ML
15 SOLUTION DENTAL DAILY
Qty: 120 ML | Refills: 0 | Status: SHIPPED | OUTPATIENT
Start: 2025-07-25 | End: 2025-07-27

## 2025-07-25 ASSESSMENT — ENCOUNTER SYMPTOMS
EYES NEGATIVE: 1
GASTROINTESTINAL NEGATIVE: 1
CARDIOVASCULAR NEGATIVE: 1
NEUROLOGICAL NEGATIVE: 1
RESPIRATORY NEGATIVE: 1
CONSTITUTIONAL NEGATIVE: 1
LIMITED RANGE OF MOTION: 1
NECK NEGATIVE: 1
ARTHRALGIAS: 1

## 2025-07-25 ASSESSMENT — LIFESTYLE VARIABLES: SMOKING_STATUS: NONSMOKER

## 2025-07-25 ASSESSMENT — PAIN SCALES - GENERAL: PAINLEVEL_OUTOF10: 0 - NO PAIN

## 2025-07-25 ASSESSMENT — DUKE ACTIVITY SCORE INDEX (DASI)
CAN YOU DO LIGHT WORK AROUND THE HOUSE LIKE DUSTING OR WASHING DISHES: YES
TOTAL_SCORE: 58.2
CAN YOU WALK INDOORS, SUCH AS AROUND YOUR HOUSE: YES
CAN YOU DO YARD WORK LIKE RAKING LEAVES, WEEDING OR PUSHING A MOWER: YES
CAN YOU PARTICIPATE IN MODERATE RECREATIONAL ACTIVITIES LIKE GOLF, BOWLING, DANCING, DOUBLES TENNIS OR THROWING A BASEBALL OR FOOTBALL: YES
CAN YOU WALK A BLOCK OR TWO ON LEVEL GROUND: YES
CAN YOU DO HEAVY WORK AROUND THE HOUSE LIKE SCRUBBING FLOORS OR LIFTING AND MOVING HEAVY FURNITURE: YES
CAN YOU CLIMB A FLIGHT OF STAIRS OR WALK UP A HILL: YES
CAN YOU PARTICIPATE IN STRENOUS SPORTS LIKE SWIMMING, SINGLES TENNIS, FOOTBALL, BASKETBALL, OR SKIING: YES
CAN YOU RUN A SHORT DISTANCE: YES
CAN YOU TAKE CARE OF YOURSELF (EAT, DRESS, BATHE, OR USE TOILET): YES
CAN YOU DO MODERATE WORK AROUND THE HOUSE LIKE VACUUMING, SWEEPING FLOORS OR CARRYING GROCERIES: YES
CAN YOU HAVE SEXUAL RELATIONS: YES
DASI METS SCORE: 9.9

## 2025-07-25 ASSESSMENT — PAIN - FUNCTIONAL ASSESSMENT: PAIN_FUNCTIONAL_ASSESSMENT: 0-10

## 2025-07-25 NOTE — H&P (VIEW-ONLY)
CPM/PAT Evaluation       Name: Sienna Acuña (Sienna Sims  /Age: 1975/50 y.o.     In-Person       Chief Complaint: Evaluation prior to surgery    HPI    Medical History[1]    Surgical History[2]    Patient  reports being sexually active and has had partner(s) who are male. She reports using the following methods of birth control/protection: Post-menopausal and Surgical.    Family History[3]    Allergies[4]    Prior to Admission medications   Medication Sig Start Date End Date Taking? Authorizing Provider   buPROPion XL (Wellbutrin XL) 300 mg 24 hr tablet Take 1 tablet (300 mg) by mouth once daily in the morning. 10/7/23  Yes Historical Provider, MD   cholecalciferol (Vitamin D-3) 50,000 unit capsule Take 1 capsule (50,000 Units) by mouth every 30 (thirty) days. 23  Yes Historical Provider, MD   Ibsrela 50 mg tablet tablet Take 1 tablet (50 mg) by mouth 2 times a day. 24  Yes Historical Provider, MD   lamoTRIgine (LaMICtal) 100 mg tablet Take 1,258 mg by mouth 2 times a day. 25  Yes Historical Provider, MD   levothyroxine (Synthroid, Levoxyl) 100 mcg tablet Take 1 tablet (100 mcg) by mouth early in the morning.. Take on an empty stomach at the same time each day, either 30 to 60 minutes prior to breakfast 25 Yes Carrol Yepez,    liothyronine (Cytomel) 5 mcg tablet Take 1 tablet (5 mcg) by mouth 2 times a day.   Yes Historical Provider, MD   Myrbetriq 50 mg tablet extended release 24 hr 24 hr tablet Take 1 tablet by mouth once daily 25  Yes STEFFI Mcdaniels-LAURENM, ND   onabotulinumtoxinA (Botox) 100 unit injection Provider to inject a total of 300 units into the muscles every 90 days for migraine prevention and dystonia. For office use only. 25  Yes Loretta Olson MD   Ozempic 0.25 mg or 0.5 mg (2 mg/3 mL) pen injector  3/11/25  Yes Historical Provider, MD   potassium chloride CR (Klor-Con) 10 mEq ER tablet Take 1 tablet (10 mEq) by mouth once daily. Do not  crush, chew, or split. 5/28/25 5/28/26 Yes Carrol Yepez DO   Ubrelvy 100 mg tablet tablet Take 1 tablet (100 mg) by mouth. 1/30/25 1/30/26 Yes Historical Provider, MD   vilazodone (Viibryd) 40 mg tablet Take 1 tablet (40 mg) by mouth once daily with breakfast. 12/30/24  Yes Historical Provider, MD   ziprasidone (Geodon) 20 mg capsule Take 1 capsule (20 mg) by mouth 2 times daily (morning and late afternoon). 10/30/24  Yes Historical Provider, MD   chlorhexidine (Peridex) 0.12 % solution Use 15 mL in the mouth or throat once daily for 2 days. Use 15ml once the night before surgery and 15ml once the morning of surgery 7/25/25 7/27/25  STEFFI Hall-CNP   diclofenac epolamine 1.3 % patch APPLY 1  TOPICALLY TO SKIN ONCE DAILY  Patient taking differently: Place 1 patch on the skin once daily as needed. 1/7/25   Loretta Olson MD   estradiol (Estrace) 0.01 % (0.1 mg/gram) vaginal cream Apply pea-sized amount of cream to affected area twice a week.  Patient not taking: Reported on 7/25/2025 7/15/24   STEFFI Mcdaniels-KARMEN, ND   ondansetron (Zofran) 4 mg tablet Take 1 tablet (4 mg) by mouth every 8 hours if needed for nausea or vomiting. 10/3/23   Historical Provider, MD HUTCHINSON ROS:   Constitutional:   neg    Neuro/Psych:   neg    Eyes:   neg    Ears:   neg    Nose:   Mouth:   neg    Throat:   neg    Neck:   neg    Cardio:   neg    Respiratory:   neg    Endocrine:   GI:   neg    :   neg    Musculoskeletal:    Left knee pain 3/10   arthralgias   decreased ROM  Hematologic:   neg    Skin:      Physical Exam  Vitals reviewed.   Constitutional:       Appearance: Normal appearance.   HENT:      Head: Normocephalic and atraumatic.      Mouth/Throat:      Mouth: Mucous membranes are moist.      Pharynx: Oropharynx is clear.   Neck:      Vascular: No carotid bruit.     Cardiovascular:      Rate and Rhythm: Normal rate and regular rhythm.      Heart sounds: Normal heart sounds.   Pulmonary:      Effort:  "Pulmonary effort is normal.      Breath sounds: Normal breath sounds.   Abdominal:      Palpations: Abdomen is soft.     Musculoskeletal:         General: Tenderness present.      Cervical back: Normal range of motion and neck supple.      Comments: Left knee     Skin:     General: Skin is warm and dry.      Capillary Refill: Capillary refill takes less than 2 seconds.     Neurological:      General: No focal deficit present.      Mental Status: She is alert and oriented to person, place, and time.     Psychiatric:         Mood and Affect: Mood normal.         Behavior: Behavior normal.         Thought Content: Thought content normal.         Judgment: Judgment normal.          PAT AIRWAY:   Airway:     Neck ROM::  Full  normal        Visit Vitals  /71   Pulse 75   Temp 36.3 °C (97.3 °F) (Temporal)   Resp 20   Ht 1.626 m (5' 4\")   Wt 49.2 kg (108 lb 7.5 oz)   SpO2 99%   BMI 18.62 kg/m²   OB Status Hysterectomy   Smoking Status Never   BSA 1.49 m²       DASI Risk Score      Flowsheet Row Pre-Admission Testing from 7/25/2025 in [facility] Questionnaire Series Submission from 7/17/2025 in [facility] OR with Generic Provider Yolanda   Can you take care of yourself (eat, dress, bathe, or use toilet)?  2.75 filed at 07/25/2025 0945 2.75  filed at 07/17/2025 1613   Can you walk indoors, such as around your house? 1.75 filed at 07/25/2025 0945 1.75  filed at 07/17/2025 1613   Can you walk a block or two on level ground?  2.75 filed at 07/25/2025 0945 2.75  filed at 07/17/2025 1613   Can you climb a flight of stairs or walk up a hill? 5.5 filed at 07/25/2025 0945 5.5  filed at 07/17/2025 1613   Can you run a short distance? 8 filed at 07/25/2025 0945 8  filed at 07/17/2025 1613   Can you do light work around the house like dusting or washing dishes? 2.7 filed at 07/25/2025 0945 2.7  filed at 07/17/2025 1613   Can you do moderate work around the house like vacuuming, sweeping floors or " carrying groceries? 3.5 filed at 07/25/2025 0945 3.5  filed at 07/17/2025 1613   Can you do heavy work around the house like scrubbing floors or lifting and moving heavy furniture?  8 filed at 07/25/2025 0945 8  filed at 07/17/2025 1613   Can you do yard work like raking leaves, weeding or pushing a mower? 4.5 filed at 07/25/2025 0945 4.5  filed at 07/17/2025 1613   Can you have sexual relations? 5.25 filed at 07/25/2025 0945 5.25  filed at 07/17/2025 1613   Can you participate in moderate recreational activities like golf, bowling, dancing, doubles tennis or throwing a baseball or football? 6 filed at 07/25/2025 0945 6  filed at 07/17/2025 1613   Can you participate in strenous sports like swimming, singles tennis, football, basketball, or skiing? 7.5 filed at 07/25/2025 0945 7.5  filed at 07/17/2025 1613   DASI SCORE 58.2 filed at 07/25/2025 0945 58.2  filed at 07/17/2025 1613   METS Score (Will be calculated only when all the questions are answered) 9.9 filed at 07/25/2025 0945 9.9  filed at 07/17/2025 1613     Caprini DVT Assessment      Flowsheet Row Pre-Admission Testing from 7/25/2025 in [facility] to Hosp-Admission (Discharged) from 1/16/2025 in [facility] with Wilfredo Manriquez MD and Phuong Yañez, DO   DVT Score (IF A SCORE IS NOT CALCULATING, MUST SELECT A BMI TO COMPLETE) 11 filed at 07/25/2025 1321 2 filed at 01/16/2025 1911   Medical Factors Inflammatory bowel disease filed at 07/25/2025 1321 --   Surgical Factors Elective major lower extremity arthroplasty, Major surgery planned, lasting 2-3 hours filed at 07/25/2025 1321 --   Labs/Test Results --  [Platelet disorder, Von Willebrand] filed at 07/25/2025 1321 --   BMI (BMI MUST BE CHOSEN) 30 or less filed at 07/25/2025 1321 30 or less filed at 01/16/2025 1911     Modified Frailty Index    No data to display  [id number] Stroke Risk Points  Current as of 16 minutes ago        N/A 0 to 9 Points:      Last  Change: N/A          The [id number] risk score (Lip GH, et al. 2009. © 2010 [employer]) quantifies the risk of stroke for a patient with atrial fibrillation. For patients without atrial fibrillation or under the age of 18 this score appears as N/A. Higher score values generally indicate higher risk of stroke.        This score is not applicable to this patient. Components are not calculated.          Revised Cardiac Risk Index      Flowsheet Row Pre-Admission Testing from 7/25/2025 in [facility]   High-Risk Surgery (Intraperitoneal, Intrathoracic,Suprainguinal vascular) 0 filed at 07/25/2025 1324   History of ischemic heart disease (History of MI, History of positive exercuse test, Current chest paint considered due to myocardial ischemia, Use of nitrate therapy, ECG with pathological Q Waves) 0 filed at 07/25/2025 1324   History of congestive heart failure (pulmonary edemia, bilateral rales or S3 gallop, Paroxysmal nocturnal dyspnea, CXR showing pulmonary vascular redistribution) 0 filed at 07/25/2025 1324   History of cerebrovascular disease (Prior TIA or stroke) 0 filed at 07/25/2025 1324   Pre-operative insulin treatment 0 filed at 07/25/2025 1324   Pre-operative creatinine>2 mg/dl 0 filed at 07/25/2025 1324   Revised Cardiac Risk Calculator 0 filed at 07/25/2025 1324     Apfel Simplified Score      Flowsheet Row Pre-Admission Testing from 7/25/2025 in [facility]   Smoking status 1 filed at 07/25/2025 1325   History of motion sickness or PONV  1 filed at 07/25/2025 1325   Use of postoperative opioids 1 filed at 07/25/2025 1325   Gender - Female 1=Yes filed at 07/25/2025 1325   Apfel Simplified Score Calculator 4 filed at 07/25/2025 1325     Risk Analysis Index Results This Encounter    No data found in the last 10 encounters.       Stop Bang Score      Flowsheet Row Pre-Admission Testing from 7/25/2025 in [facility] Questionnaire Series  Submission from 7/17/2025 in [facility] OR with Generic Provider Yolanda   Do you snore loudly? 0 filed at 07/25/2025 0944 0  filed at 07/17/2025 1613   Do you often feel tired or fatigued after your sleep? 0 filed at 07/25/2025 0944 0  filed at 07/17/2025 1613   Has anyone ever observed you stop breathing in your sleep? 0 filed at 07/25/2025 0944 0  filed at 07/17/2025 1613   Do you have or are you being treated for high blood pressure? 0 filed at 07/25/2025 0944 0  filed at 07/17/2025 1613   Recent BMI (Calculated) 19.2 filed at 07/25/2025 0944 19.2  filed at 07/17/2025 1613   Is BMI greater than 35 kg/m2? 0=No filed at 07/25/2025 0944 0=No  filed at 07/17/2025 1613   Age older than 50 years old? 0=No filed at 07/25/2025 0944 0=No  filed at 07/17/2025 1613   Is your neck circumference greater than 17 inches (Male) or 16 inches (Female)? 0 filed at 07/25/2025 0944 --   Gender - Male 0=No filed at 07/25/2025 0944 0=No  filed at 07/17/2025 1613   STOP-BANG Total Score 0 filed at 07/25/2025 0944 --     Prodigy: High Risk  Total Score: 0          ARISCAT Score for Postoperative Pulmonary Complications    No data to display  [place] Perioperative Risk for Myocardial Infarction or Cardiac Arrest (GARCIA)    No data to display      Results for orders placed or performed in visit on 07/25/25 (from the past 24 hours)   Type And Screen Is this order related to pregnancy or an upcoming surgery? Yes; Where will this surgery/delivery be performed? [facility]; What is the date of the surgery? 8/4/2025; Has this patient ever had a transfusion? Yes; Date of ...   Result Value Ref Range    ABO TYPE O     Rh TYPE POS     ANTIBODY SCREEN NEG         Assessment & Plan:    50 y.o.  female  scheduled for ARTHROPLASTY, KNEE, PARTIAL - Left  OSTEOTOMY, TIBIAL TUBEROSITY - Left  RECONSTRUCTION, PATELLA - Left  ARTHROSCOPY, KNEE, WITH OPEN REPAIR - Left on 8/4/25 with Dr. Green for  Primary osteoarthritis of left knee,  Patellofemoral disorder of left knee .  PMHX includes Von willebrand, Platelet disorder, Anemia, CKD, hypothyroidism.  PAT consulted for perioperative risk stratification and optimization    Neuro:  Hx depression, anxiety on wellbutrin  Patient is at increased risk for perioperative CVA secondary to  increased age    HEENT:  No HEENT diagnosis or significant findings on chart review or clinical presentation and evaluation. No further preoperative testing/intervention indicated at this time.    Cardiovascular:  No CV diagnosis or significant findings on chart review or clinical presentation and evaluation. No further preoperative testing or intervention is indicated at this time.  METS: 9.9  RCRI: 0 points, 3.9%  risk for postoperative MACE   GARCIA: 0.1% risk for 30 day postoperative MACE  EKG - 1/16/25  Normal sinus rhythm  Normal ECG    Pulmonary:  No pulmonary diagnosis, however patient is at increased risk of perioperative complications secondary to  types of anesthetic  Stop Bang score is 0 placing patient at low risk for ANNETTE  ARISCAT: <26 points, 1.6% risk of in-hospital postoperative pulmonary complication  PRODIGY: Low risk for opioid induced respiratory depression    Pulmonary education discussed. Patient provided deep breathing exercises and educational handout.    Urological/GYN/Renal  No diagnosis or significant findings on chart review or clinical presentation and evaluation.     Endocrine:  Hx hypothyroidism on levothyroxine, reactive hypoglycemia    On Ozempic reactive hypoglycemia, NPO and stoppage instructions given   A1C 1/18/25 4.3    Hematologic:  Hx Von Willebrand disease, Anemia, Blood transfusion, bleeding complications with previous surgeries- workup showed platelet defect    Follows with Hematology [facility]  Visit 7/7/25 CBC CMP PT PTT fibrinogen and von Willebrand factor ristocetin cofactor or an alcohol GP 1B level  Discussed with Norma Anesthesia Degeorge recommendation and plan for day of  surgery DDAVP, patient aware    Antiplatelet management   The patient is not currently receiving antiplatelet therapy.  Anticoagulation management  The patient is not currently receiving anticoagulation therapy. Patient provided with DVT educational handout.    Caprini Score 11, patient at high risk for perioperative DVT.  Patient provided with VTE education/handout.    Gastrointestinal:   No GI diagnosis or significant findings on chart review or clinical presentation and evaluation.   Eat-10 score 0      Infectious disease:   No infectious diagnosis or significant findings on chart review or clinical presentation and evaluation.   Prescription provided for CHG body wash and dental rinse. CHG use instructions reviewed and provided to patient.  Staph screen collected    Musculoskeletal:   Primary osteoarthritis of left knee, Patellofemoral disorder of left knee     Anesthesia/Airway:  PONV      Medication instructions and NPO guidelines reviewed with the patient.  All questions or concerns discussed and addressed.      Labs ordered   MRSA  Peridex   T&S         [1]   Past Medical History:  Diagnosis Date    Abnormal CT of thoracic spine 03/31/2017    Abnormal platelet function test (Multi) 01/28/2014    Anemia     Past    Anxiety     Baker's cyst     Baker's cyst of knee, left     Blood in stool 04/16/2015    Colonoscopy & EGD tomorrow  Will speak with Q3 regarding procedures tomorrow   Bowel prep tonight      Bright red blood per rectum 08/22/2017    Chilblain lupus     Chronic kidney disease 01/01/2025    CKD (chronic kidney disease)     Clotting disorder (Multi)     Depression     Disease of thyroid gland     Hypothyroidism    Gastritis 01/2022    Hematuria 03/08/2016    History of blood transfusion     Hypothyroidism 2005    IBS (irritable bowel syndrome)     Infection due to fungus 11/14/2024    Inflammatory bowel disease     Irritation of vulva 11/14/2024    Left thigh pain 11/20/2014    Migraines     Motion  sickness     Sea sickness kotion sickness    Muscle strain of gluteal region 2014    OAB (overactive bladder)     Osteopenia     Overactive bladder     Platelet disorder (Multi)     Platelet dysfunction    PONV (postoperative nausea and vomiting)     Positive JENNIFER (antinuclear antibody)     Pyelonephritis 2024      Swelling of extremity 2024    Von Willebrand disease (Multi)    [2]   Past Surgical History:  Procedure Laterality Date    BREAST SURGERY Bilateral     AUGMENTATION     SECTION, CLASSIC  2013     Section     SECTION, LOW TRANSVERSE  10-7-2003    CHOLECYSTECTOMY      COLON SURGERY      COLONOSCOPY      CT ABDOMEN PELVIS ANGIOGRAM W AND/OR WO IV CONTRAST  2014    CT ABDOMEN PELVIS ANGIOGRAM W AND/OR WO IV CONTRAST 2014 GEN ANCILLARY LEGACY    ESOPHAGOGASTRODUODENOSCOPY  2022    HYSTERECTOMY  04/10/2013    Hysterectomy    KNEE ARTHROPLASTY  2024    KNEE ARTHROSCOPY W/ DEBRIDEMENT Left 10/30/2024    Left knee scope AJD, debridement of bakers cyst via posteromedial portal    KNEE ARTHROSCOPY, MEDIAL PATELLO FEMORAL LIGAMENT REPAIR Left     LASIK      AR BREAST AUGMENTATION WITH IMPLANT      SIGMOIDECTOMY      2019    SIGMOIDECTOMY      TUBAL LIGATION  2013    Tubal Ligation   [3]   Family History  Problem Relation Name Age of Onset    Diabetes Other grandparent     Other (thyroid disorder) Other grandparent     Endometriosis Other aunt     Cancer Other      Diabetes Other      Endometriosis Other      Other (thyroid disorder) Other      Blood Disorder Mother Mom     Cancer Mother Mom     Clotting disorder Mother Mom     Colon cancer Mother Mom     Arthritis Maternal Grandmother Grandma     Kidney disease Maternal Grandmother Grandma     Diabetes Paternal Grandfather Maximilian Vargas     Diabetes Brother Mando Vargas 40 - 49    Stroke Brother Mando Vargas     Kidney disease Maternal Grandmother Grandma     Arthritis Maternal  Grandmother Grandma     Clotting disorder Mother Mom     Cancer Mother Mom     Colon cancer Mother Mom     Blood Disorder Mother Mom     Diabetes Brother Mando Vargas     Diabetes Paternal Grandfather Erickcris Alicia     Stroke Brother Mando Vargas     Asthma Other Son Jez duff 0 - 9   [4]   Allergies  Allergen Reactions    Alendronate Unknown     Possible allergy   Inc bleeding with this medication

## 2025-07-25 NOTE — PREPROCEDURE INSTRUCTIONS
Medication List            Accurate as of July 25, 2025 10:04 AM. Always use your most recent med list.                buPROPion  mg 24 hr tablet  Commonly known as: Wellbutrin XL  Medication Adjustments for Surgery: Take/Use as prescribed     chlorhexidine 0.12 % solution  Commonly known as: Peridex  Use 15 mL in the mouth or throat once daily for 2 days. Use 15ml once the night before surgery and 15ml once the morning of surgery  Medication Adjustments for Surgery: Take/Use as prescribed     cholecalciferol 1.25 mg (50,000 units) capsule  Commonly known as: Vitamin D-3  Additional Medication Adjustments for Surgery: Take last dose 7 days before surgery     diclofenac epolamine 1.3 % patch  APPLY 1  TOPICALLY TO SKIN ONCE DAILY  Medication Adjustments for Surgery: Do Not take on the morning of surgery     estradiol 0.01 % (0.1 mg/gram) vaginal cream  Commonly known as: Estrace  Apply pea-sized amount of cream to affected area twice a week.  Medication Adjustments for Surgery: Do Not take on the morning of surgery     Ibsrela 50 mg tablet tablet  Generic drug: tenapanor  Medication Adjustments for Surgery: Do Not take on the morning of surgery     lamoTRIgine 100 mg tablet  Commonly known as: LaMICtal  Medication Adjustments for Surgery: Take/Use as prescribed     levothyroxine 100 mcg tablet  Commonly known as: Synthroid, Levoxyl  Take 1 tablet (100 mcg) by mouth early in the morning.. Take on an empty stomach at the same time each day, either 30 to 60 minutes prior to breakfast  Medication Adjustments for Surgery: Take/Use as prescribed     liothyronine 5 mcg tablet  Commonly known as: Cytomel  Medication Adjustments for Surgery: Take/Use as prescribed     Myrbetriq 50 mg tablet extended release 24 hr 24 hr tablet  Generic drug: mirabegron  Take 1 tablet by mouth once daily  Medication Adjustments for Surgery: Do Not take on the morning of surgery     onabotulinumtoxinA 100 unit injection  Commonly known  as: Botox  Provider to inject a total of 300 units into the muscles every 90 days for migraine prevention and dystonia. For office use only.     ondansetron 4 mg tablet  Commonly known as: Zofran  Medication Adjustments for Surgery: Take/Use as prescribed     Ozempic 0.25 mg or 0.5 mg (2 mg/3 mL) pen injector  Generic drug: semaglutide  Medication Adjustments for Surgery: Do Not take on the morning of surgery     potassium chloride CR 10 mEq ER tablet  Commonly known as: Klor-Con  Take 1 tablet (10 mEq) by mouth once daily. Do not crush, chew, or split.  Medication Adjustments for Surgery: Take/Use as prescribed     Ubrelvy 100 mg tablet  Generic drug: ubrogepant  Medication Adjustments for Surgery: Do Not take on the morning of surgery     vilazodone 40 mg tablet  Commonly known as: Viibryd  Medication Adjustments for Surgery: Take/Use as prescribed     ziprasidone 20 mg capsule  Commonly known as: Geodon  Medication Adjustments for Surgery: Take/Use as prescribed                                  Thank you for visiting [facility]) today for your pre-procedure evaluation, you were seen by     RENÉ Riley  Pre Admission [facility]  [facility]  [phone]    This summary includes instructions and information to aid you during your perioperative period.  Please read carefully. If you have any questions about your visit today, please call the number listed above.  If you become ill or have any changes to your health before your surgery, please contact your primary care provider and alert your surgeon.      Preparing for your Surgery       Exercises  Preoperative Deep Breathing Exercises  Why it is important to do deep breathing exercises before my surgery?  Deep breathing exercises strengthen your breathing muscles.  This helps you to recover after your surgery and decreases the chance of breathing complications.  How are the deep breathing exercises done?  Sit straight with  your back supported.  Breathe in deeply and slowly through your nose. Your lower rib cage should expand and your abdomen may move forward.  Hold that breath for 3 to 5 seconds.  Breathe out through pursed lips, slowly and completely.  Rest and repeat 10 times every hour while awake.  Rest longer if you become dizzy or lightheaded.       Preoperative Brain Exercises    What are brain exercises?  A brain exercise is any activity that engages your thinking (cognitive) skills.    What types of activities are considered brain exercises?  Jigsaw puzzles, crossword puzzles, word jumble, memory games, word search, and many more.  Many can be found free online or on your phone via a mobile bhumika.    Why should I do brain exercises before my surgery?  More recent research has shown brain exercise before surgery can lower the risk of postoperative delirium (confusion) which can be especially important for older adults.  Patients who did brain exercises for 5 to 10 hours the days before surgery, cut their risk of postoperative delirium in half up to 1 week after surgery.    Sit-to-Stand Exercise    What is the sit-to-stand exercise?  The sit-to-stand exercise strengthens the muscles of your lower body and muscles in the center of your body (core muscles for stability) helping to maintain and improve your strength and mobility.  How do I do the sit-to-stand exercise?  The goal is to do this exercise without using your arms or hands.  If this is too difficult, use your arms and hands or a chair with armrests to help slowly push yourself to the standing position and lower yourself back to the sitting position. As the movement becomes easier use your arms and hands less.    Steps to the sit-to-stand exercise  Sit up tall in a sturdy chair, knees bent, feet flat on the floor shoulder-width apart.  Shift your hips/pelvis forward in the chair to correctly position yourself for the next movement.  Lean forward at your hips.  Stand up  straight putting equal weight on both feet.  Check to be sure you are properly aligned with the chair, in a slow controlled movement sit back down.  Repeat this exercise 10-15 times.  If needed you can do it fewer times until your strength improves.  Rest for 1 minute.  Do another 10-15 sit-to-stand exercises.  Try to do this in the morning and evening.        Instructions    Preoperative Fasting Guidelines    Why must I stop eating and drinking near surgery time?  With sedation, food or liquid in your stomach can enter your lungs causing serious complications  Food can increase nausea and vomiting  When do I need to stop eating and drinking before my surgery?      Preoperative Fasting Guidelines  Why must I stop eating and drinking before surgery?   With anesthesia, food or liquid in your stomach can enter your  lungs causing serious complications   GLP-1 medications can slow the movement of food through your  stomach and intestines. This further increases the risk of food  entering your lungs with anesthesia  When do I need to stop eating and drinking before my  surgery?   To help ensure food has passed out of your stomach, START a  clear liquid diet 24 hours before your surgery   On the day of your surgery/procedure, STOP all clear liquids 2  hours before your arrival time to the hospital/facility    A clear liquid diet consists of clear liquids and foods that melt  into a clear liquid (i.e. gelatin) and excludes solid foods and  liquids you cannot see through (i.e. milk). Clears can and  should contain sugar to obtain a sufficient number of calories.    A clear liquid diet includes   Clear, fat-free broth   Clear nutritional drinks   Pulp-free popsicles, vegetable and fruit juice   Gelatin   Coffee and tea without creamer or milk   Clear soda and sports drinks  Diabetic Patients   Clear liquids should not be sugar-free   Check your blood glucose levels as you normally do   If you have symptoms of low blood  glucose (shakiness, sweating,  dizziness, confusion) or high blood glucose (dry mouth, excessive  thirst, frequent urination, blurry vision), check your blood glucose  level   For low blood glucose increase your consumption of sugarcontaining clear liquid   For high blood glucose, decrease your consumption of sugarcontaining clears and treat as you normally would   If symptoms persist seek medical attention            Simple things you can do to help prevent blood clots     Blood clots are blockages that can form in the body's veins. When a blood clot forms in your deep veins, it may be called a deep vein thrombosis, or DVT for short. Blood clots can happen in any part of the body where blood flows, but they are most common in the arms and legs. If a piece of a blood clot breaks free and travels to the lungs, it is called a pulmonary embolus (PE). A PE can be a very serious problem.         Being in the hospital or having surgery can raise your chances of getting a blood clot because you may not be well enough to move around as much as you normally do.         Ways you can help prevent blood clots in the hospital       Wearing SCDs  SCDs stands for Sequential Compression Devices.   SCDs are special sleeves that wrap around your legs. They attach to a pump that fills them with air to gently squeeze your legs every few minutes.  This helps return the blood in your legs to your heart.   SCDs should only be taken off when walking or bathing. SCDs may not be comfortable, but they can help save your life.              Pump SCD leg sleeves  Wearing compression stockings - if your doctor orders them. These special snug-fitting stockings gently squeeze your legs to help blood flow.       Walking. Walking helps move the blood in your legs.   If your doctor says it is ok, try walking the halls at least   5 times a day. Ask us to help you get up, so you don't fall.      Taking any blood-thinning medicines your doctor orders.     "          Ways you can help prevent blood clots at home         Wearing compression stockings - if your doctor orders them.   Walking - to help move the blood in your legs.    Taking any blood-thinning medicines your doctor orders.      Signs of a blood clot or PE    Tell your doctor or nurse right away if you have any of the problems listed below.         If you are at home, seek medical care right away. Call 911 for chest pain or problems breathing.            Signs of a blood clot (DVT) - such as pain, swelling, redness, or warmth in your arm or legs.  Signs of a pulmonary embolism (PE) - such as chest pain or feeling short of breath      Tobacco and Alcohol;  Do not drink alcohol or smoke within 24 hours of surgery.  It is best to quit smoking for as long as possible before any surgery or procedure.    Other Instructions  Why did I have my nose, under my arms, and groin swabbed? The purpose of the swab is to identify Staphylococcus aureus inside your nose or on your skin.  The swab was sent to the laboratory for culture.  A positive swab/culture for Staphylococcus aureus is called colonization or carriage.     What is Staphylococcus aureus? Staphylococcus aureus, also known as \"staph\", is a germ found on the skin or in the nose of healthy people.  Sometimes Staphylococcus aureus can get into the body and cause an infection.  This can be minor (such as pimples, boils, or other skin problems).  It might also be serious (such as a blood infection, pneumonia, or a surgical site infection).     What is Staphylococcus aureus colonization or carriage? Colonization or carriage means that a person has the germ but is not sick from it.  These bacteria can be spread on the hands or when breathing or sneezing.   How is Staphylococcus aureus spread? It is most often spread by close contact with a person or item that carries it.   What happens if my culture is positive for Staphylococcus aureus? Your doctor/medical team will " use this information to guide any antibiotic treatment which may be necessary.  Regardless of the culture results, we will clean the inside of your nose with a betadine swab just before you have your surgery.   Will I get an infection if I have Staphylococcus aureus in my nose or on my skin? Anyone can get an infection with Staphylococcus aureus.  However, the best way to reduce your risk of infection is to follow the instructions provided to you for the use of your CHG soap and dental rinse.      Body Wash:     What is a home preoperative antibacterial shower? This shower is a way of cleaning the skin with a germ-killing solution before surgery.  The solution contains chlorhexidine, commonly known as CHG.  CHG is a skin cleanser with germ-killing ability.  Let your doctor know if you are allergic to chlorhexidine.   Why do I need to take a preoperative antibacterial shower? Skin is not sterile.  It is best to try to make your skin as free of germs as possible before surgery.  Proper cleansing with a germ-killing soap before surgery can lower the number of germs on your skin.  This helps to reduce the risk of infection at the surgical site.    Following the instructions listed below will help you prepare your skin for surgery.   How do I use the solution? Steps:  Begin using your CHG soap 5 days before your scheduled surgery.      Oral/Dental Rinse:     What is oral/dental rinse?  It is a mouthwash. It is a way of cleaning the mouth with a germ-killing solution before your surgery.  The solution contains chlorhexidine, commonly known as CHG. It is used inside the mouth to kill a bacteria known as Staphylococcus aureus.  Let your doctor know if you are allergic to Chlorhexidine.   Why do I need to use CHG oral/dental rinse? The CHG oral/dental rinse helps to kill a bacteria in your mouth known as Staphylococcus aureus.  This reduces the risk of infection at the surgical site.    Using your CHG oral/dental rinse  STEPS: Use your CHG oral/dental rinse after you brush your teeth the night before (at bedtime) and the morning of your surgery.  Follow all directions on your prescription label.    Use the cap on the container to measure 15 ml.  Swish (gargle if you can) the mouthwash in your mouth for at least 30 seconds, (do not swallow) and spit out.  After you use your CHG rinse, do not rinse your mouth with water, drink or eat.    Please refer to the prescription label for the appropriate time to resume oral intake   What side effects might I have using the CHG oral/dental rinse? CHG rinse will stick to plaque on the teeth.  Brush and floss just before use.  Teeth brushing will help avoid staining of plaque during use.     The Week before Surgery        Seven days before Surgery  Check your PAT medication instructions  Do the exercises provided to you by PAT  Arrange for a responsible, adult licensed  to take you home after surgery and stay with you for 24 hours.  You will not be permitted to drive yourself home if you have received any anesthetic/sedation  Six days before surgery  Check your PAT medication instructions  Do the exercises provided to you by PAT  Start using Chlorhexidene (CHG) body wash if prescribed  Five days before surgery  Check your PAT medication instructions  Do the exercises provided to you by PAT  Continue to use CHG body wash if prescribed  Three days before surgery  Check your PAT medication instructions  Do the exercises provided to you by PAT  Continue to use CHG body wash if prescribed  Two days before surgery  Check your PAT medication instructions  Do the exercises provided to you by PAT  Continue to use CHG body wash if prescribed    The Day before Surgery       Check your PAT medication and all other PAT instructions including when to stop eating and drinking  You will be called with your arrival time for surgery in the late afternoon.  If you do not receive a call please reach out to  [facility]. [phone]  Do not smoke or drink 24 hours before surgery  Prepare items to bring with you to the hospital  Shower with your chlorhexidine wash if prescribed  Brush your teeth and use your chlorhexidine dental rinse if prescribed    The Day of Surgery       Check your PAT medication instructions  Ensure you follow the instructions for when to stop eating and drinking  Shower, if prescribed use CHG.  Do not apply any lotions, creams, moisturizers, perfume or deodorant  Brush your teeth and use your CHG dental rinse if prescribed  Wear loose comfortable clothing  Avoid make-up  Remove  jewelry and piercings, consider professional piercing removal with a plastic spacer if needed  Bring photo ID and Insurance card  Bring an accurate medication list that includes medication dose, frequency and allergies  Bring a copy of your advanced directives (will, health care power of )  Bring any devices and controllers as well as medical devices you have been provided with for surgery (CPAP, slings, braces, etc.)  Dentures, eyeglasses, and contacts will be removed before surgery, please bring cases for contacts or glasses

## 2025-07-25 NOTE — TELEPHONE ENCOUNTER
"Kaelyn Lombardo  Thank you, Janet!          Previous Messages       ----- Message -----  From: Janet Almanzar  Sent: 7/25/2025  11:47 AM EDT  To: Yesenia Lombardo; Kaelyn Green,    Per portal the claim for DOS:7/17/25 is set to pay $312.68 claim#[id number].    thanks  ----- Message -----  From: Kaelyn Mojica  Sent: 7/25/2025  11:22 AM EDT  To: Janet Almanzar; Yesenia Patterson,    We've been experiencing issues with [place] denying claims for patient supplied medication and are disputing them on our end when they deny for no authorization.    Janet - The authorization for the patient supplied medication is in the media tab and was scanned on 03/28/25. Can you please review and submit a dispute to [place] for auth?    Thank you,  Kaelyn  ----- Message -----  From: Yesenia Lombardo  Sent: 7/25/2025  11:04 AM EDT  To: Kaelyn Art,  I'm getting a denial for this patients botox from what looks like her medical benefits.  She is patient supplied through our  [facility].  I scanned the denial in under date 7/17/25 \"insurance benefits\".  Are you able to assist?  Thank you!  Yesenia"

## 2025-07-25 NOTE — CPM/PAT H&P
CPM/PAT Evaluation       Name: Sienna Acuña (Sienna Sims  /Age: 1975/50 y.o.     In-Person       Chief Complaint: Evaluation prior to surgery    HPI    Medical History[1]    Surgical History[2]    Patient  reports being sexually active and has had partner(s) who are male. She reports using the following methods of birth control/protection: Post-menopausal and Surgical.    Family History[3]    Allergies[4]    Prior to Admission medications   Medication Sig Start Date End Date Taking? Authorizing Provider   buPROPion XL (Wellbutrin XL) 300 mg 24 hr tablet Take 1 tablet (300 mg) by mouth once daily in the morning. 10/7/23  Yes Historical Provider, MD   cholecalciferol (Vitamin D-3) 50,000 unit capsule Take 1 capsule (50,000 Units) by mouth every 30 (thirty) days. 23  Yes Historical Provider, MD   Ibsrela 50 mg tablet tablet Take 1 tablet (50 mg) by mouth 2 times a day. 24  Yes Historical Provider, MD   lamoTRIgine (LaMICtal) 100 mg tablet Take 1,258 mg by mouth 2 times a day. 25  Yes Historical Provider, MD   levothyroxine (Synthroid, Levoxyl) 100 mcg tablet Take 1 tablet (100 mcg) by mouth early in the morning.. Take on an empty stomach at the same time each day, either 30 to 60 minutes prior to breakfast 25 Yes Carrol Yepez,    liothyronine (Cytomel) 5 mcg tablet Take 1 tablet (5 mcg) by mouth 2 times a day.   Yes Historical Provider, MD   Myrbetriq 50 mg tablet extended release 24 hr 24 hr tablet Take 1 tablet by mouth once daily 25  Yes STEFFI Mcdaniels-LAURENM, ND   onabotulinumtoxinA (Botox) 100 unit injection Provider to inject a total of 300 units into the muscles every 90 days for migraine prevention and dystonia. For office use only. 25  Yes Loretta Olson MD   Ozempic 0.25 mg or 0.5 mg (2 mg/3 mL) pen injector  3/11/25  Yes Historical Provider, MD   potassium chloride CR (Klor-Con) 10 mEq ER tablet Take 1 tablet (10 mEq) by mouth once daily. Do not  crush, chew, or split. 5/28/25 5/28/26 Yes Carrol Yepez DO   Ubrelvy 100 mg tablet tablet Take 1 tablet (100 mg) by mouth. 1/30/25 1/30/26 Yes Historical Provider, MD   vilazodone (Viibryd) 40 mg tablet Take 1 tablet (40 mg) by mouth once daily with breakfast. 12/30/24  Yes Historical Provider, MD   ziprasidone (Geodon) 20 mg capsule Take 1 capsule (20 mg) by mouth 2 times daily (morning and late afternoon). 10/30/24  Yes Historical Provider, MD   chlorhexidine (Peridex) 0.12 % solution Use 15 mL in the mouth or throat once daily for 2 days. Use 15ml once the night before surgery and 15ml once the morning of surgery 7/25/25 7/27/25  STEFFI Hall-CNP   diclofenac epolamine 1.3 % patch APPLY 1  TOPICALLY TO SKIN ONCE DAILY  Patient taking differently: Place 1 patch on the skin once daily as needed. 1/7/25   Loretta Olson MD   estradiol (Estrace) 0.01 % (0.1 mg/gram) vaginal cream Apply pea-sized amount of cream to affected area twice a week.  Patient not taking: Reported on 7/25/2025 7/15/24   STEFFI Mcdaniels-KARMEN, ND   ondansetron (Zofran) 4 mg tablet Take 1 tablet (4 mg) by mouth every 8 hours if needed for nausea or vomiting. 10/3/23   Historical Provider, MD HUTCHINSON ROS:   Constitutional:   neg    Neuro/Psych:   neg    Eyes:   neg    Ears:   neg    Nose:   Mouth:   neg    Throat:   neg    Neck:   neg    Cardio:   neg    Respiratory:   neg    Endocrine:   GI:   neg    :   neg    Musculoskeletal:    Left knee pain 3/10   arthralgias   decreased ROM  Hematologic:   neg    Skin:      Physical Exam  Vitals reviewed.   Constitutional:       Appearance: Normal appearance.   HENT:      Head: Normocephalic and atraumatic.      Mouth/Throat:      Mouth: Mucous membranes are moist.      Pharynx: Oropharynx is clear.   Neck:      Vascular: No carotid bruit.     Cardiovascular:      Rate and Rhythm: Normal rate and regular rhythm.      Heart sounds: Normal heart sounds.   Pulmonary:      Effort:  "Pulmonary effort is normal.      Breath sounds: Normal breath sounds.   Abdominal:      Palpations: Abdomen is soft.     Musculoskeletal:         General: Tenderness present.      Cervical back: Normal range of motion and neck supple.      Comments: Left knee     Skin:     General: Skin is warm and dry.      Capillary Refill: Capillary refill takes less than 2 seconds.     Neurological:      General: No focal deficit present.      Mental Status: She is alert and oriented to person, place, and time.     Psychiatric:         Mood and Affect: Mood normal.         Behavior: Behavior normal.         Thought Content: Thought content normal.         Judgment: Judgment normal.          PAT AIRWAY:   Airway:     Neck ROM::  Full  normal        Visit Vitals  /71   Pulse 75   Temp 36.3 °C (97.3 °F) (Temporal)   Resp 20   Ht 1.626 m (5' 4\")   Wt 49.2 kg (108 lb 7.5 oz)   SpO2 99%   BMI 18.62 kg/m²   OB Status Hysterectomy   Smoking Status Never   BSA 1.49 m²       DASI Risk Score      Flowsheet Row Pre-Admission Testing from 7/25/2025 in [facility] Questionnaire Series Submission from 7/17/2025 in [facility] OR with Generic Provider Yolanda   Can you take care of yourself (eat, dress, bathe, or use toilet)?  2.75 filed at 07/25/2025 0945 2.75  filed at 07/17/2025 1613   Can you walk indoors, such as around your house? 1.75 filed at 07/25/2025 0945 1.75  filed at 07/17/2025 1613   Can you walk a block or two on level ground?  2.75 filed at 07/25/2025 0945 2.75  filed at 07/17/2025 1613   Can you climb a flight of stairs or walk up a hill? 5.5 filed at 07/25/2025 0945 5.5  filed at 07/17/2025 1613   Can you run a short distance? 8 filed at 07/25/2025 0945 8  filed at 07/17/2025 1613   Can you do light work around the house like dusting or washing dishes? 2.7 filed at 07/25/2025 0945 2.7  filed at 07/17/2025 1613   Can you do moderate work around the house like vacuuming, sweeping floors or " carrying groceries? 3.5 filed at 07/25/2025 0945 3.5  filed at 07/17/2025 1613   Can you do heavy work around the house like scrubbing floors or lifting and moving heavy furniture?  8 filed at 07/25/2025 0945 8  filed at 07/17/2025 1613   Can you do yard work like raking leaves, weeding or pushing a mower? 4.5 filed at 07/25/2025 0945 4.5  filed at 07/17/2025 1613   Can you have sexual relations? 5.25 filed at 07/25/2025 0945 5.25  filed at 07/17/2025 1613   Can you participate in moderate recreational activities like golf, bowling, dancing, doubles tennis or throwing a baseball or football? 6 filed at 07/25/2025 0945 6  filed at 07/17/2025 1613   Can you participate in strenous sports like swimming, singles tennis, football, basketball, or skiing? 7.5 filed at 07/25/2025 0945 7.5  filed at 07/17/2025 1613   DASI SCORE 58.2 filed at 07/25/2025 0945 58.2  filed at 07/17/2025 1613   METS Score (Will be calculated only when all the questions are answered) 9.9 filed at 07/25/2025 0945 9.9  filed at 07/17/2025 1613     Caprini DVT Assessment      Flowsheet Row Pre-Admission Testing from 7/25/2025 in [facility] ED to Hosp-Admission (Discharged) from 1/16/2025 in [facility] South with Wilfredo Manriquez MD and Phuong Yañez, DO   DVT Score (IF A SCORE IS NOT CALCULATING, MUST SELECT A BMI TO COMPLETE) 11 filed at 07/25/2025 1321 2 filed at 01/16/2025 1911   Medical Factors Inflammatory bowel disease filed at 07/25/2025 1321 --   Surgical Factors Elective major lower extremity arthroplasty, Major surgery planned, lasting 2-3 hours filed at 07/25/2025 1321 --   Labs/Test Results --  [Platelet disorder, Von Willebrand] filed at 07/25/2025 1321 --   BMI (BMI MUST BE CHOSEN) 30 or less filed at 07/25/2025 1321 30 or less filed at 01/16/2025 1911     Modified Frailty Index    No data to display  [id number] Stroke Risk Points  Current as of 16 minutes ago        N/A 0 to 9 Points:      Last  Change: N/A          The [id number] risk score (Lip GH, et al. 2009. © 2010 [employer]) quantifies the risk of stroke for a patient with atrial fibrillation. For patients without atrial fibrillation or under the age of 18 this score appears as N/A. Higher score values generally indicate higher risk of stroke.        This score is not applicable to this patient. Components are not calculated.          Revised Cardiac Risk Index      Flowsheet Row Pre-Admission Testing from 7/25/2025 in [facility]   High-Risk Surgery (Intraperitoneal, Intrathoracic,Suprainguinal vascular) 0 filed at 07/25/2025 1324   History of ischemic heart disease (History of MI, History of positive exercuse test, Current chest paint considered due to myocardial ischemia, Use of nitrate therapy, ECG with pathological Q Waves) 0 filed at 07/25/2025 1324   History of congestive heart failure (pulmonary edemia, bilateral rales or S3 gallop, Paroxysmal nocturnal dyspnea, CXR showing pulmonary vascular redistribution) 0 filed at 07/25/2025 1324   History of cerebrovascular disease (Prior TIA or stroke) 0 filed at 07/25/2025 1324   Pre-operative insulin treatment 0 filed at 07/25/2025 1324   Pre-operative creatinine>2 mg/dl 0 filed at 07/25/2025 1324   Revised Cardiac Risk Calculator 0 filed at 07/25/2025 1324     Apfel Simplified Score      Flowsheet Row Pre-Admission Testing from 7/25/2025 in [facility]   Smoking status 1 filed at 07/25/2025 1325   History of motion sickness or PONV  1 filed at 07/25/2025 1325   Use of postoperative opioids 1 filed at 07/25/2025 1325   Gender - Female 1=Yes filed at 07/25/2025 1325   Apfel Simplified Score Calculator 4 filed at 07/25/2025 1325     Risk Analysis Index Results This Encounter    No data found in the last 10 encounters.       Stop Bang Score      Flowsheet Row Pre-Admission Testing from 7/25/2025 in [facility] Questionnaire Series  Submission from 7/17/2025 in [facility] OR with Generic Provider Yolanda   Do you snore loudly? 0 filed at 07/25/2025 0944 0  filed at 07/17/2025 1613   Do you often feel tired or fatigued after your sleep? 0 filed at 07/25/2025 0944 0  filed at 07/17/2025 1613   Has anyone ever observed you stop breathing in your sleep? 0 filed at 07/25/2025 0944 0  filed at 07/17/2025 1613   Do you have or are you being treated for high blood pressure? 0 filed at 07/25/2025 0944 0  filed at 07/17/2025 1613   Recent BMI (Calculated) 19.2 filed at 07/25/2025 0944 19.2  filed at 07/17/2025 1613   Is BMI greater than 35 kg/m2? 0=No filed at 07/25/2025 0944 0=No  filed at 07/17/2025 1613   Age older than 50 years old? 0=No filed at 07/25/2025 0944 0=No  filed at 07/17/2025 1613   Is your neck circumference greater than 17 inches (Male) or 16 inches (Female)? 0 filed at 07/25/2025 0944 --   Gender - Male 0=No filed at 07/25/2025 0944 0=No  filed at 07/17/2025 1613   STOP-BANG Total Score 0 filed at 07/25/2025 0944 --     Prodigy: High Risk  Total Score: 0          ARISCAT Score for Postoperative Pulmonary Complications    No data to display  [place] Perioperative Risk for Myocardial Infarction or Cardiac Arrest (GARCIA)    No data to display      Results for orders placed or performed in visit on 07/25/25 (from the past 24 hours)   Type And Screen Is this order related to pregnancy or an upcoming surgery? Yes; Where will this surgery/delivery be performed? [facility]; What is the date of the surgery? 8/4/2025; Has this patient ever had a transfusion? Yes; Date of ...   Result Value Ref Range    ABO TYPE O     Rh TYPE POS     ANTIBODY SCREEN NEG         Assessment & Plan:    50 y.o.  female  scheduled for ARTHROPLASTY, KNEE, PARTIAL - Left  OSTEOTOMY, TIBIAL TUBEROSITY - Left  RECONSTRUCTION, PATELLA - Left  ARTHROSCOPY, KNEE, WITH OPEN REPAIR - Left on 8/4/25 with Dr. Green for  Primary osteoarthritis of left knee,  Patellofemoral disorder of left knee .  PMHX includes Von willebrand, Platelet disorder, Anemia, CKD, hypothyroidism.  PAT consulted for perioperative risk stratification and optimization    Neuro:  Hx depression, anxiety on wellbutrin  Patient is at increased risk for perioperative CVA secondary to  increased age    HEENT:  No HEENT diagnosis or significant findings on chart review or clinical presentation and evaluation. No further preoperative testing/intervention indicated at this time.    Cardiovascular:  No CV diagnosis or significant findings on chart review or clinical presentation and evaluation. No further preoperative testing or intervention is indicated at this time.  METS: 9.9  RCRI: 0 points, 3.9%  risk for postoperative MACE   GARCIA: 0.1% risk for 30 day postoperative MACE  EKG - 1/16/25  Normal sinus rhythm  Normal ECG    Pulmonary:  No pulmonary diagnosis, however patient is at increased risk of perioperative complications secondary to  types of anesthetic  Stop Bang score is 0 placing patient at low risk for ANNETTE  ARISCAT: <26 points, 1.6% risk of in-hospital postoperative pulmonary complication  PRODIGY: Low risk for opioid induced respiratory depression    Pulmonary education discussed. Patient provided deep breathing exercises and educational handout.    Urological/GYN/Renal  No diagnosis or significant findings on chart review or clinical presentation and evaluation.     Endocrine:  Hx hypothyroidism on levothyroxine, reactive hypoglycemia    On Ozempic reactive hypoglycemia, NPO and stoppage instructions given   A1C 1/18/25 4.3    Hematologic:  Hx Von Willebrand disease, Anemia, Blood transfusion, bleeding complications with previous surgeries- workup showed platelet defect    Follows with Hematology [facility]  Visit 7/7/25 CBC CMP PT PTT fibrinogen and von Willebrand factor ristocetin cofactor or an alcohol GP 1B level  Discussed with Norma Anesthesia Degeorge recommendation and plan for day of  surgery DDAVP, patient aware    Antiplatelet management   The patient is not currently receiving antiplatelet therapy.  Anticoagulation management  The patient is not currently receiving anticoagulation therapy. Patient provided with DVT educational handout.    Caprini Score 11, patient at high risk for perioperative DVT.  Patient provided with VTE education/handout.    Gastrointestinal:   No GI diagnosis or significant findings on chart review or clinical presentation and evaluation.   Eat-10 score 0      Infectious disease:   No infectious diagnosis or significant findings on chart review or clinical presentation and evaluation.   Prescription provided for CHG body wash and dental rinse. CHG use instructions reviewed and provided to patient.  Staph screen collected    Musculoskeletal:   Primary osteoarthritis of left knee, Patellofemoral disorder of left knee     Anesthesia/Airway:  PONV      Medication instructions and NPO guidelines reviewed with the patient.  All questions or concerns discussed and addressed.      Labs ordered   MRSA  Peridex   T&S         [1]   Past Medical History:  Diagnosis Date    Abnormal CT of thoracic spine 03/31/2017    Abnormal platelet function test (Multi) 01/28/2014    Anemia     Past    Anxiety     Baker's cyst     Baker's cyst of knee, left     Blood in stool 04/16/2015    Colonoscopy & EGD tomorrow  Will speak with Q3 regarding procedures tomorrow   Bowel prep tonight      Bright red blood per rectum 08/22/2017    Chilblain lupus     Chronic kidney disease 01/01/2025    CKD (chronic kidney disease)     Clotting disorder (Multi)     Depression     Disease of thyroid gland     Hypothyroidism    Gastritis 01/2022    Hematuria 03/08/2016    History of blood transfusion     Hypothyroidism 2005    IBS (irritable bowel syndrome)     Infection due to fungus 11/14/2024    Inflammatory bowel disease     Irritation of vulva 11/14/2024    Left thigh pain 11/20/2014    Migraines     Motion  sickness     Sea sickness kotion sickness    Muscle strain of gluteal region 2014    OAB (overactive bladder)     Osteopenia     Overactive bladder     Platelet disorder (Multi)     Platelet dysfunction    PONV (postoperative nausea and vomiting)     Positive JENNIFER (antinuclear antibody)     Pyelonephritis 2024      Swelling of extremity 2024    Von Willebrand disease (Multi)    [2]   Past Surgical History:  Procedure Laterality Date    BREAST SURGERY Bilateral     AUGMENTATION     SECTION, CLASSIC  2013     Section     SECTION, LOW TRANSVERSE  10-7-2003    CHOLECYSTECTOMY      COLON SURGERY      COLONOSCOPY      CT ABDOMEN PELVIS ANGIOGRAM W AND/OR WO IV CONTRAST  2014    CT ABDOMEN PELVIS ANGIOGRAM W AND/OR WO IV CONTRAST 2014 GEN ANCILLARY LEGACY    ESOPHAGOGASTRODUODENOSCOPY  2022    HYSTERECTOMY  04/10/2013    Hysterectomy    KNEE ARTHROPLASTY  2024    KNEE ARTHROSCOPY W/ DEBRIDEMENT Left 10/30/2024    Left knee scope AJD, debridement of bakers cyst via posteromedial portal    KNEE ARTHROSCOPY, MEDIAL PATELLO FEMORAL LIGAMENT REPAIR Left     LASIK      WI BREAST AUGMENTATION WITH IMPLANT      SIGMOIDECTOMY      2019    SIGMOIDECTOMY      TUBAL LIGATION  2013    Tubal Ligation   [3]   Family History  Problem Relation Name Age of Onset    Diabetes Other grandparent     Other (thyroid disorder) Other grandparent     Endometriosis Other aunt     Cancer Other      Diabetes Other      Endometriosis Other      Other (thyroid disorder) Other      Blood Disorder Mother Mom     Cancer Mother Mom     Clotting disorder Mother Mom     Colon cancer Mother Mom     Arthritis Maternal Grandmother Grandma     Kidney disease Maternal Grandmother Grandma     Diabetes Paternal Grandfather Maximilian Vargas     Diabetes Brother Mando Vargas 40 - 49    Stroke Brother Mando Vargas     Kidney disease Maternal Grandmother Grandma     Arthritis Maternal  Grandmother Grandma     Clotting disorder Mother Mom     Cancer Mother Mom     Colon cancer Mother Mom     Blood Disorder Mother Mom     Diabetes Brother Mando Vargas     Diabetes Paternal Grandfather Erickcris Alicia     Stroke Brother Mando Vargas     Asthma Other Son Jez duff 0 - 9   [4]   Allergies  Allergen Reactions    Alendronate Unknown     Possible allergy   Inc bleeding with this medication

## 2025-07-26 ENCOUNTER — APPOINTMENT (OUTPATIENT)
Dept: OBSTETRICS AND GYNECOLOGY | Facility: CLINIC | Age: 50
End: 2025-07-26
Payer: COMMERCIAL

## 2025-07-26 VITALS
HEIGHT: 64 IN | HEART RATE: 88 BPM | SYSTOLIC BLOOD PRESSURE: 98 MMHG | DIASTOLIC BLOOD PRESSURE: 66 MMHG | WEIGHT: 110 LBS | BODY MASS INDEX: 18.78 KG/M2

## 2025-07-26 DIAGNOSIS — N32.81 OAB (OVERACTIVE BLADDER): ICD-10-CM

## 2025-07-26 DIAGNOSIS — Z01.419 WELL WOMAN EXAM: Primary | ICD-10-CM

## 2025-07-26 PROCEDURE — 3008F BODY MASS INDEX DOCD: CPT | Performed by: MIDWIFE

## 2025-07-26 PROCEDURE — 99396 PREV VISIT EST AGE 40-64: CPT | Performed by: MIDWIFE

## 2025-07-26 PROCEDURE — 1036F TOBACCO NON-USER: CPT | Performed by: MIDWIFE

## 2025-07-26 RX ORDER — MIRABEGRON 50 MG/1
50 TABLET, FILM COATED, EXTENDED RELEASE ORAL DAILY
Qty: 90 TABLET | Refills: 3 | Status: SHIPPED | OUTPATIENT
Start: 2025-07-26 | End: 2026-07-26

## 2025-07-26 NOTE — PROGRESS NOTES
Subjective   Patient ID: Sienna Acuña is a 50 y.o. female who presents for Annual Exam.  Pt also requests renewal of Rx for Myrbetriq that helps her with urinary frequency/leakage with OAB.  She is no longer using estradiol cream as she says that she notices that she gets a HA the day after using it.  HPI    PMHx: Hyst for endometriosis; bilateral breast augmentation;partial sigmoidectomy with subsequent chronic constipation; bladder sling, OAB, chronic vulvar irritation managed with steroid cream that pt uses only when needed. Mammogram 2024 Neg; 1 vaginal birth and 1 c/s birth  SocHx:  11 yrs to partner of 21 yrs, SA, nonsmoker; pt drinks 2 coffee/day   ROS: no pelvic pain, no urinary c/o, NAD  Review of Systems    Objective   Physical Exam  Vitals and nursing note reviewed.   Constitutional:       Appearance: Normal appearance.   HENT:      Nose: Nose normal.     Eyes:      Pupils: Pupils are equal, round, and reactive to light.     Neck:      Thyroid: No thyroid mass.     Cardiovascular:      Rate and Rhythm: Normal rate and regular rhythm.   Pulmonary:      Effort: Pulmonary effort is normal.      Breath sounds: Normal breath sounds.   Chest:      Chest wall: No mass.   Breasts:     Right: Normal.      Left: Normal.      Comments: Bilateral breast augmentation  Abdominal:      Palpations: Abdomen is soft. There is no mass.      Tenderness: There is no abdominal tenderness.   Genitourinary:     General: Normal vulva.      Labia:         Right: No rash, tenderness or lesion.         Left: No rash, tenderness or lesion.       Vagina: Normal.      Uterus: Absent.       Adnexa: Right adnexa normal and left adnexa normal.     Musculoskeletal:         General: Normal range of motion.   Lymphadenopathy:      Cervical: No cervical adenopathy.      Lower Body: No right inguinal adenopathy. No left inguinal adenopathy.     Skin:     General: Skin is warm and dry.     Neurological:      Mental Status: She is alert  and oriented to person, place, and time.      Motor: Motor function is intact.      Gait: Gait is intact.     Psychiatric:         Mood and Affect: Mood normal.         Assessment/Plan   Diagnoses and all orders for this visit:  Well woman exam    Reassurance given re exam  Mammogram already ordered-- pt reminded to schedule  RTO AE/PRN       STEFFI Mcdaniels-KARMEN, ND 07/26/25 10:40 AM

## 2025-07-27 LAB — STAPHYLOCOCCUS SPEC CULT: NORMAL

## 2025-07-31 RX ORDER — CEFAZOLIN SODIUM 2 G/50ML
2 SOLUTION INTRAVENOUS ONCE
Status: CANCELLED | OUTPATIENT
Start: 2025-07-31 | End: 2025-07-31

## 2025-07-31 RX ORDER — TRANEXAMIC ACID 1 G/10ML
1000 INJECTION, SOLUTION INTRAVENOUS ONCE
Status: CANCELLED | OUTPATIENT
Start: 2025-07-31 | End: 2025-07-31

## 2025-08-01 ENCOUNTER — TELEPHONE (OUTPATIENT)
Dept: INPATIENT UNIT | Facility: HOSPITAL | Age: 50
End: 2025-08-01
Payer: COMMERCIAL

## 2025-08-01 NOTE — PREADMISSION SCREENING NOTE
Pre Surgery Discharge Planning :    Procedure:  Uni knee    Date of procedure:  8/4/2025    Address you will be discharged to:  Address   [address] [place], [place] [id number]       Care Partner:  / ODIN    Current mobility status:  ID    Stairs into house: 3    Stairs inside house: 0    DME:     [facility]  Joint Class:  NO    Same Day Surgery:NO       Primary Insurance:    CR[employer]

## 2025-08-04 ENCOUNTER — HOME HEALTH ADMISSION (OUTPATIENT)
Dept: HOME HEALTH SERVICES | Facility: HOME HEALTH | Age: 50
End: 2025-08-04
Payer: COMMERCIAL

## 2025-08-04 ENCOUNTER — HOSPITAL ENCOUNTER (OUTPATIENT)
Facility: HOSPITAL | Age: 50
Discharge: HOME HEALTH | End: 2025-08-05
Attending: ORTHOPAEDIC SURGERY | Admitting: ORTHOPAEDIC SURGERY
Payer: COMMERCIAL

## 2025-08-04 ENCOUNTER — APPOINTMENT (OUTPATIENT)
Dept: RADIOLOGY | Facility: HOSPITAL | Age: 50
End: 2025-08-04
Payer: COMMERCIAL

## 2025-08-04 ENCOUNTER — ANESTHESIA (OUTPATIENT)
Dept: OPERATING ROOM | Facility: HOSPITAL | Age: 50
End: 2025-08-04
Payer: COMMERCIAL

## 2025-08-04 DIAGNOSIS — M17.12 OSTEOARTHRITIS OF LEFT KNEE, UNSPECIFIED OSTEOARTHRITIS TYPE: Primary | ICD-10-CM

## 2025-08-04 LAB
ABO GROUP (TYPE) IN BLOOD: NORMAL
GLUCOSE BLD MANUAL STRIP-MCNC: 70 MG/DL (ref 74–99)
RH FACTOR (ANTIGEN D): NORMAL

## 2025-08-04 PROCEDURE — 64445 NJX AA&/STRD SCIATIC NRV IMG: CPT | Performed by: ANESTHESIOLOGY

## 2025-08-04 PROCEDURE — C1776 JOINT DEVICE (IMPLANTABLE): HCPCS | Performed by: ORTHOPAEDIC SURGERY

## 2025-08-04 PROCEDURE — 7100000001 HC RECOVERY ROOM TIME - INITIAL BASE CHARGE: Performed by: ORTHOPAEDIC SURGERY

## 2025-08-04 PROCEDURE — 2500000004 HC RX 250 GENERAL PHARMACY W/ HCPCS (ALT 636 FOR OP/ED): Performed by: ORTHOPAEDIC SURGERY

## 2025-08-04 PROCEDURE — A27438 PR ARTHROPLASTY PATELLA WITH IMPLANT: Performed by: ANESTHESIOLOGY

## 2025-08-04 PROCEDURE — 2500000005 HC RX 250 GENERAL PHARMACY W/O HCPCS: Performed by: NURSE PRACTITIONER

## 2025-08-04 PROCEDURE — 2500000002 HC RX 250 W HCPCS SELF ADMINISTERED DRUGS (ALT 637 FOR MEDICARE OP, ALT 636 FOR OP/ED): Performed by: NURSE PRACTITIONER

## 2025-08-04 PROCEDURE — 2500000001 HC RX 250 WO HCPCS SELF ADMINISTERED DRUGS (ALT 637 FOR MEDICARE OP): Performed by: NURSE PRACTITIONER

## 2025-08-04 PROCEDURE — 99223 1ST HOSP IP/OBS HIGH 75: CPT | Performed by: NURSE PRACTITIONER

## 2025-08-04 PROCEDURE — 64447 NJX AA&/STRD FEMORAL NRV IMG: CPT | Performed by: ANESTHESIOLOGY

## 2025-08-04 PROCEDURE — 7100000011 HC EXTENDED STAY RECOVERY HOURLY - NURSING UNIT

## 2025-08-04 PROCEDURE — A27438 PR ARTHROPLASTY PATELLA WITH IMPLANT: Performed by: NURSE ANESTHETIST, CERTIFIED REGISTERED

## 2025-08-04 PROCEDURE — 73560 X-RAY EXAM OF KNEE 1 OR 2: CPT | Mod: LEFT SIDE | Performed by: RADIOLOGY

## 2025-08-04 PROCEDURE — 3700000001 HC GENERAL ANESTHESIA TIME - INITIAL BASE CHARGE: Performed by: ORTHOPAEDIC SURGERY

## 2025-08-04 PROCEDURE — 2720000007 HC OR 272 NO HCPCS: Performed by: ORTHOPAEDIC SURGERY

## 2025-08-04 PROCEDURE — 2780000003 HC OR 278 NO HCPCS: Performed by: ORTHOPAEDIC SURGERY

## 2025-08-04 PROCEDURE — C1713 ANCHOR/SCREW BN/BN,TIS/BN: HCPCS | Performed by: ORTHOPAEDIC SURGERY

## 2025-08-04 PROCEDURE — 3600000010 HC OR TIME - EACH INCREMENTAL 1 MINUTE - PROCEDURE LEVEL FIVE: Performed by: ORTHOPAEDIC SURGERY

## 2025-08-04 PROCEDURE — 9420000001 HC RT PATIENT EDUCATION 5 MIN

## 2025-08-04 PROCEDURE — C1769 GUIDE WIRE: HCPCS | Performed by: ORTHOPAEDIC SURGERY

## 2025-08-04 PROCEDURE — A6213 FOAM DRG >16<=48 SQ IN W/BDR: HCPCS | Performed by: ORTHOPAEDIC SURGERY

## 2025-08-04 PROCEDURE — 2500000004 HC RX 250 GENERAL PHARMACY W/ HCPCS (ALT 636 FOR OP/ED): Performed by: NURSE PRACTITIONER

## 2025-08-04 PROCEDURE — 3700000002 HC GENERAL ANESTHESIA TIME - EACH INCREMENTAL 1 MINUTE: Performed by: ORTHOPAEDIC SURGERY

## 2025-08-04 PROCEDURE — 82947 ASSAY GLUCOSE BLOOD QUANT: CPT

## 2025-08-04 PROCEDURE — 7100000002 HC RECOVERY ROOM TIME - EACH INCREMENTAL 1 MINUTE: Performed by: ORTHOPAEDIC SURGERY

## 2025-08-04 PROCEDURE — 2500000005 HC RX 250 GENERAL PHARMACY W/O HCPCS: Performed by: ANESTHESIOLOGY

## 2025-08-04 PROCEDURE — 2700000047 HC OR 270 NO HCPCS: Performed by: ORTHOPAEDIC SURGERY

## 2025-08-04 PROCEDURE — RXMED WILLOW AMBULATORY MEDICATION CHARGE

## 2025-08-04 PROCEDURE — 2500000004 HC RX 250 GENERAL PHARMACY W/ HCPCS (ALT 636 FOR OP/ED): Performed by: NURSE ANESTHETIST, CERTIFIED REGISTERED

## 2025-08-04 PROCEDURE — 94760 N-INVAS EAR/PLS OXIMETRY 1: CPT

## 2025-08-04 PROCEDURE — 73560 X-RAY EXAM OF KNEE 1 OR 2: CPT | Mod: LT

## 2025-08-04 PROCEDURE — 36415 COLL VENOUS BLD VENIPUNCTURE: CPT | Performed by: ORTHOPAEDIC SURGERY

## 2025-08-04 PROCEDURE — 3600000005 HC OR TIME - INITIAL BASE CHARGE - PROCEDURE LEVEL FIVE: Performed by: ORTHOPAEDIC SURGERY

## 2025-08-04 PROCEDURE — 2500000004 HC RX 250 GENERAL PHARMACY W/ HCPCS (ALT 636 FOR OP/ED): Performed by: ANESTHESIOLOGY

## 2025-08-04 PROCEDURE — 2500000005 HC RX 250 GENERAL PHARMACY W/O HCPCS: Performed by: NURSE ANESTHETIST, CERTIFIED REGISTERED

## 2025-08-04 PROCEDURE — 2500000005 HC RX 250 GENERAL PHARMACY W/O HCPCS: Performed by: ORTHOPAEDIC SURGERY

## 2025-08-04 DEVICE — SIMPLEX® HV WITH GENTAMICIN IS A FAST-SETTING ACRYLIC RESIN WITH ADDITION OF GENTAMICIN SULFATE FOR USE IN BONE SURGERY. MIXING THE TWO SEPARATE STERILE COMPONENTS PRODUCES A DUCTILE BONE CEMENT WHICH, AFTER HARDENING, FIXES THE IMPLANT AND TRANSFERS STRESSES PRODUCED DURING MOVEMENT EVENLY TO THE BONE. SIMPLEX® HV WITH GENTAMICINN CEMENT POWDER ALSO CONTAINS INSOLUBLE ZIRCONIUM DIOXIDE AS AN X-RAY CONTRAST MEDIUM. SIMPLEX® HV WITH GENTAMICIN DOES NOT EMIT A SIGNAL AND DOES NOT POSE A SAFETY RISK IN A MAGNETIC RESONANCE ENVIRONMENT.
Type: IMPLANTABLE DEVICE | Site: KNEE | Status: FUNCTIONAL
Brand: SIMPLEX HV WITH GENTAMICIN

## 2025-08-04 DEVICE — IMPLANTABLE DEVICE: Type: IMPLANTABLE DEVICE | Site: KNEE | Status: FUNCTIONAL

## 2025-08-04 DEVICE — GRAFT, CLARIX CORD 4.0 X 3.0: Type: IMPLANTABLE DEVICE | Site: KNEE | Status: FUNCTIONAL

## 2025-08-04 DEVICE — DISPOSABLES KIT, T3 AMZ
Type: IMPLANTABLE DEVICE | Site: KNEE | Status: FUNCTIONAL
Brand: ARTHREX®

## 2025-08-04 DEVICE — SYMMETRIC PATELLA
Type: IMPLANTABLE DEVICE | Site: KNEE | Status: FUNCTIONAL
Brand: TRIATHLON

## 2025-08-04 RX ORDER — VANCOMYCIN HYDROCHLORIDE 1 G/20ML
INJECTION, POWDER, LYOPHILIZED, FOR SOLUTION INTRAVENOUS AS NEEDED
Status: DISCONTINUED | OUTPATIENT
Start: 2025-08-04 | End: 2025-08-04 | Stop reason: HOSPADM

## 2025-08-04 RX ORDER — MIDAZOLAM HYDROCHLORIDE 1 MG/ML
INJECTION, SOLUTION INTRAMUSCULAR; INTRAVENOUS AS NEEDED
Status: DISCONTINUED | OUTPATIENT
Start: 2025-08-04 | End: 2025-08-04

## 2025-08-04 RX ORDER — GABAPENTIN 300 MG/1
300 CAPSULE ORAL 3 TIMES DAILY PRN
Qty: 90 CAPSULE | Refills: 11 | Status: SHIPPED | OUTPATIENT
Start: 2025-08-04

## 2025-08-04 RX ORDER — LIOTHYRONINE SODIUM 5 UG/1
5 TABLET ORAL 2 TIMES DAILY
Status: DISCONTINUED | OUTPATIENT
Start: 2025-08-04 | End: 2025-08-05 | Stop reason: HOSPADM

## 2025-08-04 RX ORDER — ASPIRIN 81 MG/1
81 TABLET ORAL 2 TIMES DAILY
Status: DISCONTINUED | OUTPATIENT
Start: 2025-08-05 | End: 2025-08-05 | Stop reason: HOSPADM

## 2025-08-04 RX ORDER — ASPIRIN 325 MG
325 TABLET ORAL 2 TIMES DAILY
Qty: 60 TABLET | Refills: 0 | Status: SHIPPED | OUTPATIENT
Start: 2025-08-04 | End: 2025-08-04 | Stop reason: HOSPADM

## 2025-08-04 RX ORDER — POTASSIUM CHLORIDE 20 MEQ/1
10 TABLET, EXTENDED RELEASE ORAL DAILY
Status: DISCONTINUED | OUTPATIENT
Start: 2025-08-04 | End: 2025-08-05 | Stop reason: HOSPADM

## 2025-08-04 RX ORDER — ONDANSETRON 4 MG/1
4 TABLET, ORALLY DISINTEGRATING ORAL EVERY 8 HOURS PRN
Status: DISCONTINUED | OUTPATIENT
Start: 2025-08-04 | End: 2025-08-05 | Stop reason: HOSPADM

## 2025-08-04 RX ORDER — ASCORBIC ACID 500 MG
500 TABLET ORAL 2 TIMES DAILY
Qty: 60 TABLET | Refills: 0 | Status: SHIPPED | OUTPATIENT
Start: 2025-08-04 | End: 2025-09-04

## 2025-08-04 RX ORDER — LIDOCAINE HYDROCHLORIDE 10 MG/ML
INJECTION, SOLUTION EPIDURAL; INFILTRATION; INTRACAUDAL; PERINEURAL
Status: COMPLETED | OUTPATIENT
Start: 2025-08-04 | End: 2025-08-04

## 2025-08-04 RX ORDER — BUPROPION HYDROCHLORIDE 150 MG/1
300 TABLET ORAL EVERY MORNING
Status: DISCONTINUED | OUTPATIENT
Start: 2025-08-05 | End: 2025-08-05 | Stop reason: HOSPADM

## 2025-08-04 RX ORDER — CEFAZOLIN SODIUM 2 G/100ML
2 INJECTION, SOLUTION INTRAVENOUS EVERY 6 HOURS
Status: COMPLETED | OUTPATIENT
Start: 2025-08-04 | End: 2025-08-04

## 2025-08-04 RX ORDER — ALBUTEROL SULFATE 0.83 MG/ML
2.5 SOLUTION RESPIRATORY (INHALATION) ONCE AS NEEDED
Status: DISCONTINUED | OUTPATIENT
Start: 2025-08-04 | End: 2025-08-04 | Stop reason: HOSPADM

## 2025-08-04 RX ORDER — MORPHINE SULFATE 2 MG/ML
2 INJECTION, SOLUTION INTRAMUSCULAR; INTRAVENOUS EVERY 4 HOURS PRN
Status: DISCONTINUED | OUTPATIENT
Start: 2025-08-04 | End: 2025-08-05 | Stop reason: HOSPADM

## 2025-08-04 RX ORDER — CARISOPRODOL 350 MG/1
350 TABLET ORAL 3 TIMES DAILY PRN
Status: DISCONTINUED | OUTPATIENT
Start: 2025-08-04 | End: 2025-08-05 | Stop reason: HOSPADM

## 2025-08-04 RX ORDER — ONDANSETRON HYDROCHLORIDE 2 MG/ML
INJECTION, SOLUTION INTRAVENOUS AS NEEDED
Status: DISCONTINUED | OUTPATIENT
Start: 2025-08-04 | End: 2025-08-04

## 2025-08-04 RX ORDER — NALOXONE HYDROCHLORIDE 0.4 MG/ML
0.2 INJECTION, SOLUTION INTRAMUSCULAR; INTRAVENOUS; SUBCUTANEOUS EVERY 5 MIN PRN
Status: DISCONTINUED | OUTPATIENT
Start: 2025-08-04 | End: 2025-08-05 | Stop reason: HOSPADM

## 2025-08-04 RX ORDER — VECURONIUM BROMIDE 1 MG/ML
INJECTION, POWDER, LYOPHILIZED, FOR SOLUTION INTRAVENOUS AS NEEDED
Status: DISCONTINUED | OUTPATIENT
Start: 2025-08-04 | End: 2025-08-04

## 2025-08-04 RX ORDER — ZIPRASIDONE HYDROCHLORIDE 20 MG/1
40 CAPSULE ORAL
Status: DISCONTINUED | OUTPATIENT
Start: 2025-08-04 | End: 2025-08-04

## 2025-08-04 RX ORDER — ZIPRASIDONE HYDROCHLORIDE 20 MG/1
40 CAPSULE ORAL
Status: DISCONTINUED | OUTPATIENT
Start: 2025-08-04 | End: 2025-08-05 | Stop reason: HOSPADM

## 2025-08-04 RX ORDER — ASCORBIC ACID 500 MG
500 TABLET ORAL 2 TIMES DAILY
Status: DISCONTINUED | OUTPATIENT
Start: 2025-08-04 | End: 2025-08-05 | Stop reason: HOSPADM

## 2025-08-04 RX ORDER — CARISOPRODOL 350 MG/1
350 TABLET ORAL 3 TIMES DAILY PRN
Qty: 20 TABLET | Refills: 0 | Status: SHIPPED | OUTPATIENT
Start: 2025-08-04

## 2025-08-04 RX ORDER — NORETHINDRONE AND ETHINYL ESTRADIOL 0.5-0.035
KIT ORAL AS NEEDED
Status: DISCONTINUED | OUTPATIENT
Start: 2025-08-04 | End: 2025-08-04

## 2025-08-04 RX ORDER — POLYETHYLENE GLYCOL 3350 17 G/17G
17 POWDER, FOR SOLUTION ORAL DAILY
Status: DISCONTINUED | OUTPATIENT
Start: 2025-08-04 | End: 2025-08-05 | Stop reason: HOSPADM

## 2025-08-04 RX ORDER — OXYCODONE AND ACETAMINOPHEN 5; 325 MG/1; MG/1
1 TABLET ORAL EVERY 4 HOURS PRN
Qty: 30 TABLET | Refills: 0 | Status: SHIPPED | OUTPATIENT
Start: 2025-08-04

## 2025-08-04 RX ORDER — OXYCODONE HYDROCHLORIDE 10 MG/1
10 TABLET ORAL EVERY 6 HOURS PRN
Status: DISCONTINUED | OUTPATIENT
Start: 2025-08-04 | End: 2025-08-05 | Stop reason: HOSPADM

## 2025-08-04 RX ORDER — TRANEXAMIC ACID 10 MG/ML
INJECTION, SOLUTION INTRAVENOUS AS NEEDED
Status: DISCONTINUED | OUTPATIENT
Start: 2025-08-04 | End: 2025-08-04

## 2025-08-04 RX ORDER — LIOTHYRONINE SODIUM 5 UG/1
5 TABLET ORAL 2 TIMES DAILY
Status: DISCONTINUED | OUTPATIENT
Start: 2025-08-04 | End: 2025-08-04

## 2025-08-04 RX ORDER — OXYBUTYNIN CHLORIDE 5 MG/1
5 TABLET ORAL 3 TIMES DAILY
Status: DISCONTINUED | OUTPATIENT
Start: 2025-08-04 | End: 2025-08-05 | Stop reason: HOSPADM

## 2025-08-04 RX ORDER — ACETAMINOPHEN 325 MG/1
650 TABLET ORAL EVERY 4 HOURS PRN
Status: DISCONTINUED | OUTPATIENT
Start: 2025-08-04 | End: 2025-08-04 | Stop reason: HOSPADM

## 2025-08-04 RX ORDER — VILAZODONE HYDROCHLORIDE 20 MG/1
40 TABLET ORAL
Status: DISCONTINUED | OUTPATIENT
Start: 2025-08-05 | End: 2025-08-05 | Stop reason: HOSPADM

## 2025-08-04 RX ORDER — SODIUM CHLORIDE, SODIUM LACTATE, POTASSIUM CHLORIDE, CALCIUM CHLORIDE 600; 310; 30; 20 MG/100ML; MG/100ML; MG/100ML; MG/100ML
100 INJECTION, SOLUTION INTRAVENOUS CONTINUOUS
Status: DISCONTINUED | OUTPATIENT
Start: 2025-08-04 | End: 2025-08-05 | Stop reason: HOSPADM

## 2025-08-04 RX ORDER — LAMOTRIGINE 100 MG/1
100 TABLET ORAL 2 TIMES DAILY
Status: DISCONTINUED | OUTPATIENT
Start: 2025-08-04 | End: 2025-08-05 | Stop reason: HOSPADM

## 2025-08-04 RX ORDER — ONDANSETRON HYDROCHLORIDE 2 MG/ML
8 INJECTION, SOLUTION INTRAVENOUS ONCE
Status: DISCONTINUED | OUTPATIENT
Start: 2025-08-04 | End: 2025-08-04 | Stop reason: HOSPADM

## 2025-08-04 RX ORDER — SODIUM CHLORIDE, SODIUM LACTATE, POTASSIUM CHLORIDE, CALCIUM CHLORIDE 600; 310; 30; 20 MG/100ML; MG/100ML; MG/100ML; MG/100ML
100 INJECTION, SOLUTION INTRAVENOUS CONTINUOUS
Status: ACTIVE | OUTPATIENT
Start: 2025-08-04 | End: 2025-08-04

## 2025-08-04 RX ORDER — BUPIVACAINE HCL/EPINEPHRINE 0.5-1:200K
VIAL (ML) INJECTION
Status: COMPLETED | OUTPATIENT
Start: 2025-08-04 | End: 2025-08-04

## 2025-08-04 RX ORDER — ONDANSETRON HYDROCHLORIDE 2 MG/ML
4 INJECTION, SOLUTION INTRAVENOUS EVERY 8 HOURS PRN
Status: DISCONTINUED | OUTPATIENT
Start: 2025-08-04 | End: 2025-08-05 | Stop reason: HOSPADM

## 2025-08-04 RX ORDER — NAPROXEN SODIUM 220 MG/1
81 TABLET, FILM COATED ORAL 2 TIMES DAILY
Qty: 56 TABLET | Refills: 0 | Status: SHIPPED | OUTPATIENT
Start: 2025-08-04 | End: 2025-09-02

## 2025-08-04 RX ORDER — LEVOTHYROXINE SODIUM 100 UG/1
100 TABLET ORAL DAILY
Status: DISCONTINUED | OUTPATIENT
Start: 2025-08-05 | End: 2025-08-05 | Stop reason: HOSPADM

## 2025-08-04 RX ORDER — ACETAMINOPHEN 325 MG/1
650 TABLET ORAL EVERY 6 HOURS SCHEDULED
Status: DISCONTINUED | OUTPATIENT
Start: 2025-08-04 | End: 2025-08-05 | Stop reason: HOSPADM

## 2025-08-04 RX ORDER — OXYCODONE HYDROCHLORIDE 5 MG/1
5 TABLET ORAL EVERY 6 HOURS PRN
Status: DISCONTINUED | OUTPATIENT
Start: 2025-08-04 | End: 2025-08-05 | Stop reason: HOSPADM

## 2025-08-04 RX ORDER — PROPOFOL 10 MG/ML
INJECTION, EMULSION INTRAVENOUS AS NEEDED
Status: DISCONTINUED | OUTPATIENT
Start: 2025-08-04 | End: 2025-08-04

## 2025-08-04 RX ORDER — GABAPENTIN 300 MG/1
300 CAPSULE ORAL 3 TIMES DAILY PRN
Status: DISCONTINUED | OUTPATIENT
Start: 2025-08-04 | End: 2025-08-05 | Stop reason: HOSPADM

## 2025-08-04 RX ORDER — DOCUSATE SODIUM 100 MG/1
100 CAPSULE, LIQUID FILLED ORAL 2 TIMES DAILY
Status: DISCONTINUED | OUTPATIENT
Start: 2025-08-04 | End: 2025-08-05 | Stop reason: HOSPADM

## 2025-08-04 RX ORDER — LIDOCAINE HYDROCHLORIDE 40 MG/ML
INJECTION, SOLUTION RETROBULBAR AS NEEDED
Status: DISCONTINUED | OUTPATIENT
Start: 2025-08-04 | End: 2025-08-04

## 2025-08-04 RX ORDER — CEFAZOLIN 1 G/1
INJECTION, POWDER, FOR SOLUTION INTRAVENOUS AS NEEDED
Status: DISCONTINUED | OUTPATIENT
Start: 2025-08-04 | End: 2025-08-04

## 2025-08-04 RX ORDER — SODIUM CHLORIDE 0.9 G/100ML
INJECTION, SOLUTION IRRIGATION AS NEEDED
Status: DISCONTINUED | OUTPATIENT
Start: 2025-08-04 | End: 2025-08-04 | Stop reason: HOSPADM

## 2025-08-04 RX ORDER — FENTANYL CITRATE 50 UG/ML
INJECTION, SOLUTION INTRAMUSCULAR; INTRAVENOUS AS NEEDED
Status: DISCONTINUED | OUTPATIENT
Start: 2025-08-04 | End: 2025-08-04

## 2025-08-04 RX ADMIN — LIDOCAINE HYDROCHLORIDE 2 ML: 10 INJECTION, SOLUTION EPIDURAL; INFILTRATION; INTRACAUDAL; PERINEURAL at 10:50

## 2025-08-04 RX ADMIN — CEFAZOLIN SODIUM 2 G: 2 INJECTION, SOLUTION INTRAVENOUS at 23:06

## 2025-08-04 RX ADMIN — OXYCODONE HYDROCHLORIDE AND ACETAMINOPHEN 500 MG: 500 TABLET ORAL at 21:58

## 2025-08-04 RX ADMIN — MIDAZOLAM 1 MG: 1 INJECTION INTRAMUSCULAR; INTRAVENOUS at 12:23

## 2025-08-04 RX ADMIN — HYDROMORPHONE HYDROCHLORIDE 0.5 MG: 2 INJECTION, SOLUTION INTRAMUSCULAR; INTRAVENOUS; SUBCUTANEOUS at 15:13

## 2025-08-04 RX ADMIN — MIDAZOLAM 2 MG: 1 INJECTION INTRAMUSCULAR; INTRAVENOUS at 10:50

## 2025-08-04 RX ADMIN — ZIPRASIDONE HCL 40 MG: 20 CAPSULE ORAL at 23:06

## 2025-08-04 RX ADMIN — Medication: at 15:26

## 2025-08-04 RX ADMIN — BUPIVACAINE HYDROCHLORIDE AND EPINEPHRINE BITARTRATE 20 ML: 5; .0091 INJECTION, SOLUTION PERINEURAL at 10:50

## 2025-08-04 RX ADMIN — TRANEXAMIC ACID 1000 MG: 10 INJECTION, SOLUTION INTRAVENOUS at 12:28

## 2025-08-04 RX ADMIN — EPHEDRINE SULFATE 12.5 MG: 50 INJECTION, SOLUTION INTRAVENOUS at 13:35

## 2025-08-04 RX ADMIN — ONDANSETRON 4 MG: 2 INJECTION, SOLUTION INTRAMUSCULAR; INTRAVENOUS at 14:19

## 2025-08-04 RX ADMIN — LAMOTRIGINE 100 MG: 100 TABLET ORAL at 21:58

## 2025-08-04 RX ADMIN — DEXAMETHASONE SODIUM PHOSPHATE 5 MG: 4 INJECTION INTRA-ARTICULAR; INTRALESIONAL; INTRAMUSCULAR; INTRAVENOUS; SOFT TISSUE at 10:50

## 2025-08-04 RX ADMIN — HYDROMORPHONE HYDROCHLORIDE 0.5 MG: 2 INJECTION, SOLUTION INTRAMUSCULAR; INTRAVENOUS; SUBCUTANEOUS at 15:00

## 2025-08-04 RX ADMIN — FENTANYL CITRATE 50 MCG: 50 INJECTION, SOLUTION INTRAMUSCULAR; INTRAVENOUS at 13:37

## 2025-08-04 RX ADMIN — FENTANYL CITRATE 50 MCG: 50 INJECTION, SOLUTION INTRAMUSCULAR; INTRAVENOUS at 12:24

## 2025-08-04 RX ADMIN — DESMOPRESSIN ACETATE 15.2 MCG: 4 INJECTION, SOLUTION INTRAVENOUS; SUBCUTANEOUS at 11:24

## 2025-08-04 RX ADMIN — VECURONIUM BROMIDE 6 MG: 1 INJECTION, POWDER, LYOPHILIZED, FOR SOLUTION INTRAVENOUS at 12:20

## 2025-08-04 RX ADMIN — SODIUM CHLORIDE, SODIUM LACTATE, POTASSIUM CHLORIDE, AND CALCIUM CHLORIDE 100 ML/HR: .6; .31; .03; .02 INJECTION, SOLUTION INTRAVENOUS at 09:47

## 2025-08-04 RX ADMIN — DOCUSATE SODIUM 100 MG: 100 CAPSULE, LIQUID FILLED ORAL at 21:58

## 2025-08-04 RX ADMIN — EPHEDRINE SULFATE 12.5 MG: 50 INJECTION, SOLUTION INTRAVENOUS at 13:56

## 2025-08-04 RX ADMIN — TRANEXAMIC ACID 1000 MG: 10 INJECTION, SOLUTION INTRAVENOUS at 14:53

## 2025-08-04 RX ADMIN — VECURONIUM BROMIDE 4 MG: 1 INJECTION, POWDER, LYOPHILIZED, FOR SOLUTION INTRAVENOUS at 13:37

## 2025-08-04 RX ADMIN — SODIUM CHLORIDE, SODIUM LACTATE, POTASSIUM CHLORIDE, AND CALCIUM CHLORIDE 100 ML/HR: .6; .31; .03; .02 INJECTION, SOLUTION INTRAVENOUS at 22:00

## 2025-08-04 RX ADMIN — CEFAZOLIN SODIUM 2 G: 2 INJECTION, SOLUTION INTRAVENOUS at 17:07

## 2025-08-04 RX ADMIN — OXYBUTYNIN CHLORIDE 5 MG: 5 TABLET ORAL at 21:58

## 2025-08-04 RX ADMIN — LIOTHYRONINE SODIUM 5 MCG: 5 TABLET ORAL at 17:28

## 2025-08-04 RX ADMIN — ACETAMINOPHEN 650 MG: 325 TABLET ORAL at 23:10

## 2025-08-04 RX ADMIN — Medication 20 ML: at 10:50

## 2025-08-04 RX ADMIN — VECURONIUM BROMIDE 2 MG: 1 INJECTION, POWDER, LYOPHILIZED, FOR SOLUTION INTRAVENOUS at 14:19

## 2025-08-04 RX ADMIN — POVIDONE-IODINE 1 APPLICATION: 5 SOLUTION TOPICAL at 09:46

## 2025-08-04 RX ADMIN — LIDOCAINE HYDROCHLORIDE 50 MG: 10 INJECTION, SOLUTION EPIDURAL; INFILTRATION; INTRACAUDAL; PERINEURAL at 12:20

## 2025-08-04 RX ADMIN — CEFAZOLIN 2 G: 1 INJECTION, POWDER, FOR SOLUTION INTRAMUSCULAR; INTRAVENOUS at 12:22

## 2025-08-04 RX ADMIN — VECURONIUM BROMIDE 2 MG: 1 INJECTION, POWDER, LYOPHILIZED, FOR SOLUTION INTRAVENOUS at 12:53

## 2025-08-04 RX ADMIN — Medication: at 17:09

## 2025-08-04 RX ADMIN — SUGAMMADEX 120 MG: 100 INJECTION, SOLUTION INTRAVENOUS at 15:06

## 2025-08-04 RX ADMIN — PROPOFOL 100 MG: 10 INJECTION, EMULSION INTRAVENOUS at 12:20

## 2025-08-04 RX ADMIN — LIDOCAINE HYDROCHLORIDE 3 ML: 40 INJECTION, SOLUTION RETROBULBAR; TOPICAL at 12:25

## 2025-08-04 SDOH — SOCIAL STABILITY: SOCIAL INSECURITY: WITHIN THE LAST YEAR, HAVE YOU BEEN HUMILIATED OR EMOTIONALLY ABUSED IN OTHER WAYS BY YOUR PARTNER OR EX-PARTNER?: NO

## 2025-08-04 SDOH — ECONOMIC STABILITY: HOUSING INSECURITY: AT ANY TIME IN THE PAST 12 MONTHS, WERE YOU HOMELESS OR LIVING IN A SHELTER (INCLUDING NOW)?: NO

## 2025-08-04 SDOH — ECONOMIC STABILITY: FOOD INSECURITY: HOW HARD IS IT FOR YOU TO PAY FOR THE VERY BASICS LIKE FOOD, HOUSING, MEDICAL CARE, AND HEATING?: NOT HARD AT ALL

## 2025-08-04 SDOH — ECONOMIC STABILITY: INCOME INSECURITY: IN THE PAST 12 MONTHS HAS THE ELECTRIC, GAS, OIL, OR WATER COMPANY THREATENED TO SHUT OFF SERVICES IN YOUR HOME?: NO

## 2025-08-04 SDOH — SOCIAL STABILITY: SOCIAL INSECURITY: DO YOU FEEL UNSAFE GOING BACK TO THE PLACE WHERE YOU ARE LIVING?: NO

## 2025-08-04 SDOH — HEALTH STABILITY: MENTAL HEALTH: HOW MANY DRINKS CONTAINING ALCOHOL DO YOU HAVE ON A TYPICAL DAY WHEN YOU ARE DRINKING?: 1 OR 2

## 2025-08-04 SDOH — ECONOMIC STABILITY: HOUSING INSECURITY: DO YOU FEEL UNSAFE GOING BACK TO THE PLACE WHERE YOU LIVE?: NO

## 2025-08-04 SDOH — ECONOMIC STABILITY: HOUSING INSECURITY: IN THE PAST 12 MONTHS, HOW MANY TIMES HAVE YOU MOVED WHERE YOU WERE LIVING?: 0

## 2025-08-04 SDOH — SOCIAL STABILITY: SOCIAL INSECURITY: HAVE YOU HAD ANY THOUGHTS OF HARMING ANYONE ELSE?: NO

## 2025-08-04 SDOH — HEALTH STABILITY: MENTAL HEALTH: HOW OFTEN DO YOU HAVE SIX OR MORE DRINKS ON ONE OCCASION?: NEVER

## 2025-08-04 SDOH — ECONOMIC STABILITY: FOOD INSECURITY: WITHIN THE PAST 12 MONTHS, THE FOOD YOU BOUGHT JUST DIDN'T LAST AND YOU DIDN'T HAVE MONEY TO GET MORE.: NEVER TRUE

## 2025-08-04 SDOH — SOCIAL STABILITY: SOCIAL INSECURITY: ARE YOU OR HAVE YOU BEEN THREATENED OR ABUSED PHYSICALLY, EMOTIONALLY, OR SEXUALLY BY ANYONE?: NO

## 2025-08-04 SDOH — SOCIAL STABILITY: SOCIAL INSECURITY: HAS ANYONE EVER THREATENED TO HURT YOUR FAMILY OR YOUR PETS?: NO

## 2025-08-04 SDOH — HEALTH STABILITY: MENTAL HEALTH: HOW OFTEN DO YOU HAVE A DRINK CONTAINING ALCOHOL?: 2-4 TIMES A MONTH

## 2025-08-04 SDOH — ECONOMIC STABILITY: HOUSING INSECURITY: IN THE LAST 12 MONTHS, WAS THERE A TIME WHEN YOU WERE NOT ABLE TO PAY THE MORTGAGE OR RENT ON TIME?: NO

## 2025-08-04 SDOH — ECONOMIC STABILITY: FOOD INSECURITY: WITHIN THE PAST 12 MONTHS, YOU WORRIED THAT YOUR FOOD WOULD RUN OUT BEFORE YOU GOT THE MONEY TO BUY MORE.: NEVER TRUE

## 2025-08-04 SDOH — SOCIAL STABILITY: SOCIAL INSECURITY: WITHIN THE LAST YEAR, HAVE YOU BEEN AFRAID OF YOUR PARTNER OR EX-PARTNER?: NO

## 2025-08-04 SDOH — SOCIAL STABILITY: SOCIAL INSECURITY: ARE THERE ANY APPARENT SIGNS OF INJURIES/BEHAVIORS THAT COULD BE RELATED TO ABUSE/NEGLECT?: NO

## 2025-08-04 SDOH — SOCIAL STABILITY: SOCIAL INSECURITY: WERE YOU ABLE TO COMPLETE ALL THE BEHAVIORAL HEALTH SCREENINGS?: YES

## 2025-08-04 SDOH — SOCIAL STABILITY: SOCIAL INSECURITY: HAVE YOU HAD THOUGHTS OF HARMING ANYONE ELSE?: NO

## 2025-08-04 SDOH — SOCIAL STABILITY: SOCIAL INSECURITY: ABUSE: ADULT

## 2025-08-04 SDOH — HEALTH STABILITY: MENTAL HEALTH: CURRENT SMOKER: 0

## 2025-08-04 SDOH — SOCIAL STABILITY: SOCIAL INSECURITY: DOES ANYONE TRY TO KEEP YOU FROM HAVING/CONTACTING OTHER FRIENDS OR DOING THINGS OUTSIDE YOUR HOME?: NO

## 2025-08-04 SDOH — SOCIAL STABILITY: SOCIAL INSECURITY: DO YOU FEEL ANYONE HAS EXPLOITED OR TAKEN ADVANTAGE OF YOU FINANCIALLY OR OF YOUR PERSONAL PROPERTY?: NO

## 2025-08-04 SDOH — ECONOMIC STABILITY: TRANSPORTATION INSECURITY: IN THE PAST 12 MONTHS, HAS LACK OF TRANSPORTATION KEPT YOU FROM MEDICAL APPOINTMENTS OR FROM GETTING MEDICATIONS?: NO

## 2025-08-04 ASSESSMENT — LIFESTYLE VARIABLES
HOW OFTEN DO YOU HAVE 6 OR MORE DRINKS ON ONE OCCASION: NEVER
HOW MANY STANDARD DRINKS CONTAINING ALCOHOL DO YOU HAVE ON A TYPICAL DAY: 1 OR 2
AUDIT-C TOTAL SCORE: 2
SKIP TO QUESTIONS 9-10: 1
SKIP TO QUESTIONS 9-10: 1
AUDIT-C TOTAL SCORE: 2
HOW OFTEN DO YOU HAVE A DRINK CONTAINING ALCOHOL: 2-4 TIMES A MONTH
AUDIT-C TOTAL SCORE: 2

## 2025-08-04 ASSESSMENT — ACTIVITIES OF DAILY LIVING (ADL)
PATIENT'S MEMORY ADEQUATE TO SAFELY COMPLETE DAILY ACTIVITIES?: YES
ASSISTIVE_DEVICE: EYEGLASSES
BATHING: INDEPENDENT
LACK_OF_TRANSPORTATION: NO
FEEDING YOURSELF: INDEPENDENT
JUDGMENT_ADEQUATE_SAFELY_COMPLETE_DAILY_ACTIVITIES: YES
TOILETING: INDEPENDENT
HEARING - RIGHT EAR: FUNCTIONAL
DRESSING YOURSELF: INDEPENDENT
ADEQUATE_TO_COMPLETE_ADL: YES
GROOMING: INDEPENDENT
WALKS IN HOME: INDEPENDENT
HEARING - LEFT EAR: FUNCTIONAL

## 2025-08-04 ASSESSMENT — COGNITIVE AND FUNCTIONAL STATUS - GENERAL
PATIENT BASELINE BEDBOUND: NO
MOVING TO AND FROM BED TO CHAIR: A LITTLE
CLIMB 3 TO 5 STEPS WITH RAILING: A LITTLE
CLIMB 3 TO 5 STEPS WITH RAILING: A LITTLE
MOBILITY SCORE: 20
DAILY ACTIVITIY SCORE: 23
MOBILITY SCORE: 23
DRESSING REGULAR LOWER BODY CLOTHING: A LITTLE
WALKING IN HOSPITAL ROOM: A LITTLE
DAILY ACTIVITIY SCORE: 23
DRESSING REGULAR LOWER BODY CLOTHING: A LITTLE
STANDING UP FROM CHAIR USING ARMS: A LITTLE

## 2025-08-04 ASSESSMENT — PAIN SCALES - GENERAL
PAINLEVEL_OUTOF10: 0 - NO PAIN

## 2025-08-04 ASSESSMENT — PAIN - FUNCTIONAL ASSESSMENT
PAIN_FUNCTIONAL_ASSESSMENT: 0-10

## 2025-08-04 ASSESSMENT — PATIENT HEALTH QUESTIONNAIRE - PHQ9
SUM OF ALL RESPONSES TO PHQ9 QUESTIONS 1 & 2: 0
1. LITTLE INTEREST OR PLEASURE IN DOING THINGS: NOT AT ALL
2. FEELING DOWN, DEPRESSED OR HOPELESS: NOT AT ALL

## 2025-08-04 NOTE — ANESTHESIA PREPROCEDURE EVALUATION
Patient: Sinena Acuña    Procedure Information       Date/Time: 08/04/25 1105    Procedures:       ARTHROPLASTY, KNEE, PARTIAL (Left: Knee)      OSTEOTOMY, TIBIAL TUBEROSITY (Left: Knee)      RECONSTRUCTION, PATELLA (Left: Knee)      ARTHROSCOPY, KNEE, WITH OPEN REPAIR (Left: Knee)    Location: [facility] / [facility]    Surgeons: Shai Green DO          Vitals:    08/04/25 0930   BP: 110/65   Pulse: 77   Resp: 16   Temp: 36.5 °C (97.7 °F)   SpO2: 98%       Surgical History[1]  Medical History[2]  Current Medications[3]  Prior to Admission medications   Medication Sig Start Date End Date Taking? Authorizing Provider   buPROPion XL (Wellbutrin XL) 300 mg 24 hr tablet Take 1 tablet (300 mg) by mouth once daily in the morning. 10/7/23  Yes Historical Provider, MD   Ibsrela 50 mg tablet tablet Take 1 tablet (50 mg) by mouth 2 times a day. 12/20/24  Yes Historical Provider, MD   lamoTRIgine (LaMICtal) 100 mg tablet Take 1,258 mg by mouth 2 times a day. 5/4/25  Yes Historical Provider, MD   levothyroxine (Synthroid, Levoxyl) 100 mcg tablet Take 1 tablet (100 mcg) by mouth early in the morning.. Take on an empty stomach at the same time each day, either 30 to 60 minutes prior to breakfast 1/24/25 1/24/26 Yes Carrol Yepez DO   liothyronine (Cytomel) 5 mcg tablet Take 1 tablet (5 mcg) by mouth 2 times a day.   Yes Historical Provider, MD   Myrbetriq 50 mg tablet extended release 24 hr 24 hr tablet Take 1 tablet (50 mg) by mouth once daily. 7/26/25 7/26/26 Yes STEFFI Mcdaniels-CNM, ND   ondansetron (Zofran) 4 mg tablet Take 1 tablet (4 mg) by mouth every 8 hours if needed for nausea or vomiting. 10/3/23  Yes Historical Provider, MD   potassium chloride CR (Klor-Con) 10 mEq ER tablet Take 1 tablet (10 mEq) by mouth once daily. Do not crush, chew, or split. 5/28/25 5/28/26 Yes Carrol Yepez DO   vilazodone (Viibryd) 40 mg tablet Take 1 tablet (40 mg) by mouth once daily with breakfast. 12/30/24  Yes  Historical Provider, MD   cholecalciferol (Vitamin D-3) 50,000 unit capsule Take 1 capsule (50,000 Units) by mouth every 30 (thirty) days. 9/19/23   Historical Provider, MD   diclofenac epolamine 1.3 % patch APPLY 1  TOPICALLY TO SKIN ONCE DAILY  Patient not taking: Reported on 8/4/2025 1/7/25   Loretta Olson MD   estradiol (Estrace) 0.01 % (0.1 mg/gram) vaginal cream Apply pea-sized amount of cream to affected area twice a week.  Patient not taking: Reported on 8/4/2025 7/15/24   STEFFI Mcdaniels-CNM, ND   onabotulinumtoxinA (Botox) 100 unit injection Provider to inject a total of 300 units into the muscles every 90 days for migraine prevention and dystonia. For office use only. 7/17/25   Loretta Olson MD   Ozempic 0.25 mg or 0.5 mg (2 mg/3 mL) pen injector  3/11/25   Historical Provider, MD   Ubrelvy 100 mg tablet tablet Take 1 tablet (100 mg) by mouth. 1/30/25 1/30/26  Historical Provider, MD   ziprasidone (Geodon) 20 mg capsule Take 1 capsule (20 mg) by mouth 2 times daily (morning and late afternoon). 10/30/24   Historical Provider, MD     RX Allergies[4]  Social History     Tobacco Use    Smoking status: Never    Smokeless tobacco: Never   Substance Use Topics    Alcohol use: Yes     Alcohol/week: 2.0 standard drinks of alcohol     Types: 2 Glasses of wine per week     Comment: weekends         Chemistry    Lab Results   Component Value Date/Time     05/22/2025 1638    K 3.9 05/22/2025 1638     05/22/2025 1638    CO2 29 05/22/2025 1638    BUN 19 05/22/2025 1638    CREATININE 1.13 (H) 05/22/2025 1638    Lab Results   Component Value Date/Time    CALCIUM 9.0 05/22/2025 1638    ALKPHOS 56 05/22/2025 1638    AST 26 05/22/2025 1638    ALT 19 05/22/2025 1638    BILITOT 0.3 05/22/2025 1638          Lab Results   Component Value Date/Time    WBC 6.2 05/22/2025 1638    HGB 13.4 05/22/2025 1638    HCT 40.3 05/22/2025 1638     05/22/2025 1638     Lab Results   Component Value Date/Time    PROTIME  10.6 2025 0933    INR 0.9 2025 0933     No results found for this or any previous visit (from the past 4464 hours).  No results found for this or any previous visit from the past 1095 days.    Relevant Problems   Cardiac   (+) Intercostal neuralgia      Neuro   (+) Cervical radiculopathy due to degenerative joint disease of spine   (+) History of migraine headaches   (+) Mild episode of recurrent major depressive disorder   (+) Piriformis syndrome      GI   (+) Erosive gastritis      Endocrine   (+) Hypothyroidism   (+) Solitary thyroid nodule      Hematology   (+) Anemia   (+) Von Willebrand disease (Multi)      Musculoskeletal   (+) Osteoarthritis of left knee, unspecified osteoarthritis type      GYN   (+) Menometrorrhagia       Clinical information reviewed:    Allergies  Meds     OB Status           NPO Detail:  NPO/Void Status  Time of Last Liquid: 0800  Date of Last Solid: 25  Last Intake Type: Clear fluids  Time of Last Void: 0930         Physical Exam    Airway  Mallampati: II     Cardiovascular - normal exam   Dental    Pulmonary - normal exam   Abdominal            Anesthesia Plan    History of general anesthesia?: yes  History of complications of general anesthesia?: no    ASA 2     general and regional     The patient is not a current smoker.  Patient was not previously instructed to abstain from smoking on day of procedure.  Patient did not smoke on day of procedure.  Education provided regarding risk of obstructive sleep apnea.  intravenous induction   Anesthetic plan and risks discussed with patient.  Use of blood products discussed with patient who.    Plan discussed with CRNA.    DDAVP prior to surgery         [1]   Past Surgical History:  Procedure Laterality Date    BREAST SURGERY Bilateral     AUGMENTATION     SECTION, CLASSIC  2013     Section     SECTION, LOW TRANSVERSE  10--    CHOLECYSTECTOMY      COLON SURGERY      COLONOSCOPY      CT  ABDOMEN PELVIS ANGIOGRAM W AND/OR WO IV CONTRAST  05/28/2014    CT ABDOMEN PELVIS ANGIOGRAM W AND/OR WO IV CONTRAST 5/28/2014 [facility]    ESOPHAGOGASTRODUODENOSCOPY  01/2022    HYSTERECTOMY  04/10/2013    Hysterectomy    KNEE ARTHROPLASTY  09/2024    KNEE ARTHROSCOPY W/ DEBRIDEMENT Left 10/30/2024    Left knee scope AJD, debridement of bakers cyst via posteromedial portal    KNEE ARTHROSCOPY, MEDIAL PATELLO FEMORAL LIGAMENT REPAIR Left     LASIK      WI BREAST AUGMENTATION WITH IMPLANT      SIGMOIDECTOMY      2019    SIGMOIDECTOMY      TUBAL LIGATION  04/03/2013    Tubal Ligation   [2]   Past Medical History:  Diagnosis Date    Abnormal CT of thoracic spine 03/31/2017    Abnormal platelet function test (Multi) 01/28/2014    Anemia     Past    Anxiety     Baker's cyst     Baker's cyst of knee, left     Blood in stool 04/16/2015    Colonoscopy & EGD tomorrow  Will speak with Q3 regarding procedures tomorrow   Bowel prep tonight      Bright red blood per rectum 08/22/2017    Chilblain lupus     Chronic kidney disease 01/01/2025    CKD (chronic kidney disease)     Clotting disorder (Multi)     Depression     Disease of thyroid gland     Hypothyroidism    Gastritis 01/2022    Hematuria 03/08/2016    History of blood transfusion     Hypothyroidism 2005    IBS (irritable bowel syndrome)     Infection due to fungus 11/14/2024    Inflammatory bowel disease     Irritation of vulva 11/14/2024    Left thigh pain 11/20/2014    Migraines     Motion sickness     Sea sickness kotion sickness    Muscle strain of gluteal region 12/05/2014    OAB (overactive bladder)     Osteopenia     Overactive bladder     Platelet disorder (Multi) 2002    Platelet dysfunction    PONV (postoperative nausea and vomiting)     Positive JENNIFER (antinuclear antibody)     Pyelonephritis 11/14/2024 5/2015      Swelling of extremity 11/14/2024    Von Willebrand disease (Multi)    [3]   Current Facility-Administered Medications:     desmopressin  (DDAVP) 15.2 mcg in sodium chloride 0.9% 50 mL IV, 0.3 mcg/kg, intravenous, Once, Lianne English, APRN-CNP    lactated Ringer's infusion, 100 mL/hr, intravenous, Continuous, Alan Freeman MD, Last Rate: 100 mL/hr at 08/04/25 0947, 100 mL/hr at 08/04/25 0947  [4]   Allergies  Allergen Reactions    Alendronate Unknown     Possible allergy   Inc bleeding with this medication

## 2025-08-04 NOTE — ANESTHESIA POSTPROCEDURE EVALUATION
Patient: Sienna Acuña    Procedure Summary       Date: 08/04/25 Room / Location: [facility] / [facility]    Anesthesia Start: 1212 Anesthesia Stop: 1525    Procedures:       ARTHROPLASTY, KNEE, PARTIAL (Left: Knee)      OSTEOTOMY, TIBIAL TUBEROSITY (Left: Knee)      RECONSTRUCTION, PATELLA (Left: Knee)      KNEE, WITH OPEN REPAIR (Left: Knee) Diagnosis:       Primary osteoarthritis of left knee      Patellofemoral disorder of left knee      (Primary osteoarthritis of left knee [M17.12])      (Patellofemoral disorder of left knee [M22.2X2])    Surgeons: Shai Green DO Responsible Provider: Dougie Mina MD    Anesthesia Type: general, regional ASA Status: 2            Anesthesia Type: general, regional    Vitals Value Taken Time   /76 08/04/25 16:15   Temp 36.5 °C (97.7 °F) 08/04/25 15:26   Pulse 70 08/04/25 16:15   Resp 15 08/04/25 16:15   SpO2 97 % 08/04/25 16:15       Anesthesia Post Evaluation    Patient location during evaluation: PACU  Patient participation: complete - patient participated  Level of consciousness: awake  Pain management: adequate  Multimodal analgesia pain management approach  Airway patency: patent  Two or more strategies used to mitigate risk of obstructive sleep apnea  Cardiovascular status: acceptable  Respiratory status: acceptable  Hydration status: acceptable  Postoperative Nausea and Vomiting: none        No notable events documented.

## 2025-08-04 NOTE — CARE PLAN
The patient's goals for the shift include      The clinical goals for the shift include pain control and ambulation    Problem: Safety - Adult  Goal: Free from fall injury  Outcome: Progressing     Problem: Pain - Adult  Goal: Verbalizes/displays adequate comfort level or baseline comfort level  Outcome: Progressing     Problem: Discharge Planning  Goal: Discharge to home or other facility with appropriate resources  Outcome: Progressing

## 2025-08-04 NOTE — BRIEF OP NOTE
Date: 2025  OR Location: [facility] OR    Name: Sienna Acuña, : 1975, Age: 50 y.o., MRN: [id number], Sex: female    Diagnosis  Pre-op Diagnosis      * Primary osteoarthritis of left knee [M17.12]     * Patellofemoral disorder of left knee [M22.2X2] Post-op Diagnosis     * Primary osteoarthritis of left knee [M17.12]     * Patellofemoral disorder of left knee [M22.2X2]     Procedures  ARTHROPLASTY, KNEE, PARTIAL  67753 - [place] ARTHROPLASTY PATELLA W/PROSTHESIS    OSTEOTOMY, TIBIAL TUBEROSITY  65714 - SC ANTERIOR TIBIAL TUBERCLEPLASTY    RECONSTRUCTION, PATELLA  15272 - [place] RCNSTJ DISLOCATING PATELLA    KNEE, WITH OPEN REPAIR  28373 - [place] LATERAL RETINACULAR RELEASE OPEN      Surgeons      * Shai Green - Primary    Resident/Fellow/Other Assistant:  Facundo Moore SA    Staff:   Circulator: Vika  Scrub Person: Jeni  Surgical Assistant: Taqueria  Circulator: Rito  Surgical Assistant: Fernando  RNFA: Alan    Anesthesia Staff: Anesthesiologist: Dougie Mina MD  CRNA: STEFFI Flores-CRNA    Procedure Summary  Anesthesia: Regional Blocks, ET General  ASA: II  Estimated Blood Loss: 5mL  Intra-op Medications:   Administrations occurring from 1105 to 1435 on 25:   Medication Name Total Dose   BUPivacaine HCl (Marcaine) 0.5 % (5 mg/mL) 5 mL, methylPREDNISolone acetate (DEPO-Medrol) 80 mg syringe 7 mL   vancomycin (Vancocin) vial for injection 1 g   ceFAZolin (Ancef) vial 1 g 2 g   ePHEDrine injection 25 mg   fentaNYL (Sublimaze) injection 50 mcg/mL 100 mcg   lidocaine PF (Xylocaine-MPF) local injection 4 % 3 mL   midazolam (Versed) injection 1 mg/mL 1 mg   ondansetron (Zofran) 2 mg/mL injection 4 mg   propofol (Diprivan) injection 10 mg/mL 100 mg   tranexamic acid IV 1,000 mg in 100 mL NaCl (iso) - premix 1,000 mg   vecuronium (Norcuron) injection 10 mg 14 mg   desmopressin (DDAVP) 15.2 mcg in sodium chloride 0.9% 50 mL IV Cannot be calculated   dexAMETHasone (Decadron) 4 mg/mL IV Syringe 2 mL 4 mg    lidocaine PF (Xylocaine-MPF) local injection 1 % 5 mL              Anesthesia Record               Intraprocedure I/O Totals          Intake    Tranexamic Acid 0.00 mL    The total shown is the total volume documented since Anesthesia Start was filed.    Total Intake 0 mL       Output    Est. Blood Loss 5 mL    Total Output 5 mL       Net    Net Volume -5 mL          Specimen: No specimens collected               Findings: see dictation     Complications:  None; patient tolerated the procedure well.     Disposition: PACU - hemodynamically stable.  Condition: stable  Specimens Collected: No specimens collected  Attending Attestation: I performed the procedure.    Shai Green  Phone Number: [phone]

## 2025-08-04 NOTE — OP NOTE
ARTHROPLASTY, KNEE, PARTIAL (L), OSTEOTOMY, TIBIAL TUBEROSITY (L), RECONSTRUCTION, PATELLA (L), KNEE, WITH OPEN REPAIR (L) Operative Note     Date: 2025  OR Location: [facility]    Name: Sienna Acuña, : 1975, Age: 50 y.o., MRN: [id number], Sex: female    Diagnosis  Pre-op Diagnosis      * Primary osteoarthritis of left knee [M17.12]     * Patellofemoral disorder of left knee [M22.2X2]  Right knee pain Post-op Diagnosis     * Primary osteoarthritis of left knee [M17.12]     * Patellofemoral disorder of left knee [M22.2X2]  Right knee pain     Procedures  ARTHROPLASTY, KNEE, PARTIAL  49005 - [place] ARTHROPLASTY PATELLA W/PROSTHESIS    OSTEOTOMY, TIBIAL TUBEROSITY  37580 - [place] ANTERIOR TIBIAL TUBERCLEPLASTY    RECONSTRUCTION, PATELLA  83005 - [place] RCNSTJ DISLOCATING PATELLA    KNEE, WITH OPEN REPAIR  78555 - [place] LATERAL RETINACULAR RELEASE OPEN    [place] ARTHROPLASTY FEM CONDYLES/TIBIAL PLATEAU KNEE [16319]  Surgeons      * Shia Green - Primary    Resident/Fellow/Other Assistant:  Facundo Moore SA    Staff:   Circulator: Vika  Scrub Person: Jeni  Surgical Assistant: Taqueria  Circulator: Rito  Surgical Assistant: Fernando  RNFA: Alan    Anesthesia Staff: Anesthesiologist: Dougie Mina MD  CRNA: STEFFI Flores-CRNA    Procedure Summary  Anesthesia: Regional, General Tracheal intubation  ASA: II  Estimated Blood Loss: 5 mL  Intra-op Medications:   Administrations occurring from 1105 to 1435 on 25:   Medication Name Total Dose   BUPivacaine HCl (Marcaine) 0.5 % (5 mg/mL) 5 mL, methylPREDNISolone acetate (DEPO-Medrol) 80 mg syringe 7 mL   vancomycin (Vancocin) vial for injection 1 g   desmopressin (DDAVP) 15.2 mcg in sodium chloride 0.9% 50 mL IV Cannot be calculated   dexAMETHasone (Decadron) 4 mg/mL IV Syringe 2 mL 4 mg   lidocaine PF (Xylocaine-MPF) local injection 1 % 5 mL   ceFAZolin (Ancef) vial 1 g 2 g   ePHEDrine injection 25 mg   fentaNYL (Sublimaze) injection 50 mcg/mL 100 mcg   lidocaine PF  (Xylocaine-MPF) local injection 4 % 3 mL   midazolam (Versed) injection 1 mg/mL 1 mg   ondansetron (Zofran) 2 mg/mL injection 4 mg   propofol (Diprivan) injection 10 mg/mL 100 mg   tranexamic acid IV 1,000 mg in 100 mL NaCl (iso) - premix 1,000 mg   vecuronium (Norcuron) injection 10 mg 14 mg              Anesthesia Record               Intraprocedure I/O Totals          Intake    Tranexamic Acid 0.00 mL    The total shown is the total volume documented since Anesthesia Start was filed.    Total Intake 0 mL       Output    Est. Blood Loss 5 mL    Total Output 5 mL       Net    Net Volume -5 mL          Specimen: No specimens collected              Drains and/or Catheters: * None in log *    Tourniquet Times:     Total Tourniquet Time Documented:  Thigh (Left) - 115 minutes  Total: Thigh (Left) - 115 minutes      Implants:  Implants       Type Name Action Serial No.      Joint CEMENT, BONE, SIMPLEX HV FULL DOSE W/GENTAMYCIN 40 GM - [id number] Implanted      Joint Knee KIT, T3 AMZ DISPOSABLES - [id number] Implanted       11MM TAPER POST Implanted       FEMORAL-TROCHLEA, X-LARGE +10.0MM X -5.0MM OFFSET Implanted      Joint PATELLA, SYMM TRIATH X3, S27MM X 8MM - [id number] Implanted      Graft GRAFT, CLARIX CORD 4.0 X 3.0 - [id number] - [id number] Implanted [id number]     Screw SCREW, COMPRESSION FT, 5.0 LG, 46MM - [id number] Implanted       SCREW Implanted       SCREW Implanted               Findings: See dictation below    Indications: Sienna Acuña is an 50 y.o. female who is having surgery for Primary osteoarthritis of left knee [M17.12]  Patellofemoral disorder of left knee [M22.2X2].  In the form of a left knee patellofemoral joint arthroplasty with Jazmin type tibial tubercle osteotomy, modified Quan type lateral retinacular lengthening, medial patellofemoral complex repair and plication.  As well as planning for right knee intra-articular corticosteroid injection secondary to right knee pain  that she developed while compensating for her left knee    The patient was seen in the preoperative area. The risks, benefits, complications, treatment options, non-operative alternatives, expected recovery and outcomes were discussed with the patient. The possibilities of reaction to medication, pulmonary aspiration, injury to surrounding structures, bleeding, recurrent infection, the need for additional procedures, failure to diagnose a condition, and creating a complication requiring transfusion or operation were discussed with the patient. The patient concurred with the proposed plan, giving informed consent.  The site of surgery was properly noted/marked if necessary per policy. The patient has been actively warmed in preoperative area. Preoperative antibiotics have been ordered and given within 1 hours of incision. Venous thrombosis prophylaxis have been ordered including unilateral sequential compression device    Procedure Details: Date of surgery: 8/4/2025    Location: [facility]    Pre-Operative Diagnosis: Left knee bone-on-bone patellofemoral joint osteoarthritis with severe patellofemoral maltracking and high-grade underlying trochlear dysplasia    Post-Operative Diagnosis: Left knee bone-on-bone patellofemoral joint osteoarthritis with severe patellofemoral maltracking high-grade underlying trochlear dysplasia    Procedure: Left knee patellofemoral joint arthroplasty with 45 degree Jazmin type tibial tubercle osteotomy, modified Quan type lateral retinacular lengthening and medial patellofemoral complex repair and plication    Implants:  ArthroSurface WAVE trochlear implant-size +10 mm x -5 mm offset  ArthroSurface 11 mm taper post  [place] triathlon X3 symmetric patella button-size S27  Arthrex 5.0 mm headless compression screws x 2 (44 mm and 38 mm)    Surgeon: Shai Green DO    First Assistant: Facundo Moore SA    Intraoperative pathology and findings/operative indications:  The  patient is a pleasant 50-year-old female longstanding history of anterior left knee pain.  She has undergone extensive treatments in the past previous history of arthroscopic debridement at outside facilities bracing physical therapy various forms of injections.  Pain progressed to the point the great interfered with her quality of life she had to stop activities that she would otherwise perform such as jogging riding a bike or even just going up and down stairs.  Pain progressed to the point where it made difficult to sleep at night.  Physical exam revealed palpable arthritic effusion palpable Baker's cyst.  Severe patellofemoral maltracking was noted with a jumping J sign obligate superolateral patellar subluxation with active quad extension severe crepitance and pain with patellofemoral grind.  Knee otherwise ligamentously stable with well-preserved range of motion 0 degrees of active extension straight leg raise with no extension lag flexion to approximately 142 degrees.  Preoperative imaging revealed high-grade trochlear dysplasia with associated supratrochlear spur bone-on-bone patellofemoral apposition.  Subsequent MRI revealed high-grade full-thickness cartilage loss about the entirety of the lateral patellar facet and lateral femoral trochlea with reactive subchondral bone marrow edema faint attritional bone loss about the lateral patellar facet was noted excessive lateral patellar tilt noted edema within the retropatellar fat pad.  TT-PCL measurement elevated at 21 mm.  Patellar height well within acceptable limits.  Coronal plane alignment very acceptable.  Medial lateral compartments reasonably unremarkable with intact meniscus.  Treatment options were discussed patient was having significant pain related to her Baker's cyst she was agreeable to staging arthroscopy of her knee.  This was performed by myself several months ago arthroscopy of her knee with debridement and direct visualization of her medial  and lateral compartments was performed Bakers cyst debrided 3 posterior medial arthroscopy portals.  Patient went on to recover quite well from this procedure with resolution of her Baker's cyst symptoms but continued to have severe patellofemoral joint pain.  Definitive treatment options were discussed with the desire to maintain high level of activities including impacting such as running we did discuss patellofemoral joint arthroplasty however with her severe underlying maltracking this would also necessitate distal realignment with tibial tubercle osteotomy and soft tissue balancing to address her underlying lateral tilt and redundantly loose medial patellofemoral complex.  Patient stated her understanding I did explain that this was a fairly complex combined procedure.  With all that said she did wish to move forward with surgical intervention.  Patient was also complaining of right knee pain related to compensation for her left knee.  She did request intra-articular injection of her right knee be performed at the time of her left knee surgery which was felt was reasonable.  At the time of the procedure exam under anesthesia revealed no true arthritic effusion today range of motion 0 degrees of extension flexion 142 degrees obvious J sign appreciated.  Greater than 3 quadrants of lateral translation of the patella appreciated on exam under anesthesia with the knee in mid flexion.  Patient was found to have some scarring in the retropatellar fat pad consistent with her history of previous arthroscopies.  Complete articular cartilage loss of the superior aspect of the femoral trochlea and the entirety of the lateral patellar facet was noted consistent with preoperative imaging and previous arthroscopic exam.  Lateral patellar facet did demonstrate attritional bone loss measuring only 16 mm in thickness medial patellar facet measuring 20 mm in thickness.  Lateral femoral condyle/patellar tendon overlap appreciated  upon open dissection consistent again with her preoperative imaging.  ACL and PCL intact and unremarkable.  Medial and lateral compartments demonstrating well-preserved articular cartilage with well-preserved meniscal tissue.  No additional loose bodies were identified at the time of the procedure.    Procedure in detail:  The patient was correctly identified marked in preoperative holding risk benefits alternatives and reasonable expectations for outcomes have previously been discussed.  Patient did receive DD- as recommended by her hematologist for a unspecified platelet disorder.  This was started and administered over 30 minutes and allowed to sit in the patient for another 30 minutes prior to making incision.  Also in preoperative holding patient received regional nerve blocks by the department of anesthesia.  Patient was then escorted to operative Marlon. 2 at [facility] once the operative suite surgical pause/amount was performed preoperative antibiotics were administered intravenous TXA was administered and general anesthetic with endotracheal ovation was then provided by the department of anesthesia.  Patient positioned supine on a standard operative table bony prominences well-padded.  Right knee then sterilely prepped with alcohol swab utilizing anatomical marks and a palpation technique right knee was then injected utilizing a 22-gauge inch and a half hypodermic needle through a superior lateral approach.  Aspiration confirmed the knee was intra-articular right knee joint then injected with 5 cc of 0.5% Marcaine and 2 cc of 40 mg/cc Depo-Medrol.  Puncture wound was then covered with a Band-Aid.  Right lower extremity then placed in CYNTHIA hose.  Sequential was applied to the right lower extremity.  Exam under anesthesia to the left lower extremity then performed, pathology as noted above.  Nonsterile tourniquet applied left proximal thigh.  Left lower extremity then sterilely prepped and draped  in standard fashion anatomic landmarks were identified marked with a sterile marking pen Esmarch bandage was applied knee was flexed and tourniquet was raised to 250 mmHg.  A 10 blade was then used to make a anterior incision beginning 4 fingerbreadths distal to the palpable tibial tubercle and carried 2 fingerbreadths proximal to the superior pole of the patella.  Careful dissection through subcutaneous tissues utilizing Bovie cautery to achieve hemostasis was performed.  Medial and lateral subcutaneous flaps were then raised deep to the level of Stulberg's fascia.  A 15 blade was then used to elevate the peritenon of the patellar tendon Johnson elevator was then used to open the interval deep to the patellar tendon.  Bovie cautery and Johnson elevator were then used to elevate the anterior compartment musculature off of the tibial tubercle this area was then packed with hemostatic powder and a sponge.  Knee was taken into extension.  Parallel guide pin was then placed anterior to posterior through the tibial tubercle.  45 degree cut block was then attached and provisionally pinned into place.  Retractors appropriately positioned anterior medial to posterior lateral osteotomy cut was then performed with a saw leaving a distal cortical hinge.  Osteotomy was then completed with an osteotome.  At this time the osteotomy site was covered with vancomycin soaked sponge and not yet fixated.  Proximal dissection was then continued detaching superficial iliotibial band from the lateral border of the patella.  At this time subvastus arthrotomy was performed proximal aspect of the subvastus arthrotomy was then packed with hemostatic powder and sponge.  Patella was then subluxed laterally and the knee was flexed.  Excellent exposure of the femoral trochlea was achieved.  Great care taken is not violate the anterior horn of the medial meniscus.  Scarred retropatellar fat pad tissue then sharply excised with Bovie cautery.   Supratrochlear spur was then excised with a rongeur.  At this time the trochlea was sized.  Size +10 x -5 mm offset implant was found to be most appropriate.  Centering guide was utilized and a central pin was then passed into the femoral trochlea.  Opening reamer was then utilized taken to a depth of 5 mm.  +10 mm offset sizing guide was then inserted.  Double reaming was then performed both proximal and distal.  Wound then copiously irrigated with sterile saline.  Rongeur utilized to remove any rigid bony remnants within the milled recipient site for the trochlear implant.  At this time a trial inlay cochlear implant was appropriately positioned was found to have excellent positioning.  This was provisionally pinned in place.  Central pin then inserted followed by central drill followed by central reamer.  Taper post then inserted unfortunately this was found to not have great purchase within the bone in this area as such we did make the decision to augment the fixation with cement.  At this time trial components were removed taper post was removed.  Wound once again copiously irrigated with sterile saline knee taken into extension.  Freehand cut of the patella was performed resecting to a total patellar thickness of 12 mm.  Patella was sized 27 mm patellar implant was found to be most appropriate.  3 lug holes were then created through an appropriate guide.  At this time wound once again copiously irrigated with sterile saline cement was mixed final implants were opened.  Cement was placed into the prepared taper post hole centrally trial was once again applied and the taper post was then inserted through the trial to ensure accuracy of orientation.  Great care was taken to ensure that there was not cement covering the Contreras taper.  Underside of the trochlear implant was planned to be cemented cement placed on the bone along the periphery of the prepared bed for the inlay trochlear implant.  This was then  impacted into place found of excellent fixation on the Contreras taper of the taper post and excellent fixation with the cement mantle.  Excess cement then removed with Adams elevators.  Patella was once again everted patellar implant was then cemented into place excess cement again removed with Adams elevators.  Components were held under tension until the cement was cured.  At this time the knee was taken into 60 degrees of flexion.  Previously made tibial tubercle osteotomy was mobilized 10 mm medial with a slight anterior bias accounting for the 45 degree cut angle this was then provisionally pinned into place with 2 parallel pins from anterolateral to posterior medial and a third out of plane pin from anterior to posterior.  Tight lateral retinacular structure was identified this was then incised with Bovie cautery 1.5 cm posterior to the lateral patellar attachment is corrected to neutral patellar tilt.  At this time the parallel pins through the tibial tubercle were overdrilled depth measurements were obtained and 5.0 mm headless compression screws of appropriate length were then placed and sequentially tensioned proximal screw measuring 44 mm in length distal screw measuring 38 mm in length.  Excellent tibial tubercle fixation had been achieved guidepins were then removed from the tibial tubercle site.  Knee was taken through range of motion.  Patella was now found to track midline.  At this time we balanced the soft tissues around the patella with the knee at 60 degrees of flexion the anterior aspect of the superficial iliotibial band which had been braced off the lateral border of the patella was then repaired to the posterior aspect of the lateral retinaculum which had been released 1.5 cm posterior to the lateral border patellar attachment this completed the modified Quan type lateral retinacular lengthening.  The medial patellofemoral complex layers 1, 2 and 3 which had been detached from the medial  border of the patella and performing the medial subvastus arthrotomy was then advanced to the VMO tendon utilizing #1 Vicryl ligature in a horizontal mattress fashion.  The medial quadriceps tendon femoral ligament portion and the medial patellofemoral ligament portion of the medial patellofemoral complex were then repaired with #1 Vicryl ligature in interrupted figure-of-eight and running locked segmental fashion.  Medial parapatellar arthrotomy was closed around the patella with #1 Vicryl ligature in interrupted figure-of-eight fashion.  At this time a Amniox Clarix 1k Cord 3x4cm adhesion barrier sheet was then placed deep to the patellar tendon secured with 2-0 Vicryl ligature in simple stitch fashion to limit our risk of postoperative adhesion in this area.  The remainder of the medial parapatellar arthrotomy was then repaired with #1 Vicryl ligature and running locked segmental fashion.  The remainder of the lateral 2 layer arthrotomy was repaired with #1 Vicryl ligature and running locked segmental fashion.  The peritenon of the patellar tendon was then repaired over top of the patellar tendon with #1 Vicryl ligature in interrupted simple stitch fashion.  Anterior compartment then repaired with #1 Vicryl ligature in interrupted simple stitch and interrupted whipstitch fashion.  At this time once all soft tissues had been balanced and repaired the knee was again taken through range of motion found to achieve 0 degrees of extension flexion to 142 degrees patella found to track midline.  1 quadrant of lateral translation appreciated in extension no indication of iatrogenic medial instability.  Mid flexion demonstrated excellent stability of the patella within the trochlear groove of the implant.  Complete resolution of jumping J sign complete resolution of the obligate superior lateral patellar subluxation with extension.  At this time the tourniquet was dropped at 115 minutes hemostasis was achieved with Bovie  cautery wound copiously irrigated with sterile saline.  A second gram of intravenous TXA was then administered.  Pericapsular pain injection was then provided.  Deep subcutaneous tissues were then closed with 2-0 Vicryl ligature in buried interrupted fashion skin reapproximated with 3-0 STRATAFIX running subcuticular fashion followed by application of surgical glue.  Once the surgical glue was completely dried self adherent Mepilex silver dressing was applied over top of the wound.  Patient was placed in a leg length Reparel Sleeve followed by postoperative hinged knee brace locked at 0 degrees of extension.  Patient was awoken, extubated, transferred to a hospital bed and returned to PACU in stable condition.  Counts are correct case was clean elective complications were none specimens were none estimated blood loss was 5 cc post tourniquet dropped.  Tourniquet time was 115 minutes.      Evidence of Infection: No   Complications:  None; patient tolerated the procedure well.    Disposition: PACU - hemodynamically stable.  Condition: stable         Task Performed by RNFA or Surgical Assistant:  Limb positioning, retractor placement, visualization, hemostasis, wound closure as well as to facilitate the safety and efficiency of the procedure          Additional Details: Postoperatively the patient will utilize a postoperative hinged knee brace she will be allowed to weight-bear as tolerated locked in extension.  Nonweightbearing range of motion allowed up to 90 degrees within the first 6 weeks postsurgery.  Stage progressive range of motion goal 0 to 30 degrees in the first 2 weeks postsurgery, 0 to 60 degrees by the first 4 weeks postsurgery, 0 to 90 degrees x 6 weeks postsurgery.  Patient will be followed with postoperative x-rays to follow the healing of her osteotomy site at which point we will advance activities and range of motion accordingly.  Patient was kept in the hospital overnight for monitoring given her  unspecified platelet disorder.    Attending Attestation: I performed the procedure.    Shai Green  Phone Number: [phone]

## 2025-08-04 NOTE — ANESTHESIA PROCEDURE NOTES
Airway  Date/Time: 8/4/2025 12:25 PM  Reason: elective    Airway not difficult    Staffing  Performed: CRNA   Authorized by: Dougie Mina MD    Performed by: STEFFI Flores-ANNY  Patient location during procedure: OR    Patient Condition  Indications for airway management: anesthesia and airway protection  Patient position: sniffing  MILS maintained throughout  Planned trial extubation  Sedation level: deep     Final Airway Details  Preoxygenated: no  Final airway type: endotracheal airway  Successful airway: ETT  Cuffed: yes   Successful intubation technique: video laryngoscopy  Adjuncts used in placement: intubating stylet  Endotracheal tube insertion site: oral  Blade: Bea  Blade size: #3  ETT size (mm): 6.5  Cormack-Lehane Classification: grade I - full view of glottis  Placement verified by: chest auscultation   Cuff volume (mL): 6  Measured from: lips  ETT to lips (cm): 20  Number of attempts at approach: 1

## 2025-08-04 NOTE — ANESTHESIA PROCEDURE NOTES
Peripheral Block    Patient location during procedure: pre-op  Reason for block: at surgeon's request and post-op pain management  Staffing  Performed: attending   Authorized by: Dougie Mina MD    Performed by: Dougie Mina MD  Preanesthetic Checklist  Completed: patient identified, IV checked, site marked, risks and benefits discussed, surgical consent, monitors and equipment checked, pre-op evaluation and timeout performed   Timeout performed at:   Peripheral Block  Patient position: right lateral decubitus  Prep: ChloraPrep and site prepped and draped  Patient monitoring: heart rate and continuous pulse ox  Block type: sciatic  Laterality: left  Injection technique: single-shot  Needle  Needle type: short-bevel   Needle gauge: 22 G  Needle length: 8 cm  Needle localization: anatomical landmarks, ultrasound guidance and nerve stimulator  Assessment  Injection assessment: negative aspiration for heme, no paresthesia on injection, incremental injection and local visualized surrounding nerve on ultrasound  Paresthesia pain: none  Heart rate change: no  Slow fractionated injection: yes  Medications Administered  lidocaine PF (Xylocaine) 10 mg/mL (1 %) injection - intravenous   2 mL - 8/4/2025 10:50:00 AM  dexAMETHasone (Decadron) injection - perineural injection   5 mg - 8/4/2025 10:54:00 AM  bupivacaine-EPINEPHrine (MARCAINE w/EPI) 0.5% -[id number] Perineural - perineural injection   20 mL - 8/4/2025 10:50:00 AM           ACP

## 2025-08-04 NOTE — ANESTHESIA PROCEDURE NOTES
Peripheral Block    Patient location during procedure: pre-op  Reason for block: at surgeon's request and post-op pain management  Staffing  Performed: attending   Authorized by: Dougie Mina MD    Performed by: Dougie Mina MD  Preanesthetic Checklist  Completed: patient identified, IV checked, site marked, risks and benefits discussed, surgical consent, monitors and equipment checked, pre-op evaluation and timeout performed   Timeout performed at:   Peripheral Block  Patient position: laying flat  Prep: ChloraPrep and site prepped and draped  Patient monitoring: heart rate and continuous pulse ox  Block type: adductor canal  Laterality: left  Injection technique: single-shot  Needle  Needle type: short-bevel   Needle gauge: 22 G  Needle length: 8 cm  Needle localization: anatomical landmarks, ultrasound guidance and nerve stimulator  Assessment  Injection assessment: negative aspiration for heme, no paresthesia on injection, incremental injection and local visualized surrounding nerve on ultrasound  Paresthesia pain: none  Heart rate change: no  Slow fractionated injection: yes  Medications Administered  lidocaine PF (Xylocaine) 10 mg/mL (1 %) injection - subcutaneous   2 mL - 8/4/2025 10:50:00 AM  dexAMETHasone (Decadron) injection - perineural injection   5 mg - 8/4/2025 10:50:00 AM  bupivacaine-EPINEPHrine (MARCAINE w/EPI) 0.5% -[id number] Perineural - perineural injection   20 mL - 8/4/2025 10:50:00 AM

## 2025-08-04 NOTE — DISCHARGE INSTRUCTIONS
"Post op appt 8/19/25 at 830 am at the [facility]   \"Postoperatively the patient will utilize a postoperative hinged knee brace she will be allowed to weight-bear as tolerated locked in extension. Nonweightbearing range of motion allowed up to 90 degrees within the first 6 weeks postsurgery. Stage progressive range of motion goal 0 to 30 degrees in the first 2 weeks postsurgery, 0 to 60 degrees by the first 4 weeks postsurgery, 0 to 90 degrees x 6 weeks postsurgery. Patient will be followed with postoperative x-rays to follow the healing of her osteotomy site at which point we will advance activities and range of motion accordingly. Patient was kept in the hospital overnight for monitoring given her unspecified platelet disorder.\"   [facility]                            STEPHENIE Clements, RN-BC Orthopedic   for [facility] - Phone: [phone]    Shai Green D.O.  Phone: [phone]    POSTOPERATIVE INSTRUCTIONS: TOTAL HIP & TOTAL KNEE ARTHROPLASTY    General/highlights: Flex/tighten the muscles in the buttocks, thighs and calves often when in chair or bed.  Utilize the incentive spirometer often especially the next 3 days.  Walk at least 10 steps every hour that you are awake.  Take stairs 1 at a time, good leg leads up, bed leg legs down, use the handrails.  You may be weightbearing as tolerated, in general the walker is used for 2 weeks.    PAIN, SWELLING & BRUISING  Some pain, stiffness and swelling is normal for up to 1 year after surgery.  Pain will start to let up over time depending on your activity level.  It is preferable to rest for 24 hours following your surgery  Pain is often delayed for 24-48 hours after surgery.  Pain may be dull/achy, throbbing, or even sharp/nerve sensations  It is normal for swelling and bruising to worsen before it gets better.  These symptoms usually peak 1 week after surgery  Swelling and " bruising may appear throughout the leg, all the way down to your toes  Wear your compression stockings every day during the day for 14 days and remove them at night.  This will help control swelling    WOUND CARE INSTRUCTIONS  Your surgical bandage will be removed 2 weeks after surgery at your post-operative visit.  If your bandage becomes compromised, begins to come off before then, or soaks through with drainage call the office immediately.  You may have sutures under the skin that dissolve on their own over time.    As the sutures absorb occasionally a small suture abscess can develop, this is not uncommon for up to 6 weeks, if this occurs please notify your surgeon immediately.    HYGIENE  You may shower 48 hours after your surgery, provided the Mepilex silver dressing is in place.  No tub bathing or submerging underwater.  Do not scrub directly over the surgical bandage  Do not use any creams, lotions or ointments on the surgical leg for 4 weeks after surgery, or until you have been cleared to do so by your surgeon    GENERAL INSTRUCTIONS  Do not drink alcoholic beverages (beer and wine included) for 24 hours following your surgery, or while you are taking narcotic pain medications  Delay making important decisions until you are fully recovered  You cannot swim or submerge in water for at least 6 weeks after surgery, or until you are cleared by your surgeon.  You may start kneeling 3 months after knee replacement surgery, once you have been cleared by your surgeon.  This may not ever feel “normal” or comfortable    HOME DIET  Resume your normal diet after surgery. If you are on a specific type of diet for your condition, resume that instead.    Choose foods that help promote good bowel habits and prevent constipation, such as foods high in fiber.          POSTOPERATIVE MEDICATIONS    Pain medications have been ordered to help manage pain throughout recovery.  While you are using narcotic pain medication, you  "should be using a stool softener or laxative to prevent constipation.  My preference is parallax powder once or twice a day when taking the narcotic pain medicine  It is important to eat a small meal or snack before taking pain medications to avoid nausea or stomach upset.    MEDICATION REFILLS - [phone]    If you need to request a medication refill, please call the office between 8:30am-4:30pm, Monday through Friday.    Any calls received outside of this timeframe will be handled on the next business day.    Medication requests received on Saturday or Sunday will be handled on Monday.    Please allow 3-5 business days for all medication requests to be processed.    RESTARTING HOME MEDICATIONS  You may restart your home medications the following day after your surgery UNLESS you have been given alternate instructions.    Follow the instructions given to you on your hospital discharge instructions for more information regarding your home medications.    ASSISTIVE DEVICE & MOVEMENT  Initially, you will use a walker or crutches to walk for the first 1-2 weeks.  Once your therapist feels you are ready, you will wean to one crutch or cane followed by no assistive device.  It is important to keep moving throughout recovery.  Walk at least 10 steps every hour that you are awake.  Stairs are part of your recovery, the \"good \"leg leads on the way up, the \"bad \"leg leads on the way down to, use the handrails and not the walker or crutches.  You should be up and walking around several times per day as well as bending your knee and making sure your knee is going completely straight (for knee replacements).  For anterior hip replacements avoid straight leg raise.    NUMBNESS/CLICKING    Decreased sensation or numbness is common on or around the area of the incision due to sensory nerves that are affected at the time of surgery.  The area of numbness will be lateral to the incision  You might always have numbness but the " size of the area of numbness should decrease with time.  It is common for patients to have a “click” in the knee with movement.  This is usually nothing to be concerned about.  There are several reasons why your knee can be making these noises, including the implant components rubbing against each other, or a tendons going over a bony prominence.  Grinding can also occur and is not out of the ordinary.  Grinding can be caused by scar tissue formation  Noises will often settle over time once muscle strength improves.    DIFFICULTY SLEEPING    It is very common for patients to have difficulty sleeping at night which can be caused pain, medication, or feeling of anxiety.  Sleep disturbance typically worsens 4-6 weeks after surgery.  You are permitted to sleep on your back or side with a  pillow between your legs for comfort            DRIVING & TRAVEL  Your surgeon will address this at your post-op appointment.  You must not be taking narcotic pain medication to be cleared to drive. (Usually at least 2 weeks for RIGHT limb and 3 weeks for LEFT limb before driving is permitted)  LEFT LEG JOINT REPLACMENT:  You may drive once you have regained full control of your leg, typically around 2 week after surgery  RIGHT LEG JOINT REPLACEMENT:  You must speak with your surgeon before you resume driving.  Driving can typically be resumed by 4-6 weeks after surgery.  During long distance travel, you should attempt to change position or stand every hour.  You should complete ankle pumps throughout your travel if you are sitting for long periods of time.  If traveling within the first 2 weeks after surgery, you should wear your compression stockings.    DENTAL & OTHER PROCEDURES    All patients must wait a minimum of 3 months for elective procedures, including routine dental cleanings.  For any dental appointment - cleaning or dental procedures - patients must take a prophylactic antibiotic 1 hour before the appointment.  You will  also need to call for an antibiotic prior to any other invasive test, procedure, or surgery.  These prophylactic antibiotics will be needed for the rest of your life, in order to prevent infections.  Please call your surgeons office at [phone] to request the antibiotic.      PHYSICAL THERAPY    Following surgery it is important to progress through recovery with in-home or outpatient physical therapy.  You should continue to complete home exercises provided from the hospital on days that you are not working with a physical therapist.    It is common to have a temporary increase in pain and swelling upon starting outpatient physical therapy and/or changing your exercise routine.  Continue to use ice to help with symptoms.     FOLLOW-UP APPOINTMENT - [phone]    Your post-surgical appointments will take place approximately 2 weeks, 6 weeks, 3 months and 1 year following surgery.  Joint replacements are monitored thereafter every 2-5 years for life.  If you have any questions or need to make changes to this appointment, please contact the office:    EMERGENCIES & WHEN TO CALL YOUR SURGEON  When to contact our office immediately:  Any falls or injury to the joint replacement  Fever >101.5 for at least 48 hours after surgery or chills.  Excessive bleeding from incision(s). A small amount of drainage is normal and expected.  Signs of infection of incision(s)-excessive drainage that is soaking through your dressing (especially if it is pus-like), redness that is spreading out from the edges of your incision, or increased warmth around the area.  Excruciating pain for which the pain medication, taken as instructed, is not helping.  Severe calf pain.  Go directly to the emergency room or call 911, if you are experiencing chest pain or difficulty breathing.              ICE/COLD THERAPY  Ice is most important during the first 2 weeks after surgery, but should be used for several weeks as needed.  Never place ice, or  cold therapy devices directly on the skin.  You should always have a protective layer between your skin and the cold.  You have been prescribed to ice your total joint at a minimum of twice per hour for 20 minutes while awake during the first 6 weeks after surgery if you are using ice packs. This will help with pain control.  If you are using an ice machine, please follow ice machine instructions.  After knee replacement is extremely important to elevate the leg straight on an incline, not flat.    COLD THERAPY MACHINE RECOMMENDATIONS      Cold therapy devices can be used before and after surgery to assist in comfort and help to reduce pain and swelling.  These devices differ from ice or ice packs whereas the mechanism circulates water through tubing and a pad to provide longer periods of cold therapy to the desired site.  While in the hospital, you can use your cold devices around the clock for optimal comfort.  We recommend using cold therapy after working with therapy or completing exercises on your own.  Once you are discharged home, there is no set schedule in which you must follow while using cold therapy.  Below are a few points to remember when using a cold therapy device:    Read the 's instructions prior to first the use.  Follow instructions for filling the cooler (water first, then ice).  Always make sure there is a layer of protection between the cold pad and your skin (Clothing, Towel, Ace Bandage, etc.)  Allow the device to circulate cold water throughout the pad prior wrapping the pad around your leg (approximately 10 minutes).  Place the pad on your leg in the desired position to meet your pain management needs and use the wraps provided to secure the pad to your body.  The purpose of this device is to use consistently throughout the day.  You do not need to need to use the 20 on, 20 off method when using an ice machine.  During waking hours, remove the cold pad every 1-2 hours to  perform a skin check  Detach the pad from the cooler and ambulate at least once every hour  After removing the pad, allow at least 30 minutes before resuming cold therapy  You may wear the cold therapy device during periods of sleep including overnight    If you wake up during the night, you can check the skin at this time.  You do not need to wake up specifically to perform skin checks.  Empty the cooler and pad when device is not in use.  Follow 's instructions for cleaning your cold therapy device.     DME can assist with problems related to products purchased through the [facility]  [phone] - [place]   or   [phone] - [place]    Sample Medication Schedule  [facility]    Medication Refills - [phone] - Monday through Friday 8:30am-4:30pm  Please allow 3-5 days for medication refill requests to be processed.

## 2025-08-05 ENCOUNTER — DOCUMENTATION (OUTPATIENT)
Dept: HOME HEALTH SERVICES | Facility: HOME HEALTH | Age: 50
End: 2025-08-05

## 2025-08-05 ENCOUNTER — PHARMACY VISIT (OUTPATIENT)
Dept: PHARMACY | Facility: CLINIC | Age: 50
End: 2025-08-05
Payer: MEDICARE

## 2025-08-05 VITALS
DIASTOLIC BLOOD PRESSURE: 50 MMHG | WEIGHT: 106.26 LBS | SYSTOLIC BLOOD PRESSURE: 93 MMHG | TEMPERATURE: 98.1 F | BODY MASS INDEX: 18.14 KG/M2 | HEIGHT: 64 IN | OXYGEN SATURATION: 99 % | RESPIRATION RATE: 14 BRPM | HEART RATE: 79 BPM

## 2025-08-05 LAB
ANION GAP SERPL CALC-SCNC: 9 MMOL/L (ref 10–20)
BUN SERPL-MCNC: 11 MG/DL (ref 6–23)
CALCIUM SERPL-MCNC: 7.4 MG/DL (ref 8.6–10.3)
CHLORIDE SERPL-SCNC: 105 MMOL/L (ref 98–107)
CO2 SERPL-SCNC: 24 MMOL/L (ref 21–32)
CREAT SERPL-MCNC: 0.94 MG/DL (ref 0.5–1.05)
EGFRCR SERPLBLD CKD-EPI 2021: 75 ML/MIN/1.73M*2
ERYTHROCYTE [DISTWIDTH] IN BLOOD BY AUTOMATED COUNT: 12 % (ref 11.5–14.5)
GLUCOSE SERPL-MCNC: 105 MG/DL (ref 74–99)
HCT VFR BLD AUTO: 31.7 % (ref 36–46)
HGB BLD-MCNC: 10.8 G/DL (ref 12–16)
MCH RBC QN AUTO: 32.6 PG (ref 26–34)
MCHC RBC AUTO-ENTMCNC: 34.1 G/DL (ref 32–36)
MCV RBC AUTO: 96 FL (ref 80–100)
NRBC BLD-RTO: 0 /100 WBCS (ref 0–0)
PLATELET # BLD AUTO: 235 X10*3/UL (ref 150–450)
POTASSIUM SERPL-SCNC: 3.9 MMOL/L (ref 3.5–5.3)
RBC # BLD AUTO: 3.31 X10*6/UL (ref 4–5.2)
SODIUM SERPL-SCNC: 134 MMOL/L (ref 136–145)
WBC # BLD AUTO: 13.7 X10*3/UL (ref 4.4–11.3)

## 2025-08-05 PROCEDURE — 2500000001 HC RX 250 WO HCPCS SELF ADMINISTERED DRUGS (ALT 637 FOR MEDICARE OP): Performed by: NURSE PRACTITIONER

## 2025-08-05 PROCEDURE — 2500000005 HC RX 250 GENERAL PHARMACY W/O HCPCS: Performed by: NURSE PRACTITIONER

## 2025-08-05 PROCEDURE — 97530 THERAPEUTIC ACTIVITIES: CPT | Mod: GP

## 2025-08-05 PROCEDURE — 97161 PT EVAL LOW COMPLEX 20 MIN: CPT | Mod: GP

## 2025-08-05 PROCEDURE — 2500000004 HC RX 250 GENERAL PHARMACY W/ HCPCS (ALT 636 FOR OP/ED): Performed by: NURSE PRACTITIONER

## 2025-08-05 PROCEDURE — 97116 GAIT TRAINING THERAPY: CPT | Mod: GP

## 2025-08-05 PROCEDURE — 7100000011 HC EXTENDED STAY RECOVERY HOURLY - NURSING UNIT

## 2025-08-05 PROCEDURE — 94760 N-INVAS EAR/PLS OXIMETRY 1: CPT

## 2025-08-05 PROCEDURE — 99238 HOSP IP/OBS DSCHRG MGMT 30/<: CPT | Performed by: NURSE PRACTITIONER

## 2025-08-05 PROCEDURE — 85027 COMPLETE CBC AUTOMATED: CPT | Performed by: NURSE PRACTITIONER

## 2025-08-05 PROCEDURE — 2500000002 HC RX 250 W HCPCS SELF ADMINISTERED DRUGS (ALT 637 FOR MEDICARE OP, ALT 636 FOR OP/ED): Performed by: NURSE PRACTITIONER

## 2025-08-05 PROCEDURE — 36415 COLL VENOUS BLD VENIPUNCTURE: CPT | Performed by: NURSE PRACTITIONER

## 2025-08-05 PROCEDURE — 80048 BASIC METABOLIC PNL TOTAL CA: CPT | Performed by: NURSE PRACTITIONER

## 2025-08-05 PROCEDURE — 99232 SBSQ HOSP IP/OBS MODERATE 35: CPT | Performed by: NURSE PRACTITIONER

## 2025-08-05 RX ADMIN — DOCUSATE SODIUM 100 MG: 100 CAPSULE, LIQUID FILLED ORAL at 08:06

## 2025-08-05 RX ADMIN — CARISOPRODOL 350 MG: 350 TABLET ORAL at 03:35

## 2025-08-05 RX ADMIN — SODIUM CHLORIDE, SODIUM LACTATE, POTASSIUM CHLORIDE, AND CALCIUM CHLORIDE 100 ML/HR: .6; .31; .03; .02 INJECTION, SOLUTION INTRAVENOUS at 08:01

## 2025-08-05 RX ADMIN — SODIUM CHLORIDE 500 ML: 0.9 INJECTION, SOLUTION INTRAVENOUS at 03:14

## 2025-08-05 RX ADMIN — OXYCODONE HYDROCHLORIDE AND ACETAMINOPHEN 500 MG: 500 TABLET ORAL at 08:06

## 2025-08-05 RX ADMIN — ASPIRIN 81 MG: 81 TABLET, DELAYED RELEASE ORAL at 08:06

## 2025-08-05 RX ADMIN — OXYCODONE HYDROCHLORIDE 10 MG: 10 TABLET ORAL at 10:52

## 2025-08-05 RX ADMIN — LAMOTRIGINE 100 MG: 100 TABLET ORAL at 08:06

## 2025-08-05 RX ADMIN — LIOTHYRONINE SODIUM 5 MCG: 5 TABLET ORAL at 05:42

## 2025-08-05 RX ADMIN — OXYCODONE HYDROCHLORIDE 5 MG: 5 TABLET ORAL at 04:36

## 2025-08-05 RX ADMIN — ACETAMINOPHEN 650 MG: 325 TABLET ORAL at 05:43

## 2025-08-05 RX ADMIN — POTASSIUM CHLORIDE EXTENDED-RELEASE 10 MEQ: 1500 TABLET ORAL at 08:06

## 2025-08-05 RX ADMIN — POLYETHYLENE GLYCOL 3350 17 G: 17 POWDER, FOR SOLUTION ORAL at 08:06

## 2025-08-05 RX ADMIN — BUPROPION HYDROCHLORIDE 300 MG: 150 TABLET, EXTENDED RELEASE ORAL at 08:06

## 2025-08-05 RX ADMIN — LEVOTHYROXINE SODIUM 100 MCG: 100 TABLET ORAL at 05:43

## 2025-08-05 RX ADMIN — Medication: at 08:17

## 2025-08-05 RX ADMIN — VILAZODONE HYDROCHLORIDE 40 MG: 20 TABLET ORAL at 08:06

## 2025-08-05 RX ADMIN — ZIPRASIDONE HCL 40 MG: 20 CAPSULE ORAL at 08:07

## 2025-08-05 ASSESSMENT — PAIN SCALES - GENERAL
PAINLEVEL_OUTOF10: 4
PAINLEVEL_OUTOF10: 3
PAINLEVEL_OUTOF10: 5 - MODERATE PAIN
PAINLEVEL_OUTOF10: 7
PAINLEVEL_OUTOF10: 5 - MODERATE PAIN
PAINLEVEL_OUTOF10: 3
PAINLEVEL_OUTOF10: 5 - MODERATE PAIN
PAINLEVEL_OUTOF10: 5 - MODERATE PAIN

## 2025-08-05 ASSESSMENT — PAIN - FUNCTIONAL ASSESSMENT
PAIN_FUNCTIONAL_ASSESSMENT: 0-10

## 2025-08-05 ASSESSMENT — PAIN DESCRIPTION - DESCRIPTORS
DESCRIPTORS: THROBBING
DESCRIPTORS: DISCOMFORT

## 2025-08-05 ASSESSMENT — COGNITIVE AND FUNCTIONAL STATUS - GENERAL
MOBILITY SCORE: 22
STANDING UP FROM CHAIR USING ARMS: A LITTLE
CLIMB 3 TO 5 STEPS WITH RAILING: A LITTLE
WALKING IN HOSPITAL ROOM: A LITTLE
WALKING IN HOSPITAL ROOM: A LITTLE
CLIMB 3 TO 5 STEPS WITH RAILING: A LITTLE
MOVING TO AND FROM BED TO CHAIR: A LITTLE
DAILY ACTIVITIY SCORE: 23
DRESSING REGULAR LOWER BODY CLOTHING: A LITTLE
MOBILITY SCORE: 20

## 2025-08-05 ASSESSMENT — ACTIVITIES OF DAILY LIVING (ADL)
LACK_OF_TRANSPORTATION: NO
ADL_ASSISTANCE: INDEPENDENT

## 2025-08-05 ASSESSMENT — PAIN DESCRIPTION - LOCATION: LOCATION: KNEE

## 2025-08-05 ASSESSMENT — PAIN DESCRIPTION - ORIENTATION: ORIENTATION: LEFT

## 2025-08-05 NOTE — CONSULTS
Consults    Reason For Consult  Medical management post op etc    History Of Present Illness  Sienna Acuña is a 50 y.o. female who is post op day 0 for left partial arthroplasty with osteotomy of the tibial tuberosity & with reconstruction of the left patella and open repair. Hospitalist consulted for medical management of postop care. Pt resting comfortably in bed. No pertinent labs or dx to review at this time.     Past Medical History  She has a past medical history of Abnormal CT of thoracic spine (2017), Abnormal platelet function test (Multi) (2014), Anemia, Anxiety, Baker's cyst, Baker's cyst of knee, left, Blood in stool (2015), Bright red blood per rectum (2017), Chilblain lupus, Chronic kidney disease (2025), CKD (chronic kidney disease), Clotting disorder (Multi), Depression, Disease of thyroid gland, Gastritis (2022), Hematuria (2016), History of blood transfusion, Hypothyroidism (), IBS (irritable bowel syndrome), Infection due to fungus (2024), Inflammatory bowel disease, Irritation of vulva (2024), Left thigh pain (2014), Migraines, Motion sickness, Muscle strain of gluteal region (2014), OAB (overactive bladder), Osteopenia, Overactive bladder, Platelet disorder (Multi) (), PONV (postoperative nausea and vomiting), Positive JENNIFER (antinuclear antibody), Pyelonephritis (2024), Swelling of extremity (2024), and Von Willebrand disease (Multi).    Surgical History  She has a past surgical history that includes  section, classic (2013); Tubal ligation (2013); Breast surgery (Bilateral); Hysterectomy (04/10/2013); CT angio abdomen pelvis w and or wo IV IV contrast (2014); Esophagogastroduodenoscopy (2022); Sigmoidectomy; Sigmoidectomy; Knee arthroscopy, medial patello femoral ligament repair (Left); Colonoscopy; Knee arthroscopy w/ debridement (Left, 10/30/2024);  section, low transverse  (10-7-2003); Cholecystectomy; Colon surgery; LASIK; pr breast augmentation with implant; and Knee Arthroplasty (Left, 08/04/2025).     Social History  She reports that she has never smoked. She has never used smokeless tobacco. She reports current alcohol use of about 1.0 - 2.0 standard drink of alcohol per week. She reports that she does not use drugs.    Family History  Family History[1]     Allergies  Alendronate    Review of Systems  Constitutional:  Negative for chills, diaphoresis, fever  HENT:  Negative for sore throat and trouble swallowing.    Eyes:  Negative for visual disturbance.        No visual changes.   Respiratory: Neg for cough, shortness of breath and wheezing.    Cardiovascular:  Negative for chest pain, palpitations, orthopnea and leg swelling.   Gastrointestinal: Negative for abdominal pain, constipation, diarrhea, nausea and vomiting.   Genitourinary:  Negative for dysuria, frequency and urgency.   Musculoskeletal:  Negative for back pain and neck pain. No focal weakness.   Neurological:  Negative for dizziness, tremors, seizures, syncope, facial asymmetry, speech difficulty, focal weakness, +numbness in op leg, neg for and headaches.   Psychiatric/Behavioral:  Negative for confusion and hallucinations. The patient is not nervous/anxious.      Physical Exam  Constitutional:       Appearance: Normal appearance.   HENT:      Head: Normocephalic and atraumatic.      Mouth: Mucous membranes are moist.   Neck: No JVD or use of accessory muscles.  Eyes:      Extraocular Movements: Extraocular movements intact.      Conjunctiva/sclera: Conjunctivae normal.   Cardiovascular:      Rate and Rhythm: Normal rate and regular rhythm.      Pulses: Normal pulses.      Heart sounds: Normal heart sounds. No murmur heard.  Pulmonary:      Effort: No respiratory distress.      Breath sounds: No wheezing or rhonchi. No tachypnea or labored breathing  Abdominal:      General: Bowel sounds are normal. There is no  distension.      Palpations: Abdomen is soft. Tenderness: There is no abdominal tenderness. There is no guarding or rebound.   Extremities: No swelling or palpable distal pulses.   Musculoskeletal:         General: No swelling or tenderness. Immobilizer to LLE in place. Unable to dorsiflex Left foot.     Comments: No focal weakness.  Neurological:      Mental Status: She is alert and oriented to person, place, and time.      Sensory: + focal sensory deficit to op leg.      Motor: + focal weakness.      Coordination: Coordination normal.   Psychiatric:         Mood and Affect: Mood normal.         Behavior: Behavior normal.      Last Recorded Vitals  /64   Pulse 79   Temp 36.4 °C (97.5 °F)   Resp 17   Wt 48.2 kg (106 lb 4.2 oz)   SpO2 96%     Relevant Results  Scheduled medications  Scheduled Medications[2]  Continuous medications  Continuous Medications[3]  PRN medications  PRN Medications[4]    Results for orders placed or performed during the hospital encounter of 08/04/25 (from the past 24 hours)   POCT GLUCOSE   Result Value Ref Range    POCT Glucose 70 (L) 74 - 99 mg/dL   VERIFY ABO/Rh Group Test   Result Value Ref Range    ABO TYPE O     Rh TYPE POS      XR knee left 1-2 views  Result Date: 8/4/2025  Interpreted By:  Ananth Jones, STUDY: XR KNEE LEFT 1-2 VIEWS; ;  8/4/2025 4:17 pm   INDICATION: Signs/Symptoms:SP LTKA.     COMPARISON: None.   ACCESSION NUMBER(S): [id number]   ORDERING CLINICIAN: WENDY FRANCOIS   FINDINGS: Interval arguing compartmental total-knee arthroplasty of the patellofemoral compartment. No periprosthetic fracture or lucency seen. Postsurgical changes in the tibial tubercle as well.       No immediate postsurgical complication about the left knee     MACRO: None   Signed by: Ananth Jones 8/4/2025 5:28 PM Dictation workstation:   [id number]      Assessment/Plan   Post Op day 0 for left partial arthroplasty with osteotomy of the tibial tuberosity & with reconstruction  of the left patella and open repair/Post Op Care  Supportive care/Ortho primary  Postop antibiotics and IV fluids  Pain control/OARRS  Bowel regimen  NV checks  F/U morning labs  PT/OT/SW    Hypotension  Could be life threatening  Bolus overnight and monitor  Monitor use of narcotics    Hypothyroidism  Resume Synthroid, Cytomel    Anxiety/depression  Resume Wellbutrin, Lamictal, Geodon, Viibryd    Overactive bladder  Resume Ditropan    IBS  Ibsrela resumed    Full Code    DVT PX/Hx Von Willebrand disease  Aspirin as recommended by orthopedics and ordered by Ortho  CYNTHIA hose  SCDs   Mellissa Croft, APRN-CNP  75 min spent interviewing and assessing patient, placing orders, updating MAR, care plan and code status.         [1]   Family History  Problem Relation Name Age of Onset    Diabetes Other grandparent     Other (thyroid disorder) Other grandparent     Endometriosis Other aunt     Cancer Other      Diabetes Other      Endometriosis Other      Other (thyroid disorder) Other      Blood Disorder Mother Mom     Cancer Mother Mom     Clotting disorder Mother Mom     Colon cancer Mother Mom     Arthritis Maternal Grandmother Grandma     Kidney disease Maternal Grandmother Grandma     Diabetes Paternal Grandfather Maximilian Vargas     Di[facility] 40 [facility] 49    Stroke Brother Mando CARDONA [facility]     Kidney disease Maternal Grandmother Grandma     Arthritis Maternal Grandmother Grandma     Clotting disorder Mother Mom     Cancer Mother Mom     Colon cancer Mother Mom     Blood Disorder Mother Mom     Diabetes Brother Mando Vargas     Diabetes Paternal Grandfather [facility]     Stroke Brother [facility]     Asthma Other Son Jez duff 0 - 9   [2] acetaminophen, 650 mg, oral, q6h MICHELLE  ascorbic acid, 500 mg, oral, BID  [START ON 8/5/2025] aspirin, 81 mg, oral, BID  [START ON 8/5/2025] buPROPion XL, 300 mg, oral, q AM  ceFAZolin, 2 g, intravenous, q6h  docusate sodium, 100 mg, oral, BID  lamoTRIgine, 100 mg,  oral, BID  [START ON 8/5/2025] levothyroxine, 100 mcg, oral, Daily  liothyronine, 5 mcg, oral, BID  oxyBUTYnin, 5 mg, oral, TID  polyethylene glycol, 17 g, oral, Daily  potassium chloride CR, 10 mEq, oral, Daily  tenapanor, 50 mg, oral, BID  [START ON 8/5/2025] vilazodone, 40 mg, oral, Daily with breakfast  ziprasidone, 40 mg, oral, BID  [3] lactated Ringer's, 100 mL/hr, Last Rate: 100 mL/hr (08/04/25 1653)  oxygen, , Last Rate: Stopped (08/04/25 1710)  [4] PRN medications: carisoprodol, gabapentin, morphine, naloxone, ondansetron ODT **OR** ondansetron, oxyCODONE, oxyCODONE

## 2025-08-05 NOTE — PROGRESS NOTES
Patient: Sienna Acuña  Room/bed: 140/140-A  Admitted on: 8/4/2025    Age: 50 y.o.   Gender: female  Code Status:  Full Code   Admitting Dx: Primary osteoarthritis of left knee [M17.12]  Patellofemoral disorder of left knee [M22.2X2]  Osteoarthritis of left knee, unspecified osteoarthritis type [M17.12]    MRN: [id number]  PCP: Carrol Yepez DO       Subjective   Seen and examined in her room this AM. Up in chair, awaiting PT/OT evaluation. She denies chest pain, breathing difficulties, abdominal pain, N/V/D/C, fever, or chills.  Hopeful to dc home today. States BP is low/normal at baseline.     Objective    Physical Exam   Constitutional: A&O x 3; NAD; calm and cooperative  Eyes: EOM's intact  HEENT: Normocephalic, Atraumatic. Oral mucosa moist.   Neck: Supple. No JVD, lymphadenopathy.   Lungs: CTAB with fair air movement. Respirations even and unlabored on room air.   Heart: RRR  Abdomen: Soft, non-tender, non-distended, +BS  MS/Extremities: ROCK x 4 with LLE immobilized. No edema. Peripheral pulses intact bilaterally.   Neuro: A&O x3; no focal deficits; gross motor and sensation intact.   Skin: Warm and dry. No rashes or lesions  Psych: Normal affect.      Temp:  [36.4 °C (97.5 °F)-36.7 °C (98.1 °F)] 36.7 °C (98.1 °F)  Heart Rate:  [67-81] 79  Resp:  [12-18] 14  BP: ()/(50-79) 93/50  FiO2 (%):  [21 %] 21 %    Vitals:    08/04/25 0930   Weight: 48.2 kg (106 lb 4.2 oz)       FiO2 (%):  [21 %] 21 %     I/Os    Intake/Output Summary (Last 24 hours) at 8/5/2025 1208  Last data filed at 8/5/2025 0429  Gross per 24 hour   Intake 1958.33 ml   Output 5 ml   Net 1953.33 ml       Labs:   Results from last 72 hours   Lab Units 08/05/25  0509   SODIUM mmol/L 134*   POTASSIUM mmol/L 3.9   CHLORIDE mmol/L 105   CO2 mmol/L 24   BUN mg/dL 11   CREATININE mg/dL 0.94   GLUCOSE mg/dL 105*   CALCIUM mg/dL 7.4*   ANION GAP mmol/L 9*   EGFR mL/min/1.73m*2 75      Results from last 72 hours   Lab Units 08/05/25  0509   WBC AUTO  x10*3/uL 13.7*   HEMOGLOBIN g/dL 10.8*   HEMATOCRIT % 31.7*   PLATELETS AUTO x10*3/uL 235      Lab Results   Component Value Date    CALCIUM 7.4 (L) 08/05/2025    PHOS 2.4 (L) 01/22/2025      Lab Results   Component Value Date    CRP <3.0 02/03/2025      Micro/ID:   No results found for the last 90 days.    .ID  Lab Results   Component Value Date    URINECULTURE No growth 07/01/2024       Images:  XR knee left 1-2 views  Narrative: Interpreted By:  Ananth Jones,   STUDY:  XR KNEE LEFT 1-2 VIEWS; ;  8/4/2025 4:17 pm      INDICATION:  Signs/Symptoms:SP LTKA.          COMPARISON:  None.      ACCESSION NUMBER(S):  [id number]      ORDERING CLINICIAN:  WENDY FRANCOIS      FINDINGS:  Interval arguing compartmental total-knee arthroplasty of the  patellofemoral compartment. No periprosthetic fracture or lucency  seen. Postsurgical changes in the tibial tubercle as well.      Impression: No immediate postsurgical complication about the left knee          MACRO:  None      Signed by: Ananth Jones 8/4/2025 5:28 PM  Dictation workstation:   [id number]       Meds    Scheduled medications  Scheduled Medications[1]  Continuous medications  Continuous Medications[2]  PRN medications  PRN Medications[3]     Assessment and Plan    Sienna Acuña is a 50 y.o. female with a medical history of IBS, Von Willebrand Disease, and OA with left knee patellofemoral disorder, who presented to [facility] for an elective left partial knee arthroplasty, patella reconstruction, osteotomy, tibial tuberosity. Consulted for medical management.     Hypotension - Borderline  -This is her baseline and stable.  -Lowest documented SBP 89.     Left Knee Osteoarthritis with Patellofemoral Disorder  -S/P ARTHROPLASTY, KNEE, PARTIAL - Left OSTEOTOMY, TIBIAL TUBEROSITY - Left  RECONSTRUCTION, PATELLA - Left ARTHROSCOPY, KNEE, WITH OPEN REPAIR by Dr. Green on 8/4/25  -Incisional care, antibiotics, activity restrictions per orthopedics surgery.  -PT/OT to  follow. WBAT with hinged knee brace.   -Medicate for pain  -Maintain bowel regimen  -Advised IS use, mobilization.   -Monitor H&H for ABLA. Hgb stable at 10.8.  -DVT prophylaxis per surgery: ASA 81mg BID  -Afebrile. No significant leukocytosis.     Hypothyroidism   -On Levothyroxine, Cytomel    Von Willebrand Disease  -ASA per orthopedics    Anxiety/Depression  -Mood stable on home regimen:  Wellbutrin, Lamictal, Geodon, Viibryd     IBS  -On Tenopanor    Fluids/Electrolytes/Nutrition  -Laboratory data reviewed.   -Electrolytes stable.   -No nutritional concerns at this time.      Disposition  -Plan of care discussed with medicine.   -Discharge per orthopedics. Medically stable.       Ros Martin, APRN-CNP        [1] acetaminophen, 650 mg, oral, q6h MICHELLE  ascorbic acid, 500 mg, oral, BID  aspirin, 81 mg, oral, BID  buPROPion XL, 300 mg, oral, q AM  docusate sodium, 100 mg, oral, BID  lamoTRIgine, 100 mg, oral, BID  levothyroxine, 100 mcg, oral, Daily  liothyronine, 5 mcg, oral, BID  oxyBUTYnin, 5 mg, oral, TID  polyethylene glycol, 17 g, oral, Daily  potassium chloride CR, 10 mEq, oral, Daily  tenapanor, 50 mg, oral, BID  vilazodone, 40 mg, oral, Daily with breakfast  ziprasidone, 40 mg, oral, BID  [2] lactated Ringer's, 100 mL/hr, Last Rate: 100 mL/hr (08/05/25 0801)  oxygen, , Last Rate: 0 Dose (08/04/25 1710)  [3] PRN medications: carisoprodol, gabapentin, morphine, naloxone, ondansetron ODT **OR** ondansetron, oxyCODONE, oxyCODONE

## 2025-08-05 NOTE — DISCHARGE INSTR - ACTIVITY
Additional Details: Postoperatively the patient will utilize a postoperative hinged knee brace she will be allowed to weight-bear as tolerated locked in extension.  Nonweightbearing range of motion allowed up to 90 degrees within the first 6 weeks postsurgery.  Stage progressive range of motion goal 0 to 30 degrees in the first 2 weeks postsurgery, 0 to 60 degrees by the first 4 weeks postsurgery, 0 to 90 degrees x 6 weeks postsurgery.

## 2025-08-05 NOTE — HH CARE COORDINATION
Home Care received a Referral for Physical Therapy. We have processed the referral for a Start of Care within 48 hours of 08/06.     If you have any questions or concerns, please feel free to contact us at [phone]. Follow the prompts, enter your five digit zip code, and you will be directed to your care team on [facility].

## 2025-08-05 NOTE — DISCHARGE SUMMARY
Discharge Diagnosis  Osteoarthritis of left knee, unspecified osteoarthritis type    Issues Requiring Follow-Up  Post surgical follow up     Test Results Pending At Discharge  Pending Labs       No current pending labs.            Hospital Course  The patient is a pleasant 50 year old female who underwent a left partial knee arthroplasty, osteotomy, patella reconstruction performed by Dr. Green at [facility] on 8/4/25. Patient had a routine uncomplicated preoperative, intraoperative and immediate postoperative surgical course. As planned postoperatively the patient was admitted to the regular nursing floor at [facility]. While in the regular nursing floor patient received consultations from both internal medicine as well as physical therapy. Patient received preoperative and postoperative intravenous antibiotics. Pain control is with a combination of both oral and IV narcotic and nonnarcotic medications. DVT prophylaxis was with sequentials early ambulation and aspirin therapy. Anticoagulation medications will be continued for minimum of 30 days postsurgery. Patient was discharged home with home physical therapy and home health care outpatient follow-up is being arranged for 2 weeks in the outpatient clinic postdischarge.      Postoperatively the patient will utilize a postoperative hinged knee brace she will be allowed to weight-bear as tolerated locked in extension. Nonweightbearing range of motion allowed up to 90 degrees within the first 6 weeks postsurgery. Stage progressive range of motion goal 0 to 30 degrees in the first 2 weeks postsurgery, 0 to 60 degrees by the first 4 weeks postsurgery, 0 to 90 degrees x 6 weeks postsurgery. Patient will be followed with postoperative x-rays to follow the healing of her osteotomy site at which point we will advance activities and range of motion accordingly. Patient was kept in the hospital overnight for monitoring given her unspecified platelet  disorder.    Visit Vitals  BP 93/50 (BP Location: Left arm, Patient Position: Sitting)   Pulse 79   Temp 36.7 °C (98.1 °F) (Tympanic)   Resp 14     Vitals:    08/04/25 0930   Weight: 48.2 kg (106 lb 4.2 oz)       Immunization History   Administered Date(s) Administered    Flu vaccine (IIV4), preservative free *Check age/dose* 10/02/2008, 10/08/2018, 10/07/2019, 10/19/2021, 10/26/2023    Flu vaccine, quadrivalent, recombinant, preservative free, adult (FLUBLOK) 11/18/2022    Hep A, Unspecified 08/30/2016    Hepatitis B vaccine, 18yrs and older(HEPLISAV) 04/13/2023, 05/15/2023    Influenza, Unspecified 09/18/2016    Novel influenza-H1N1-09, preservative-free 11/12/2009    Tdap vaccine, age 7 year and older (BOOSTRIX, ADACEL) 08/19/2006, 07/18/2016       Results  XR knee left 1-2 views  Result Date: 8/4/2025  Interpreted By:  Ananth Jones, STUDY: XR KNEE LEFT 1-2 VIEWS; ;  8/4/2025 4:17 pm   INDICATION: Signs/Symptoms:SP LTKA.     COMPARISON: None.   ACCESSION NUMBER(S): [id number]   ORDERING CLINICIAN: WENDY FRANCOIS   FINDINGS: Interval arguing compartmental total-knee arthroplasty of the patellofemoral compartment. No periprosthetic fracture or lucency seen. Postsurgical changes in the tibial tubercle as well.       No immediate postsurgical complication about the left knee     MACRO: None   Signed by: Ananth Jones 8/4/2025 5:28 PM Dictation workstation:   [id number]    Pertinent Physical Exam At Time of Discharge  Physical Exam  Vitals reviewed.   Constitutional:       Appearance: Normal appearance.   HENT:      Head: Normocephalic and atraumatic.     Eyes:      Conjunctiva/sclera: Conjunctivae normal.      Pupils: Pupils are equal, round, and reactive to light.       Cardiovascular:      Rate and Rhythm: Normal rate and regular rhythm.      Pulses: Normal pulses.   Pulmonary:      Effort: Pulmonary effort is normal.   Abdominal:      Palpations: Abdomen is soft.     Musculoskeletal:      Cervical back:  Neck supple.      Comments: Left knee dressing without strikethrough, in left knee immobilizer thigh and calf supple, negative Marcelo's, leg and foot warm and well perfused.  Left knee flexion 0 degrees, extension 0 degrees, quad recruitment strength 4/5, SILT S/S/SPN/DPN distributions, no foot drop, 5/5 dorsi and plantarflexion strength.       Skin:     General: Skin is warm and dry.      Capillary Refill: Capillary refill takes less than 2 seconds.     Neurological:      General: No focal deficit present.      Mental Status: She is alert and oriented to person, place, and time.     Psychiatric:         Mood and Affect: Mood normal.         Behavior: Behavior normal.         Thought Content: Thought content normal.         Judgment: Judgment normal.         Home Medications     Medication List      START taking these medications     ascorbic acid 500 mg tablet; Commonly known as: Vitamin C; Take 1 tablet   (500 mg) by mouth 2 times a day.   aspirin 81 mg chewable tablet; Chew and swallow 1 tablet (81 mg) 2 times   a day for 28 days.   carisoprodol 350 mg tablet; Commonly known as: Soma; Take 1 tablet (350   mg) by mouth 3 times a day as needed for muscle spasms.   gabapentin 300 mg capsule; Commonly known as: Neurontin; Take 1 capsule   (300 mg) by mouth 3 times a day as needed (nerve pain).   oxyCODONE-acetaminophen 5-325 mg tablet; Commonly known as: Percocet;   Take 1 tablet by mouth every 4 hours if needed for severe pain (7 - 10).     CONTINUE taking these medications     buPROPion  mg 24 hr tablet; Commonly known as: Wellbutrin XL   cholecalciferol 1.25 mg (50,000 units) capsule; Commonly known as:   Vitamin D-3   Ibsrela 50 mg tablet tablet; Generic drug: tenapanor   lamoTRIgine 100 mg tablet; Commonly known as: LaMICtal   levothyroxine 100 mcg tablet; Commonly known as: Synthroid, Levoxyl;   Take 1 tablet (100 mcg) by mouth early in the morning.. Take on an empty   stomach at the same time each day,  either 30 to 60 minutes prior to   breakfast   liothyronine 5 mcg tablet; Commonly known as: Cytomel   Myrbetriq 50 mg tablet extended release 24 hr 24 hr tablet; Generic   drug: mirabegron; Take 1 tablet (50 mg) by mouth once daily.   onabotulinumtoxinA 100 unit injection; Commonly known as: Botox;   Provider to inject a total of 300 units into the muscles every 90 days for   migraine prevention and dystonia. For office use only.   ondansetron 4 mg tablet; Commonly known as: Zofran   Ozempic 0.25 mg or 0.5 mg (2 mg/3 mL) pen injector; Generic drug:   semaglutide   potassium chloride CR 10 mEq ER tablet; Commonly known as: Klor-Con;   Take 1 tablet (10 mEq) by mouth once daily. Do not crush, chew, or split.   Ubrelvy 100 mg tablet; Generic drug: ubrogepant   vilazodone 40 mg tablet; Commonly known as: Viibryd   ziprasidone 20 mg capsule; Commonly known as: Geodon     STOP taking these medications     diclofenac epolamine 1.3 % patch   estradiol 0.01 % (0.1 mg/gram) vaginal cream; Commonly known as: Estrace       Outpatient Follow-Up  Future Appointments   Date Time Provider Department Center   8/25/2025 10:00 AM CON MAMMO 1 [facility]   9/5/2025  1:00 PM Mervat Pang PA-C [id number] [facility]   10/17/2025  9:20 AM Angel Morrow MD [id number] [facility]   10/21/2025  9:30 AM Loretta Olson MD [id number] [facility]   11/19/2025  3:30 PM Carrol Yepez DO [id number] [facility]       Lianne English, APRN-CNP

## 2025-08-05 NOTE — PROGRESS NOTES
Physical Therapy    Physical Therapy Evaluation & Treatment & Discharge    Patient Name: Sienna Acuña  MRN: [id number]  Department: [address]  [facility]  Today's Date: 8/5/2025   Time Calculation  Start Time: 1135  Stop Time: 1236  Time Calculation (min): 61 min    Assessment/Plan   PT Assessment  PT Assessment Results: Decreased strength, Decreased range of motion, Impaired balance, Decreased mobility  Rehab Prognosis: Excellent  Barriers to Discharge Home: No anticipated barriers  Evaluation/Treatment Tolerance: Patient tolerated treatment well  Medical Staff Made Aware: Yes  Strengths: Ability to acquire knowledge, Attitude of self, Coping skills, Capable of completing ADLs semi/independent, Housing layout, Living arrangement secure, Premorbid level of function, Support and attitude of living partners  Barriers to Participation: Comorbidities  End of Session Communication: Bedside nurse  Assessment Comment: Pt. is functioning at a supervised to modified IND level for basic mobility tasks.  She and her  have been educated in how to complete bed mobility, transfers, car transfers, ambulation with FWW, stair negotiation and HEP in preparation for safe DC to home. Issued and adjusted a FWW for home-going for pt.  is able to assist as needed.  Pt. is ready for DC to home.  Recommend LOW INTENSITY PT INTERVENTION at discharge with family assist.  End of Session Patient Position: Up in chair, Alarm off, caregiver present   IP OR SWING BED PT PLAN  Inpatient or Swing Bed: Inpatient  PT Plan  PT Plan: PT Eval only  PT Eval Only Reason: Safe to return home  PT Frequency: PT eval only  PT Discharge Recommendations: Low intensity level of continued care  Equipment Recommended upon Discharge: Wheeled walker (issued and adjusted for pt for home-going)  PT Recommended Transfer Status: Stand by assist (during ambulation)  PT - OK to Discharge: Yes      Subjective     PT Visit Info:  PT Received On:  08/05/25  General Visit Information:  General  Reason for Referral: 51 yo female referred to PT for impaired mobility/gait trainining s/p left partial arthroplasty with osteotomy of the tibial tuberosity & with reconstruction of the left patella and open repair by Dr Shai Green on 8/4/2025  Referred By: STEFFI Johnston-CNP  Past Medical History Relevant to Rehab: PSH: medial patello femoral ligament repair (Left); Knee arthroscopy w/ debridement (Left, 10/30/2024);See EMR for full PMH  Family/Caregiver Present: Yes  Caregiver Feedback:  present and supportive  Prior to Session Communication: Bedside nurse  Patient Position Received: Up in chair, Alarm off, not on at start of session  General Comment: Pleasant, cooperative, agreeable to therapy assessment  Home Living:  Home Living  Type of Home: House  Lives With: Spouse  Home Adaptive Equipment: Crutches  Home Layout: One level  Home Access: Stairs to enter with rails  Entrance Stairs-Rails: Right  Entrance Stairs-Number of Steps: 4  Bathroom Toilet: Standard  Prior Level of Function:  Prior Function Per Pt/Caregiver Report  Level of [place]: Independent with ADLs and functional transfers, Independent with homemaking with ambulation  ADL Assistance: Independent  Homemaking Assistance: Independent  Ambulatory Assistance: Independent (no device)  Vocational: Full time 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  NWB ROM allowed up to 90 degrees within the first 6 weeks postsx.  Stage progressive ROM goal 0 to 30 degrees in the first 2 weeks postsx, 0 to 60 degrees by the first 4 weeks postsurgery, 0 to 90 degrees x 6 weeks postsx)  Braces Applied: Left hinged  knee brace donned.  Precautions Comment: Maintained knee in extenstion throughout session and during all weight-bearing.      Objective   Pain:  Pain Assessment  Pain Assessment: 0-10  0-10 (Numeric) Pain Score: 4  Pain Type: Surgical pain  Pain Location: Knee  Pain Orientation: Left  Pain Interventions: Ambulation/increased activity, Repositioned  Response to Interventions: Content/relaxed (at end of session with LE elevated)  Cognition:  Cognition  Overall Cognitive Status: Within Functional Limits  Orientation Level: Oriented X4    General Assessments:     Activity Tolerance  Endurance: Endurance does not limit participation in activity  Early Mobility/Exercise Safety Screen: Proceed with mobilization - No exclusion criteria met    Sensation  Light Touch: Partial deficits in the LLE (L foot completely numb to light touch; able to sense some deep pressure)    Strength  Strength Comments: RLE WNL (5/5), L hip 4/5, L knee 3+/5, L ankle 0/5 d/t nerve block with sx - anticipate full recovery  Strength  Strength Comments: RLE WNL (5/5), L hip 4/5, L knee 3+/5, L ankle 0/5 d/t nerve block with sx - anticipate full recovery    Coordination  Movements are Fluid and Coordinated: Yes    Functional Assessments:  Bed Mobility  Bed Mobility: Yes  Bed Mobility 1  Bed Mobility 1: Supine to sitting, Sitting to supine  Level of Assistance 1: Independent  Bed Mobility Comments 1: pt. able to self assist LLE into bed using knee brace    Transfers  Transfer: Yes  Transfer 1  Transfer From 1: Chair with arms to  Transfer to 1: Stand  Technique 1: Sit to stand, Stand to sit  Transfer Device 1: Walker  Transfer Level of Assistance 1: Modified independent  Trials/Comments 1: multiple trials, to/from various surfaces using safe technique    Ambulation/Gait Training  Ambulation/Gait Training Performed: Yes  Ambulation/Gait Training 1  Surface 1: Level tile  Device 1: Rolling walker  Gait Support Devices:  (Left hinged knee brace locked in  extension)  Assistance 1: Close supervision, Minimal verbal cues  Quality of Gait 1: Narrow base of support, Foot drop/steppage gait (pt. instructed in step-to gait pattern to safely manage current L foot drop & numbness.  Pt. able to implement safely with WBAT LLE with knee brace donned/locked in ext)  Comments/Distance (ft) 1: 60 ft x 2    Stairs  Stairs: Yes  Stairs  Rails 1: Bilateral  Device 1: Railing  Assistance 1: Close supervision  Comment/Number of Steps 1: 4 steps, step-to pattern, pt. needing to hip hike in order to safely lift LLE/L foot onto step d/t foot drop present.   present and educated on how to safely assist pt for home-going  Extremity/Trunk Assessments:  RUE   RUE : Within Functional Limits  LUE   LUE: Within Functional Limits  RLE   RLE : Within Functional Limits  LLE   LLE :  (Hip WFL, knee locked in extension brace, ankle no AROM (PROM WFL))  Treatments:     Therapeutic Activity  Therapeutic Activity Performed: Yes  Therapeutic Activity 1: Reviewed Orthopedic precautions per surgeon's orders, adjusted hinged knee brace according to surgeon's orders, instructed pt. and family how to adjust brace as needed for swelling changes in LLE, reviewed safe use of FWW during all mobility tasks with LLE in knee brace locked in extension, car transfers with knee brace locked in ext, stair negotiation, HEP program within limits of surgeon's orders, safety and pain management for home-going.  Pt. and  demo understanding.    Ambulation/Gait Training  Ambulation/Gait Training Performed: Yes  Ambulation/Gait Training 1  Surface 1: Level tile  Device 1: Rolling walker  Gait Support Devices:  (Left hinged knee brace locked in extension)  Assistance 1: Close supervision, Minimal verbal cues  Quality of Gait 1: Narrow base of support, Foot drop/steppage gait (pt. instructed in step-to gait pattern to safely manage current L foot drop & numbness.  Pt. able to implement safely with WBAT LLE with knee  brace donned/locked in ext)  Comments/Distance (ft) 1: 60 ft x 2  Transfers  Transfer: Yes  Transfer 1  Transfer From 1: Chair with arms to  Transfer to 1: Stand  Technique 1: Sit to stand, Stand to sit  Transfer Device 1: Walker  Transfer Level of Assistance 1: Modified independent  Trials/Comments 1: multiple trials, to/from various surfaces using safe technique    Stairs  Stairs: Yes  Stairs  Rails 1: Bilateral  Device 1: Railing  Assistance 1: Close supervision  Comment/Number of Steps 1: 4 steps, step-to pattern, pt. needing to hip hike in order to safely lift LLE/L foot onto step d/t foot drop present.   present and educated on how to safely assist pt for home-going  Outcome Measures:  [facility] Basic Mobility  Turning from your back to your side while in a flat bed without using bedrails: None  Moving from lying on your back to sitting on the side of a flat bed without using bedrails: None  Moving to and from bed to chair (including a wheelchair): None  Standing up from a chair using your arms (e.g. wheelchair or bedside chair): None  To walk in hospital room: A little  Climbing 3-5 steps with railing: A little  Basic Mobility - Total Score: 22    Encounter Problems       Encounter Problems (Resolved)       Mobility       Goal 1 Pt. and  will demo understanding of how to safely complete bed mobility, transfers, ambulation with FWW and L knee hinged brace locked in ext, stair negotiation with brace, and HEP for home-going. (Met)       Start:  08/05/25    Expected End:  08/05/25    Resolved:  08/05/25                Education Documentation  Handouts, taught by Sienna Rojas PT at 8/5/2025  1:03 PM.  Learner: Significant Other, Patient  Readiness: Eager  Method: Explanation, Demonstration, Handout  Response: Verbalizes Understanding, Demonstrated Understanding  Comment: See documentation for details    Precautions, taught by Sienna Rojas PT at 8/5/2025  1:03 PM.  Learner: Significant  Other, Patient  Readiness: Eager  Method: Explanation, Demonstration, Handout  Response: Verbalizes Understanding, Demonstrated Understanding  Comment: See documentation for details    Body Mechanics, taught by Sienna Rojas, PT at 8/5/2025  1:03 PM.  Learner: Significant Other, Patient  Readiness: Eager  Method: Explanation, Demonstration, Handout  Response: Verbalizes Understanding, Demonstrated Understanding  Comment: See documentation for details    Home Exercise Program, taught by Sienna Rojas, PT at 8/5/2025  1:03 PM.  Learner: Significant Other, Patient  Readiness: Eager  Method: Explanation, Demonstration, Handout  Response: Verbalizes Understanding, Demonstrated Understanding  Comment: See documentation for details    Mobility Training, taught by Sienna Rojas, PT at 8/5/2025  1:03 PM.  Learner: Significant Other, Patient  Readiness: Eager  Method: Explanation, Demonstration, Handout  Response: Verbalizes Understanding, Demonstrated Understanding  Comment: See documentation for details    Education Comments  No comments found.

## 2025-08-05 NOTE — PROGRESS NOTES
08/05/25 0941   Discharge Planning   Living Arrangements Spouse/significant other   Support Systems Spouse/significant other;Family members;Friends/neighbors   Assistance Needed Patient is A&O X3, on room air, independent with ADLs and uses no DME at baseline. Patient has crutches to use post-op but would like a walker to go home with. Patient was driving prior to surgery. Patient voices no other DC needs other than new [facility] that referral was previously sent for prior to surgery.   Type of Residence Private residence   Number of Stairs to Enter Residence 4   Number of Stairs Within Residence 0   Do you have animals or pets at home? Yes   Type of Animals or Pets 1 cat   Who is requesting discharge planning? Provider   Home or Post Acute Services In home services   Type of Home Care Services Home PT   Expected Discharge Disposition Home H   Does the patient need discharge transport arranged? No   Financial Resource Strain   How hard is it for you to pay for the very basics like food, housing, medical care, and heating? Not hard   Housing Stability   In the last 12 months, was there a time when you were not able to pay the mortgage or rent on time? N   At any time in the past 12 months, were you homeless or living in a shelter (including now)? N   Transportation Needs   In the past 12 months, has lack of transportation kept you from medical appointments or from getting medications? no   In the past 12 months, has lack of transportation kept you from meetings, work, or from getting things needed for daily living? No   Stroke Family Assessment   Stroke Family Assessment Needed No   Intensity of Service   Intensity of Service 0-30 min

## 2025-08-06 ENCOUNTER — APPOINTMENT (OUTPATIENT)
Dept: RADIOLOGY | Facility: HOSPITAL | Age: 50
End: 2025-08-06
Payer: COMMERCIAL

## 2025-08-06 ENCOUNTER — HOME CARE VISIT (OUTPATIENT)
Dept: HOME HEALTH SERVICES | Facility: HOME HEALTH | Age: 50
End: 2025-08-06
Payer: COMMERCIAL

## 2025-08-06 VITALS
HEART RATE: 82 BPM | TEMPERATURE: 97.6 F | RESPIRATION RATE: 18 BRPM | OXYGEN SATURATION: 99 % | SYSTOLIC BLOOD PRESSURE: 114 MMHG | DIASTOLIC BLOOD PRESSURE: 68 MMHG

## 2025-08-06 PROCEDURE — G0151 HHCP-SERV OF PT,EA 15 MIN: HCPCS

## 2025-08-06 SDOH — HEALTH STABILITY: PHYSICAL HEALTH
EXERCISE COMMENTS: SUPINE EXERCISES:   -ANKLE PUMPS  -QUAD SETS  -GLUTE SETS  -HEEL SLIDES KNEE BRACE SET TO 30 DEGREE FLEXION  -HIP ABDUCITON/ADDUCTION  -SAQ

## 2025-08-06 ASSESSMENT — ACTIVITIES OF DAILY LIVING (ADL)
ENTERING_EXITING_HOME: CONTACT GUARD ASSIST
AMBULATION ASSISTANCE ON FLAT SURFACES: 1
OASIS_M1830: 03

## 2025-08-06 ASSESSMENT — ENCOUNTER SYMPTOMS
SLEEP QUALITY: FAIR
ANGER WITHIN DEFINED LIMITS: 1
AGGRESSION WITHIN DEFINED LIMITS: 1

## 2025-08-08 ENCOUNTER — APPOINTMENT (OUTPATIENT)
Dept: ORTHOPEDIC SURGERY | Facility: CLINIC | Age: 50
End: 2025-08-08
Payer: COMMERCIAL

## 2025-08-08 ENCOUNTER — HOME CARE VISIT (OUTPATIENT)
Dept: HOME HEALTH SERVICES | Facility: HOME HEALTH | Age: 50
End: 2025-08-08
Payer: COMMERCIAL

## 2025-08-08 VITALS
RESPIRATION RATE: 18 BRPM | TEMPERATURE: 98 F | SYSTOLIC BLOOD PRESSURE: 108 MMHG | HEART RATE: 87 BPM | DIASTOLIC BLOOD PRESSURE: 58 MMHG | OXYGEN SATURATION: 97 %

## 2025-08-08 PROCEDURE — G0157 HHC PT ASSISTANT EA 15: HCPCS | Mod: CQ

## 2025-08-08 ASSESSMENT — ENCOUNTER SYMPTOMS
PAIN LOCATION: LEFT KNEE
LOWEST PAIN SEVERITY IN PAST 24 HOURS: 4/10
PERSON REPORTING PAIN: PATIENT
PAIN LOCATION - PAIN SEVERITY: 5/10
PAIN SEVERITY GOAL: 0/10
HIGHEST PAIN SEVERITY IN PAST 24 HOURS: 7/10
SUBJECTIVE PAIN PROGRESSION: UNCHANGED
PAIN: 1

## 2025-08-08 ASSESSMENT — PAIN SCALES - PAIN ASSESSMENT IN ADVANCED DEMENTIA (PAINAD)
CONSOLABILITY: 0 - NO NEED TO CONSOLE.
BODYLANGUAGE: 0 - RELAXED.
FACIALEXPRESSION: 0
CONSOLABILITY: 0
FACIALEXPRESSION: 0 - SMILING OR INEXPRESSIVE.
BODYLANGUAGE: 0
TOTALSCORE: 0
BREATHING: 0
NEGVOCALIZATION: 0
NEGVOCALIZATION: 0 - NONE.

## 2025-08-12 ENCOUNTER — HOME CARE VISIT (OUTPATIENT)
Dept: HOME HEALTH SERVICES | Facility: HOME HEALTH | Age: 50
End: 2025-08-12
Payer: COMMERCIAL

## 2025-08-12 ENCOUNTER — TELEPHONE (OUTPATIENT)
Dept: INPATIENT UNIT | Facility: HOSPITAL | Age: 50
End: 2025-08-12
Payer: COMMERCIAL

## 2025-08-12 PROCEDURE — G0157 HHC PT ASSISTANT EA 15: HCPCS | Mod: CQ

## 2025-08-12 ASSESSMENT — PAIN SCALES - PAIN ASSESSMENT IN ADVANCED DEMENTIA (PAINAD)
FACIALEXPRESSION: 0
FACIALEXPRESSION: 0 - SMILING OR INEXPRESSIVE.
TOTALSCORE: 0
CONSOLABILITY: 0
BREATHING: 0
BODYLANGUAGE: 0 - RELAXED.
NEGVOCALIZATION: 0 - NONE.
NEGVOCALIZATION: 0
BODYLANGUAGE: 0
CONSOLABILITY: 0 - NO NEED TO CONSOLE.

## 2025-08-12 ASSESSMENT — ENCOUNTER SYMPTOMS
PAIN LOCATION: LEFT KNEE
HIGHEST PAIN SEVERITY IN PAST 24 HOURS: 7/10
SUBJECTIVE PAIN PROGRESSION: GRADUALLY IMPROVING
PAIN: 1
PAIN LOCATION - PAIN SEVERITY: 4/10
PERSON REPORTING PAIN: PATIENT
PAIN SEVERITY GOAL: 0/10
LOWEST PAIN SEVERITY IN PAST 24 HOURS: 4/10

## 2025-08-15 ENCOUNTER — HOME CARE VISIT (OUTPATIENT)
Dept: HOME HEALTH SERVICES | Facility: HOME HEALTH | Age: 50
End: 2025-08-15
Payer: COMMERCIAL

## 2025-08-15 PROCEDURE — G0157 HHC PT ASSISTANT EA 15: HCPCS | Mod: CQ

## 2025-08-16 ASSESSMENT — PAIN SCALES - PAIN ASSESSMENT IN ADVANCED DEMENTIA (PAINAD)
BODYLANGUAGE: 0 - RELAXED.
BREATHING: 0
CONSOLABILITY: 0 - NO NEED TO CONSOLE.
CONSOLABILITY: 0
TOTALSCORE: 0
FACIALEXPRESSION: 0
NEGVOCALIZATION: 0 - NONE.
FACIALEXPRESSION: 0 - SMILING OR INEXPRESSIVE.
BODYLANGUAGE: 0
NEGVOCALIZATION: 0

## 2025-08-16 ASSESSMENT — ENCOUNTER SYMPTOMS
PAIN SEVERITY GOAL: 0/10
PERSON REPORTING PAIN: PATIENT
PAIN LOCATION - PAIN SEVERITY: 3/10
SUBJECTIVE PAIN PROGRESSION: GRADUALLY IMPROVING
PAIN: 1
PAIN LOCATION: LEFT KNEE

## 2025-08-17 ASSESSMENT — ENCOUNTER SYMPTOMS
LOWEST PAIN SEVERITY IN PAST 24 HOURS: 0/10
HIGHEST PAIN SEVERITY IN PAST 24 HOURS: 5/10

## 2025-08-19 ENCOUNTER — TELEPHONE (OUTPATIENT)
Dept: ORTHOPEDIC SURGERY | Facility: CLINIC | Age: 50
End: 2025-08-19
Payer: COMMERCIAL

## 2025-08-20 ENCOUNTER — HOME CARE VISIT (OUTPATIENT)
Dept: HOME HEALTH SERVICES | Facility: HOME HEALTH | Age: 50
End: 2025-08-20
Payer: COMMERCIAL

## 2025-08-22 ASSESSMENT — ACTIVITIES OF DAILY LIVING (ADL)
HOME_HEALTH_OASIS: 00
OASIS_M1830: 01

## 2025-08-23 LAB
ALBUMIN SERPL-MCNC: 4 G/DL (ref 3.6–5.1)
ALP SERPL-CCNC: 49 U/L (ref 31–125)
ALT SERPL-CCNC: 15 U/L (ref 6–29)
ANION GAP SERPL CALCULATED.4IONS-SCNC: 10 MMOL/L (CALC) (ref 7–17)
AST SERPL-CCNC: 23 U/L (ref 10–35)
BILIRUB SERPL-MCNC: 0.4 MG/DL (ref 0.2–1.2)
BUN SERPL-MCNC: 13 MG/DL (ref 7–25)
CALCIUM SERPL-MCNC: 9.5 MG/DL (ref 8.6–10.2)
CHLORIDE SERPL-SCNC: 104 MMOL/L (ref 98–110)
CO2 SERPL-SCNC: 27 MMOL/L (ref 20–32)
CREAT SERPL-MCNC: 0.97 MG/DL (ref 0.5–0.99)
EGFRCR SERPLBLD CKD-EPI 2021: 72 ML/MIN/1.73M2
GLUCOSE SERPL-MCNC: 92 MG/DL (ref 65–139)
POTASSIUM SERPL-SCNC: 4.4 MMOL/L (ref 3.5–5.3)
PROT SERPL-MCNC: 6.4 G/DL (ref 6.1–8.1)
SODIUM SERPL-SCNC: 141 MMOL/L (ref 135–146)

## 2025-08-25 ENCOUNTER — APPOINTMENT (OUTPATIENT)
Dept: RADIOLOGY | Facility: HOSPITAL | Age: 50
End: 2025-08-25
Payer: COMMERCIAL

## 2025-08-25 VITALS — WEIGHT: 110 LBS | HEIGHT: 64 IN | BODY MASS INDEX: 18.78 KG/M2

## 2025-08-25 DIAGNOSIS — Z12.31 ENCOUNTER FOR SCREENING MAMMOGRAM FOR MALIGNANT NEOPLASM OF BREAST: ICD-10-CM

## 2025-08-25 PROCEDURE — 77063 BREAST TOMOSYNTHESIS BI: CPT | Performed by: RADIOLOGY

## 2025-08-25 PROCEDURE — 77067 SCR MAMMO BI INCL CAD: CPT | Performed by: RADIOLOGY

## 2025-08-25 PROCEDURE — 77067 SCR MAMMO BI INCL CAD: CPT

## 2025-08-27 ENCOUNTER — TELEPHONE (OUTPATIENT)
Dept: ORTHOPEDIC SURGERY | Facility: CLINIC | Age: 50
End: 2025-08-27
Payer: COMMERCIAL

## 2025-08-28 ENCOUNTER — PREP FOR PROCEDURE (OUTPATIENT)
Dept: ORTHOPEDIC SURGERY | Facility: CLINIC | Age: 50
End: 2025-08-28
Payer: COMMERCIAL

## 2025-08-28 DIAGNOSIS — R22.32 FINGER MASS, LEFT: Primary | ICD-10-CM

## 2025-09-05 ENCOUNTER — APPOINTMENT (OUTPATIENT)
Dept: ORTHOPEDIC SURGERY | Facility: CLINIC | Age: 50
End: 2025-09-05
Payer: COMMERCIAL

## 2025-10-17 ENCOUNTER — APPOINTMENT (OUTPATIENT)
Dept: RHEUMATOLOGY | Facility: CLINIC | Age: 50
End: 2025-10-17
Payer: COMMERCIAL

## 2025-10-21 ENCOUNTER — APPOINTMENT (OUTPATIENT)
Dept: PHYSICAL MEDICINE AND REHAB | Facility: CLINIC | Age: 50
End: 2025-10-21
Payer: COMMERCIAL

## 2025-10-22 ENCOUNTER — APPOINTMENT (OUTPATIENT)
Dept: PHYSICAL MEDICINE AND REHAB | Facility: CLINIC | Age: 50
End: 2025-10-22
Payer: COMMERCIAL

## 2025-11-12 ENCOUNTER — APPOINTMENT (OUTPATIENT)
Dept: RHEUMATOLOGY | Facility: CLINIC | Age: 50
End: 2025-11-12
Payer: COMMERCIAL

## 2025-11-19 ENCOUNTER — APPOINTMENT (OUTPATIENT)
Dept: PRIMARY CARE | Facility: CLINIC | Age: 50
End: 2025-11-19
Payer: COMMERCIAL

## (undated) DEVICE — BLADE, OSCILLATOR 19.5 X 86 X 1.27MM

## (undated) DEVICE — GOWN, SURGICAL, SMARTGOWN, XX-LARGE, STERILE

## (undated) DEVICE — MAT, FLOOR, SUCTION, LOW PROFILE, 50 X 34

## (undated) DEVICE — TUBING, ARTHROSCOPIC INFLOW, 10K

## (undated) DEVICE — CUFF, TOURNIQUET, 24 X 4, SNGL PORT/SNGL BLADDER, DISP, LF

## (undated) DEVICE — COVER, MAYO STAND, W/PAD, 23 IN, DISPOSABLE, PLASTIC, LF, STERILE

## (undated) DEVICE — Device

## (undated) DEVICE — SUTURE, FIBERLOOP, P2, BLUE, STRAIGHT

## (undated) DEVICE — APPLICATOR, CHLORAPREP, W/ORANGE TINT, 26ML

## (undated) DEVICE — CAUTERY, PENCIL, PUSH BUTTON, SMOKE EVAC, 70MM

## (undated) DEVICE — DRAPE PACK, ORTHOPEDIC, ARTHROSCOPY, CUSTOM, GEAUGA

## (undated) DEVICE — KIT, MINOR, DOUBLE BASIN

## (undated) DEVICE — DRESSING, NON-ADHERENT, 3 X 3 IN, STERILE

## (undated) DEVICE — GUIDEWIRE, W/ TROCAR TIP, 1.6 X 235MM

## (undated) DEVICE — SUTURE, PROLENE, 3-0, 18 IN, PS2, BLUE

## (undated) DEVICE — SUTURE, VICRYL PLUS 3-0, SH, 27IN

## (undated) DEVICE — GLOVE, SURGICAL, PROTEXIS NEOPRENE, 8.0, PF, LF

## (undated) DEVICE — GLOVE, SURGICAL, PROTEXIS NEOPRENE, 8.5, PF, LF

## (undated) DEVICE — CUFF, TOURNIQUET, 34 X 4, SNGL PORT/SNGL BLADDER, DISP, LF

## (undated) DEVICE — DRESSING, MEPILEX BORDER, POST-OP AG, 4 X 10 IN

## (undated) DEVICE — ASSEMBLY, STRYKER FLOW 2, SUCTION IRRIGATOR, WITH TIP

## (undated) DEVICE — TOWEL PACK, STERILE, 4/PACK, BLUE

## (undated) DEVICE — SPONGE GAUZE, XRAY SC+RFID, 4X4 16 PLY, STERILE

## (undated) DEVICE — BRACE, T-SCOPE, PREMIER, UNIVERSAL

## (undated) DEVICE — COVER, TABLE, 44X90

## (undated) DEVICE — SOLUTION, IRRIGATION, USP, 0.9% SODIUM CHL, 3000ML, TITAN XL

## (undated) DEVICE — SUTURE, FIBERWIRE 2, T-5 TAPER NEEDLE, 38"

## (undated) DEVICE — DRESSING, MEPILEX BORDER, POST-OP AG, 4 X 12 IN

## (undated) DEVICE — COVER HANDLE LIGHT, STERIS, BLUE, STERILE

## (undated) DEVICE — LOOP, VESSEL, MAXI, RED

## (undated) DEVICE — SUTURE, PROLENE, 3-0, 18 IN, PS1, BLUE

## (undated) DEVICE — SUTURE, VICRYL PLUS, 0, 27IN, CT-2, UND, BRAIDED

## (undated) DEVICE — GOWN, SURGICAL, IMPLT, BACK, DISPOSABLE, XLARGE, STERILE

## (undated) DEVICE — SUTURE, CTD, VICRYL, 2-0, UND, BR, CT-2

## (undated) DEVICE — NEEDLE, SPINAL, QUINCKE, 18 G X 3.5 IN, PINK HUB

## (undated) DEVICE — SUTURE, STRATAFIX, 3-0 MONOCRYL PLUS, PS-2 SPIRAL UNDYED

## (undated) DEVICE — DRAPE, SHEET, FAN FOLDED, HALF, 44 X 58 IN, DISPOSABLE, LF, STERILE

## (undated) DEVICE — SYRINGE, 60 CC, IRRIGATION, BULB, CONTRO-BULB, PAPER POUCH